# Patient Record
Sex: FEMALE | Race: WHITE | NOT HISPANIC OR LATINO | Employment: UNEMPLOYED | URBAN - METROPOLITAN AREA
[De-identification: names, ages, dates, MRNs, and addresses within clinical notes are randomized per-mention and may not be internally consistent; named-entity substitution may affect disease eponyms.]

---

## 2017-01-04 ENCOUNTER — ROUTINE PRENATAL (OUTPATIENT)
Dept: OBSTETRICS AND GYNECOLOGY | Facility: CLINIC | Age: 22
End: 2017-01-04

## 2017-01-04 VITALS — SYSTOLIC BLOOD PRESSURE: 110 MMHG | WEIGHT: 185 LBS | DIASTOLIC BLOOD PRESSURE: 68 MMHG | BODY MASS INDEX: 29.86 KG/M2

## 2017-01-04 DIAGNOSIS — Z34.02 ENCOUNTER FOR SUPERVISION OF NORMAL FIRST PREGNANCY IN SECOND TRIMESTER: ICD-10-CM

## 2017-01-04 DIAGNOSIS — O99.013 ANEMIA DURING PREGNANCY IN THIRD TRIMESTER: ICD-10-CM

## 2017-01-04 PROCEDURE — 99213 OFFICE O/P EST LOW 20 MIN: CPT | Performed by: OBSTETRICS & GYNECOLOGY

## 2017-01-04 NOTE — PROGRESS NOTES
Chief Complaint   Patient presents with   • Routine Prenatal Visit       HPI: Geraldine is a  currently at 32w6d who today reports the following:  Contractions - No; Leaking - No; Vaginal bleeding -  No; Swelling of extremities - No.    ROS:  Vitals: See prenatal flowsheet   GI: Nausea - No; Constipation - No; Diarrhea - No    Neuro: Headache - No; Visual change - No      EXAM:  Abdomen: See prenatal flowsheet   Urine glucose/protein: See prenatal flowsheet   Pelvic: See prenatal flowsheet   MDM:  Impression: 1. Supervision of pregnancy  2. Anemia in pregnancy   Tests done today: none   Topics discussed: flu vaccine  TDAP vaccination   Tests next visit: none   Next visit: See prenatal flowsheet

## 2017-01-04 NOTE — MR AVS SNAPSHOT
Geraldine Enrique   2017 11:20 AM   Routine Prenatal    Dept Phone:  [phone]   Encounter #:  [id number]    Provider:  Louie Velazquez MD   Department:  [facility]                Your Full Care Plan              Your Updated Medication List          This list is accurate as of: 17 11:49 AM.  Always use your most recent med list.                ferrous sulfate 325 (65 FE) MG tablet   Take 1 tablet by mouth 2 (Two) Times a Day Before Meals.       prenatal (CLASSIC) vitamin 28-0.8 MG tablet tablet       saccharomyces boulardii 250 MG capsule   Commonly known as:  FLORASTOR               You Were Diagnosed With        Codes Comments    Anemia during pregnancy in third trimester     ICD-10-CM: O99.013  ICD-9-CM: 648.23     Encounter for supervision of normal first pregnancy in second trimester     ICD-10-CM: Z34.02  ICD-9-CM: V22.0       Instructions     None    Patient Instructions History      Upcoming Appointments     Visit Type Date Time Department    OB FOLLOWUP 2017 11:20 AM [facility]      MyChart Signup     [facility] AudioSnaps allows you to send messages to your doctor, view your test results, renew your prescriptions, schedule appointments, and more. To sign up, go to [employer] and click on the Sign Up Now link in the New User? box. Enter your Blaze health Activation Code exactly as it appears below along with the last four digits of your Social Security Number and your Date of Birth () to complete the sign-up process. If you do not sign up before the expiration date, you must request a new code.    Blaze health Activation Code: [email or web address]  Expires: 2017 11:49 AM    If you have questions, you can email [email or web address] or call [phone] to talk to our Blaze health staff. Remember, Blaze health is NOT to be used for urgent needs. For medical emergencies, dial 911.                  Other Info from Your Visit           Allergies     No Known Allergies      Reason for Visit     Routine Prenatal Visit           Vital Signs     Blood Pressure Weight Last Menstrual Period Body Mass Index Smoking Status       110/68 185 lb (83.9 kg) 05/19/2016 29.86 kg/m2 Never Smoker       Problems and Diagnoses Noted     Anemia during pregnancy in third trimester    Prenatal care

## 2017-01-10 ENCOUNTER — HOSPITAL ENCOUNTER (OUTPATIENT)
Facility: HOSPITAL | Age: 22
Setting detail: OBSERVATION
Discharge: HOME | End: 2017-01-10
Attending: OBSTETRICS & GYNECOLOGY | Admitting: OBSTETRICS & GYNECOLOGY

## 2017-01-10 ENCOUNTER — APPOINTMENT (OUTPATIENT)
Dept: ULTRASOUND IMAGING | Facility: HOSPITAL | Age: 22
End: 2017-01-10

## 2017-01-10 VITALS
TEMPERATURE: 97.7 F | SYSTOLIC BLOOD PRESSURE: 113 MMHG | HEIGHT: 66 IN | DIASTOLIC BLOOD PRESSURE: 67 MMHG | WEIGHT: 185 LBS | BODY MASS INDEX: 29.73 KG/M2 | RESPIRATION RATE: 18 BRPM | HEART RATE: 81 BPM

## 2017-01-10 PROBLEM — O26.839 KIDNEY STONE COMPLICATING PREGNANCY: Status: ACTIVE | Noted: 2017-01-10

## 2017-01-10 PROBLEM — N20.0 KIDNEY STONE COMPLICATING PREGNANCY: Status: ACTIVE | Noted: 2017-01-10

## 2017-01-10 PROBLEM — O99.891 KIDNEY STONE COMPLICATING PREGNANCY: Status: ACTIVE | Noted: 2017-01-10

## 2017-01-10 LAB
BACTERIA UR QL AUTO: ABNORMAL /HPF
BILIRUB UR QL STRIP: NEGATIVE
CLARITY UR: ABNORMAL
COLOR UR: YELLOW
DEPRECATED RDW RBC AUTO: 40 FL (ref 37–54)
ERYTHROCYTE [DISTWIDTH] IN BLOOD BY AUTOMATED COUNT: 13.2 % (ref 11.3–14.5)
GLUCOSE UR STRIP-MCNC: NEGATIVE MG/DL
HCT VFR BLD AUTO: 32.7 % (ref 34.5–44)
HGB BLD-MCNC: 11 G/DL (ref 11.5–15.5)
HGB UR QL STRIP.AUTO: NEGATIVE
HYALINE CASTS UR QL AUTO: ABNORMAL /LPF
KETONES UR QL STRIP: NEGATIVE
LEUKOCYTE ESTERASE UR QL STRIP.AUTO: ABNORMAL
MCH RBC QN AUTO: 27.9 PG (ref 27–31)
MCHC RBC AUTO-ENTMCNC: 33.6 G/DL (ref 32–36)
MCV RBC AUTO: 83 FL (ref 80–99)
NITRITE UR QL STRIP: NEGATIVE
PH UR STRIP.AUTO: 7 [PH] (ref 5–8)
PLATELET # BLD AUTO: 209 10*3/MM3 (ref 150–450)
PMV BLD AUTO: 10.2 FL (ref 6–12)
PROT UR QL STRIP: NEGATIVE
RBC # BLD AUTO: 3.94 10*6/MM3 (ref 3.89–5.14)
RBC # UR: ABNORMAL /HPF
REF LAB TEST METHOD: ABNORMAL
SP GR UR STRIP: 1.01 (ref 1–1.03)
SQUAMOUS #/AREA URNS HPF: ABNORMAL /HPF
UROBILINOGEN UR QL STRIP: ABNORMAL
WBC NRBC COR # BLD: 10.87 10*3/MM3 (ref 3.5–10.8)
WBC UR QL AUTO: ABNORMAL /HPF

## 2017-01-10 PROCEDURE — 25010000002 MORPHINE PER 10 MG: Performed by: OBSTETRICS & GYNECOLOGY

## 2017-01-10 PROCEDURE — G0378 HOSPITAL OBSERVATION PER HR: HCPCS

## 2017-01-10 PROCEDURE — 59025 FETAL NON-STRESS TEST: CPT

## 2017-01-10 PROCEDURE — 82360 CALCULUS ASSAY QUANT: CPT | Performed by: OBSTETRICS & GYNECOLOGY

## 2017-01-10 PROCEDURE — 96361 HYDRATE IV INFUSION ADD-ON: CPT

## 2017-01-10 PROCEDURE — 76775 US EXAM ABDO BACK WALL LIM: CPT

## 2017-01-10 PROCEDURE — 99218 PR INITIAL OBSERVATION CARE/DAY 30 MINUTES: CPT | Performed by: OBSTETRICS & GYNECOLOGY

## 2017-01-10 PROCEDURE — 25010000002 DIPHENHYDRAMINE PER 50 MG: Performed by: OBSTETRICS & GYNECOLOGY

## 2017-01-10 PROCEDURE — 87086 URINE CULTURE/COLONY COUNT: CPT | Performed by: OBSTETRICS & GYNECOLOGY

## 2017-01-10 PROCEDURE — 96374 THER/PROPH/DIAG INJ IV PUSH: CPT

## 2017-01-10 PROCEDURE — 85027 COMPLETE CBC AUTOMATED: CPT | Performed by: OBSTETRICS & GYNECOLOGY

## 2017-01-10 PROCEDURE — 96375 TX/PRO/DX INJ NEW DRUG ADDON: CPT

## 2017-01-10 PROCEDURE — 99234 HOSP IP/OBS SM DT SF/LOW 45: CPT | Performed by: OBSTETRICS & GYNECOLOGY

## 2017-01-10 PROCEDURE — 81001 URINALYSIS AUTO W/SCOPE: CPT | Performed by: OBSTETRICS & GYNECOLOGY

## 2017-01-10 RX ORDER — DIPHENHYDRAMINE HYDROCHLORIDE 50 MG/ML
25 INJECTION INTRAMUSCULAR; INTRAVENOUS EVERY 6 HOURS PRN
Status: DISCONTINUED | OUTPATIENT
Start: 2017-01-10 | End: 2017-01-10 | Stop reason: SDUPTHER

## 2017-01-10 RX ORDER — SODIUM CHLORIDE 0.9 % (FLUSH) 0.9 %
1-10 SYRINGE (ML) INJECTION AS NEEDED
Status: DISCONTINUED | OUTPATIENT
Start: 2017-01-10 | End: 2017-01-10 | Stop reason: HOSPADM

## 2017-01-10 RX ORDER — DIPHENHYDRAMINE HYDROCHLORIDE 50 MG/ML
25 INJECTION INTRAMUSCULAR; INTRAVENOUS EVERY 4 HOURS PRN
Status: DISCONTINUED | OUTPATIENT
Start: 2017-01-10 | End: 2017-01-10 | Stop reason: HOSPADM

## 2017-01-10 RX ORDER — SODIUM CHLORIDE, SODIUM LACTATE, POTASSIUM CHLORIDE, CALCIUM CHLORIDE 600; 310; 30; 20 MG/100ML; MG/100ML; MG/100ML; MG/100ML
125 INJECTION, SOLUTION INTRAVENOUS CONTINUOUS
Status: DISCONTINUED | OUTPATIENT
Start: 2017-01-10 | End: 2017-01-10 | Stop reason: HOSPADM

## 2017-01-10 RX ORDER — OXYCODONE HYDROCHLORIDE AND ACETAMINOPHEN 5; 325 MG/1; MG/1
2 TABLET ORAL EVERY 4 HOURS PRN
Status: DISCONTINUED | OUTPATIENT
Start: 2017-01-10 | End: 2017-01-10 | Stop reason: HOSPADM

## 2017-01-10 RX ORDER — ONDANSETRON 2 MG/ML
4 INJECTION INTRAMUSCULAR; INTRAVENOUS EVERY 8 HOURS PRN
Status: DISCONTINUED | OUTPATIENT
Start: 2017-01-10 | End: 2017-01-10 | Stop reason: HOSPADM

## 2017-01-10 RX ORDER — LIDOCAINE HYDROCHLORIDE 10 MG/ML
5 INJECTION, SOLUTION INFILTRATION; PERINEURAL AS NEEDED
Status: DISCONTINUED | OUTPATIENT
Start: 2017-01-10 | End: 2017-01-10 | Stop reason: HOSPADM

## 2017-01-10 RX ORDER — MORPHINE SULFATE 4 MG/ML
2 INJECTION, SOLUTION INTRAMUSCULAR; INTRAVENOUS EVERY 4 HOURS PRN
Status: DISCONTINUED | OUTPATIENT
Start: 2017-01-10 | End: 2017-01-10

## 2017-01-10 RX ADMIN — SODIUM CHLORIDE, POTASSIUM CHLORIDE, SODIUM LACTATE AND CALCIUM CHLORIDE 1000 ML: 600; 310; 30; 20 INJECTION, SOLUTION INTRAVENOUS at 03:39

## 2017-01-10 RX ADMIN — SODIUM CHLORIDE, POTASSIUM CHLORIDE, SODIUM LACTATE AND CALCIUM CHLORIDE 125 ML/HR: 600; 310; 30; 20 INJECTION, SOLUTION INTRAVENOUS at 04:45

## 2017-01-10 RX ADMIN — DIPHENHYDRAMINE HYDROCHLORIDE 25 MG: 50 INJECTION INTRAMUSCULAR; INTRAVENOUS at 03:46

## 2017-01-10 RX ADMIN — MORPHINE SULFATE 2 MG: 4 INJECTION, SOLUTION INTRAMUSCULAR; INTRAVENOUS at 03:40

## 2017-01-10 RX ADMIN — OXYCODONE AND ACETAMINOPHEN 1 TABLET: 5; 325 TABLET ORAL at 04:51

## 2017-01-10 NOTE — H&P
Michel  Obstetric History and Physical    No chief complaint on file.      Subjective     Patient is a 21 y.o. female  currently at 33w5d, who presents with pressure, pelvic pain and urgency without the ability to urinate.  She denies leaking fluid and vaginal bleeding.  +FM.   Upon arrival she past a large renal stone.  She continues to report 4/10 pain in her back on the right side.  She denies N/V/F/C.  Patient received 2 mg of Morphine and had a nearly immediate flush reaction and itching.  This was treated with Benadryl and resolved.   Although she says she likely had Morphine from a previous surgery, she was not aware of any reactions in the past.   It is added as a allergey in Epic      The following portions of the patients history were reviewed and updated as appropriate: current medications, allergies, past medical history, past surgical history, past family history, past social history and problem list .       Prenatal Information:   Maternal Prenatal Labs  Blood Type No results found for: ABO   Rh Status No results found for: RH   Antibody Screen No results found for: ABSCRN   Gonnorhea No results found for: GCCX   Chlamydia No results found for: CLAMYDCU   RPR No results found for: RPR   Syphilis Antibody No results found for: SYPHILIS   Rubella No results found for: RUBELLAIGGIN   Hepatitis B Surface Antigen No results found for: HEPBSAG   HIV-1 Antibody No results found for: LABHIV1   Hepatitis C Antibody No results found for: HEPCAB   Rapid Urin Drug Screen No results found for: AMPMETHU, BARBITSCNUR, LABBENZSCN, LABMETHSCN, LABOPIASCN, THCURSCR, COCAINEUR, AMPHETSCREEN, PROPOXSCN, BUPRENORSCNU, METAMPSCNUR, OXYCODONESCN, TRICYCLICSCN   Group B Strep Culture No results found for: GBSANTIGEN           External Prenatal Results         Pregnancy Outside Results - these were transcribed from office records.  See scanned records for details. Date Time   Hgb      Hct      ABO ^ A  16    Rh  ^ Positive  16    Antibody Screen ^ Normal  (A) 16    Glucose Fasting GTT      Glucose Tolerance Test 1 hour      Glucose Tolerance Test 3 hour      Gonorrhea (discrete)      Chlamydia (discrete)      RPR ^ Non-Reactive  16    VDRL      Syphillis Antibody      Rubella ^ Immune  16    HBsAg ^ Negative  16    Herpes Simplex Virus PCR      Herpes Simplex VIrus Culture      HIV ^ Negative  16    Hep C RNA Quant PCR      Hep C Antibody      Urine Drug Screen      AFP      Group B Strep      GBS Susceptibility to Clindamycin      GBS Susceptibility to Eythromycin      Fetal Fibronectin      Genetic Testing, Maternal Blood             Legend: ^: Historical            Past OB History:     Obstetric History       T0      TAB0   SAB0   E0   M0   L0       # Outcome Date GA Lbr Britton/2nd Weight Sex Delivery Anes PTL Lv   1 Current                   Past Medical History: No past medical history on file.   Past Surgical History Past Surgical History   Procedure Laterality Date   • Cervical chiari decompression posterior     • Spinal growth rods  10/2007     scoliosis      Family History: No family history on file.   Social History:  reports that she has never smoked. She does not have any smokeless tobacco history on file.   reports that she does not drink alcohol.   has no drug history on file.        Review of Systems  Reviewed with nurse in EPIC      Objective     Vital Signs Range for the last 24 hours  Temperature: Temp:  [98.4 °F (36.9 °C)] 98.4 °F (36.9 °C)   Temp Source:     BP: BP: (127)/(72) 127/72   Pulse: Heart Rate:  [75] 75   Respirations: Resp:  [18] 18   SPO2:     O2 Amount (l/min):     O2 Devices     Weight:       Physical Examination: General appearance - Mild distress with back and pelvic pressure  Chest - clear to auscultation, no wheezes, rales or rhonchi, symmetric air entry  Heart - S1 and S2 normal, no murmurs noted  Abdomen - soft, nontender,  nondistended, no masses or organomegaly  no rebound tenderness noted  bowel sounds normal  No guarding, No RUQ pain  Back:  Right sided CVA tenderness.     Extremities - Non-tender bilaterally                          Fetal Heart Rate Assessment   Method:     Beats/min:  130s   Baseline:  130s   Varibility:     Accels:     Decels:     Tracing Category:       Uterine Assessment   Method:     Frequency (min):     Ctx Count in 10 min:     Duration:     Intensity:     Intensity by IUPC:     Resting Tone:     Resting Tone by IUPC:           Laboratory Results: UA negative for infection          Assessment:  1. Intrauterine pregnancy at 33w5d weeks gestation with reassuring fetal status  2.  Renal calculi  3.  Newly diagnosed morphine allergy      Plan:  1. FHTs - reassuring   2. UA, CBC, IV fluids  3.  Pain medications prn  4.  Renal USN          Omari Pedroza MD  1/10/2017  4:35 AM

## 2017-01-10 NOTE — NURSING NOTE
1255-Discharge instructions given to pt. & reviewed. Pt. to keep scheduled appointment on 1/18/17. Pt. V/U. Up to get dressed. Pt. Waiting for her ride to get here. Instructed to notify RN when she is leaving.  Pt. V/U.  1308-Pt.'s ride is here. Declines wheelchair for D/C. D/C'd ambulatory with friend.

## 2017-01-10 NOTE — DISCHARGE SUMMARY
Michel  Geraldine Enrique  : 1995  MRN: [id number]  CSN: [id number]    Discharge Summary    A formal discharge summary was not needed because this admission was for an observation visit    This note has been electronically signed.    Louie Velazquez MD  January 10, 2017

## 2017-01-10 NOTE — NURSING NOTE
IV morphine 2 mg given for pain per MD. Pt stated no previous allergies.  Within 30-60 seconds, pt reported itching up arm with increasing redness up to her chest. Benadryl 25mg IV given. Itching resolved.

## 2017-01-10 NOTE — IP AVS SNAPSHOT
AFTER VISIT SUMMARY             Geraldine Enrique           About your hospitalization     You were discharged on:  January 10, 2017 You last received care in the:  [facility]     Unit phone number:  [phone]       Medications    If you or your caregiver advised us that you are currently taking a medication and that medication is marked below as “Resume”, this simply indicates that we have reviewed those medications to make sure our new therapy recommendations do not interfere.  If you have concerns about medications other than those new ones which we are prescribing today, please consult the physician who prescribed them (or your primary physician).  Our review of your home medications is not meant to indicate that we are directing their use.             Your Medications      Your Medication List           Morning Noon Evening Bedtime As Needed    ferrous sulfate 325 (65 FE) MG tablet   Take 1 tablet by mouth 2 (Two) Times a Day Before Meals.                                prenatal (CLASSIC) vitamin 28-0.8 MG tablet tablet   Take  by mouth Daily.                                saccharomyces boulardii 250 MG capsule   Take 250 mg by mouth 2 (Two) Times a Day.   Commonly known as:  FLORASTOR                                         Instructions for After Discharge        Scheduled Appointments     Jan 18, 2017 10:30 AM EST   OB FOLLOWUP with Louie Velazquez MD   [facility] [place] (--)    [address] [place] [id number] [phone]            Additional instructions:      Keep scheduled appointment with Dr. Velazquez on 1/18/17.               Relevant Prenatal Information          Facts About Your Prenatal Visit (All Dating Information Is Approximate)     Due Date How Far Along Am I? Pregnancy Weight Gain Weight Gain Since Prior Visit (1/4/2017)    2/23/2017 33 weeks 5 days 29 lb (13.2 kg) 0 lb (0 kg)      Vitals        Blood Pressure Weight                113/67 185 lb (83.9 kg)           Summary of Your Hospitalization        Reason for Hospitalization     Your primary diagnosis was:  Kidney Stone Complicating Pregnancy      Care Providers     Provider Service Role Specialty    Louie Velazquez MD -- Attending Provider Obstetrics and Gynecology      Your Allergies  Date Reviewed: 1/10/2017    Allergen Reactions    Morphine And Related Hives      DNS:Net Signup     [employer] allows you to send messages to your doctor, view your test results, renew your prescriptions, schedule appointments, and more. To sign up, go to [email or web address] and click on the Sign Up Now link in the New User? box. Enter your DNS:Net Activation Code exactly as it appears below along with the last four digits of your Social Security Number and your Date of Birth () to complete the sign-up process. If you do not sign up before the expiration date, you must request a new code.    DNS:Net Activation Code: [email or web address]  Expires: 2017 11:49 AM    If you have questions, you can email [email or web address] or call [phone] to talk to our DNS:Net staff. Remember, DNS:Net is NOT to be used for urgent needs. For medical emergencies, dial 911.          Information Regarding Fetal Kick Counts     Fetal Movement Counts  Patient Name: __________________________________________________   Patient Due Date: ____________________  Performing a fetal movement count is highly recommended in high-risk pregnancies, but it is good for every pregnant woman to do. Your caregiver may ask you to start counting fetal movements at 28 weeks of the pregnancy. Fetal movements often increase:  · After eating a full meal.  · After physical activity.  · After eating or drinking something sweet or cold.  · At rest.  Pay attention to when you feel the baby is most active. This will help you notice a pattern of your baby's sleep and wake cycles  and what factors contribute to an increase in fetal movement. It is important to perform a fetal movement count at the same time each day when your baby is normally most active.    HOW TO COUNT FETAL MOVEMENTS  1. Find a quiet and comfortable area to sit or lie down on your left side. Lying on your left side provides the best blood and oxygen circulation to your baby.  2. Write down the day and time on a sheet of paper or in a journal.  3. Start counting kicks, flutters, swishes, rolls, or jabs in a 2 hour period. You should feel at least 10 movements within 2 hours.  4. If you do not feel 10 movements in 2 hours, wait 2-3 hours and count again. Look for a change in the pattern or not enough counts in 2 hours.  SEEK MEDICAL CARE IF:  · You feel less than 10 counts in 2 hours, tried twice.  · There is no movement in over an hour.  · The pattern is changing or taking longer each day to reach 10 counts in 2 hours.  · You feel the baby is not moving as he or she usually does.    Date  Movements  Start Time  Finish Time    Date  Movements  Start Time  Finish Time    Date  Movements  Start Time  Finish Time    Date  Movements  Start Time  Finish Time    Date  Movements  Start Time  Finish Time    Date  Movements  Start Time  Finish Time      Date  Movements  Start Time  Finish Time    Date  Movements  Start Time  Finish Time    Date  Movements  Start Time  Finish Time    Date  Movements  Start Time  Finish Time    Date  Movements  Start Time  Finish Time    Date  Movements  Start Time  Finish Time      Date  Movements  Start Time  Finish Time    Date  Movements  Start Time  Finish Time    Date  Movements  Start Time  Finish Time    Date  Movements  Start Time  Finish Time    Date  Movements  Start Time  Finish Time    Date  Movements  Start Time  Finish Time    Date  Movements  Start Time  Finish Time    Date  Movements  Start Time  Finish Time    Date  Movements  Start Time  Finish Time    Date  Movements  Start Time   Finish Time    Date  Movements  Start Time  Finish Time    Date  Movements  Start Time  Finish Time      Document Released: 01/17/2008   Document Revised: 12/04/2013   Document Reviewed: 10/14/2013    ExitCare® Patient Information ©2015 [employer]. This information is not intended to replace advice given to you by your health care provider. Make sure you discuss any questions you have with your health care provider.            More Information      Kidney Stones  Kidney stones (urolithiasis) are solid masses that form inside your kidneys. The intense pain is caused by the stone moving through the kidney, ureter, bladder, and urethra (urinary tract). When the stone moves, the ureter starts to spasm around the stone. The stone is usually passed in your pee (urine).   HOME CARE  · Drink enough fluids to keep your pee clear or pale yellow. This helps to get the stone out.  · Take a 24-hour pee (urine) sample as told by your doctor. You may need to take another sample every 6-12 months.  · Strain all pee through the provided strainer. Do not pee without peeing through the strainer, not even once. If you pee the stone out, catch it in the strainer. The stone may be as small as a grain of salt. Take this to your doctor. This will help your doctor figure out what you can do to try to prevent more kidney stones.  · Only take medicine as told by your doctor.  · Make changes to your daily diet as told by your doctor. You may be told to:    Limit how much salt you eat.    Eat 5 or more servings of fruits and vegetables each day.    Limit how much meat, poultry, fish, and eggs you eat.  · Keep all follow-up visits as told by your doctor. This is important.  · Get follow-up X-rays as told by your doctor.  GET HELP IF:  You have pain that gets worse even if you have been taking pain medicine.  GET HELP RIGHT AWAY IF:   · Your pain does not get better with medicine.  · You have a fever or shaking chills.  · Your pain increases  and gets worse over 18 hours.  · You have new belly (abdominal) pain.  · You feel faint or pass out.  · You are unable to pee.     This information is not intended to replace advice given to you by your health care provider. Make sure you discuss any questions you have with your health care provider.     Document Released: 06/05/2009 Document Revised: 09/07/2016 Document Reviewed: 05/21/2014  [employer] Interactive Patient Education ©2016 [employer]          Nonstress Test  The nonstress test is a procedure that monitors the fetus's heartbeat. The test will monitor the heartbeat when the fetus is at rest and while the fetus is moving. In a healthy fetus, there will be an increase in fetal heart rate when the fetus moves or kicks. The heart rate will decrease at rest. This test helps determine if the fetus is healthy. Your health care provider will look at a number of patterns in the heart rate tracing to make sure your baby is thriving. If there is concern, your health care provider may order additional tests or may suggest another course of action. This test is often done in the third trimester and can help determine if an early delivery is needed and safe. Common reasons to have this test are:  · You are past your due date.  · You have a high-risk pregnancy.  · You are feeling less movement than normal.  · You have lost a pregnancy in the past.  · Your health care provider suspects fetal growth problems.  · You have too much or too little amniotic fluid.  BEFORE THE PROCEDURE  · Eat a meal right before the test or as directed by your health care provider. Food may help stimulate fetal movements.  · Use the restroom right before the test.  PROCEDURE  · Two belts will be placed around your abdomen. These belts have monitors attached to them. One records the fetal heart rate and the other records uterine contractions.  · You may be asked to lie down on your side or to stay sitting upright.  · You may be given a  button to press when you feel movement.  · The fetal heartbeat is listened to and watched on a screen. The heartbeat is recorded on a sheet of paper.  · If the fetus seems to be sleeping, you may be asked to drink some juice or soda, gently press your abdomen, or make some noise to wake the fetus.  AFTER THE PROCEDURE   Your health care provider will discuss the test results with you and make recommendations for the near future.     This information is not intended to replace advice given to you by your health care provider. Make sure you discuss any questions you have with your health care provider.     Document Released: 12/08/2003 Document Revised: 01/08/2016 Document Reviewed: 01/21/2014  [employer] Interactive Patient Education ©2016 [employer]            SYMPTOMS OF A STROKE    Call 911 or have someone take you to the Emergency Department if you have any of the following:    · Sudden numbness or weakness of your face, arm or leg especially on one side of the body  · Sudden confusion, diffiiculty speaking or trouble understanding   · Changes in your vision or loss of sight in one eye  · Sudden severe headache with no known cause  · sudden dizziness, trouble walking, loss of balance or coordination    It is important to seek emergency care right away if you have further stroke symptoms. If you get emergency help quickly, the powerful clot-dissolving medicines can reduce the disabilities caused by a stroke.     For more information:    American Stroke Association  9-739-4-STROKE  www.strokeassociation.org           IF YOU SMOKE OR USE TOBACCO PLEASE READ THE FOLLOWING:    Why is smoking bad for me?  Smoking increases the risk of heart disease, lung disease, vascular disease, stroke, and cancer.     If you smoke, STOP!    If you would like more information on quitting smoking, please visit the [employer] website: [email or web address]   This link will provide  additional resources including the QUIT line and the Beat the Pack support groups.     For more information:    American Cancer Society  ([phone] 198-2958    American Heart Association  1-964.545.3473               YOU ARE THE MOST IMPORTANT FACTOR IN YOUR RECOVERY.     Follow all instructions carefully.     I have reviewed my discharge instructions with my nurse, including the following information, if applicable:     Information about my illness and diagnosis   Follow up appointments (including lab draws)   Wound Care   Equipment Needs   Medications (new and continuing) along with side effects   Preventative information such as vaccines and smoking cessations   Diet   Pain   I know when to contact my Doctor's office or seek emergency care      I want my nurse to describe the side effects of my medications: YES NO   If the answer is no, I understand the side effects of my medications: YES NO   My nurse described the side effects of my medications in a way that I could understand: YES NO   I have taken my personal belongings and my own medications with me at discharge: YES NO            Should a home visit be schedule with you:  a notification from your provider will be made prior to the visit.  If you have any questions or concerns about the visit, contact your provider.      I have received this information and my questions have been answered. I have discussed any concerns I see with this plan with the nurse or physician. I understand these instructions.    Signature of Patient or Responsible Person: _____________________________________    Date: _________________  Time: __________________    Signature of Healthcare Provider: _______________________________________  Date: _________________  Time: __________________

## 2017-01-10 NOTE — PROGRESS NOTES
JOSUE Pearson  Geraldine Enrique  : 1995  MRN: [id number]  CSN: [id number]    Antepartum Progress Note    Subjective   Feeling better after passing stone.     Objective     Min/max vitals past 24 hours:   Temp  Min: 98.4 °F (36.9 °C)  Max: 98.4 °F (36.9 °C)  BP  Min: 109/58  Max: 127/72  Pulse  Min: 74  Max: 75  Resp  Min: 18  Max: 18         General: well developed; well nourished  no acute distress   Heart: Not performed.   Lungs: breathing is unlabored   Abdomen: soft, non-tender; no masses  no umbilical or inginual hernias are present  no hepato-splenomegaly   FHT's: reassuring and category 1   Cervix: was not checked.   Contractions: none      Renal U/S - no residual stones.  Normal physiologic hydronephrosis of right kidney.  Left side normal.     Assessment   1. IUP at 33w5d  2. Renal stones with colic - resolved     Plan   1. Home  2. Hydration    Louie Velazquez MD  1/10/2017  10:49 AM

## 2017-01-12 LAB — BACTERIA SPEC AEROBE CULT: NORMAL

## 2017-01-17 LAB
CA PHOS CRY STONE QL IR: 96 %
COD CRY STONE QL IR: 2 %
COLOR STONE: NORMAL
COM CRY STONE QL IR: 2 %
COMPN STONE: NORMAL
Lab: NORMAL
Lab: NORMAL
NIDUS STONE QL: NORMAL
PATH REPORT.COMMENTS IMP SPEC: NORMAL
SIZE STONE: NORMAL MM
WT STONE: 25 MG

## 2017-01-18 ENCOUNTER — ROUTINE PRENATAL (OUTPATIENT)
Dept: OBSTETRICS AND GYNECOLOGY | Facility: CLINIC | Age: 22
End: 2017-01-18

## 2017-01-18 VITALS — WEIGHT: 189 LBS | SYSTOLIC BLOOD PRESSURE: 110 MMHG | BODY MASS INDEX: 30.51 KG/M2 | DIASTOLIC BLOOD PRESSURE: 70 MMHG

## 2017-01-18 DIAGNOSIS — R12 HEARTBURN DURING PREGNANCY IN THIRD TRIMESTER: ICD-10-CM

## 2017-01-18 DIAGNOSIS — O26.893 HEARTBURN DURING PREGNANCY IN THIRD TRIMESTER: ICD-10-CM

## 2017-01-18 DIAGNOSIS — Z34.02 ENCOUNTER FOR SUPERVISION OF NORMAL FIRST PREGNANCY IN SECOND TRIMESTER: Primary | ICD-10-CM

## 2017-01-18 PROCEDURE — 99213 OFFICE O/P EST LOW 20 MIN: CPT | Performed by: OBSTETRICS & GYNECOLOGY

## 2017-01-18 NOTE — MR AVS SNAPSHOT
Geraldine Enrique   2017 10:30 AM   Routine Prenatal    Dept Phone:  [phone]   Encounter #:  [id number]    Provider:  Louie Velazquez MD   Department:  [facility] [place]                Your Full Care Plan              Your Updated Medication List          This list is accurate as of: 17 11:43 AM.  Always use your most recent med list.                ferrous sulfate 325 (65 FE) MG tablet   Take 1 tablet by mouth 2 (Two) Times a Day Before Meals.       prenatal (CLASSIC) vitamin 28-0.8 MG tablet tablet       saccharomyces boulardii 250 MG capsule   Commonly known as:  FLORASTOR               You Were Diagnosed With        Codes Comments    Encounter for supervision of normal first pregnancy in second trimester    -  Primary ICD-10-CM: Z34.02  ICD-9-CM: V22.0     Heartburn during pregnancy in third trimester     ICD-10-CM: O26.893, R12  ICD-9-CM: 646.83, 787.1       Instructions     None    Patient Instructions History      Upcoming Appointments     Visit Type Date Time Department    OB FOLLOWUP 2017 10:30 AM [facility]    OB FOLLOWUP 2017  8:10 AM [facility]      MyChart Signup     [facility] [employer] allows you to send messages to your doctor, view your test results, renew your prescriptions, schedule appointments, and more. To sign up, go to [employer] and click on the Sign Up Now link in the New User? box. Enter your [employer] Activation Code exactly as it appears below along with the last four digits of your Social Security Number and your Date of Birth () to complete the sign-up process. If you do not sign up before the expiration date, you must request a new code.    [employer] Activation Code: [email or web address]  Expires: 2017 11:49 AM    If you have questions, you can email [email or web address] or call [phone] to talk to our [employer] staff. Remember, [employer] is  NOT to be used for urgent needs. For medical emergencies, dial 911.               Other Info from Your Visit           Your Appointments     Jan 25, 2017  8:10 AM EST   OB FOLLOWUP with Louie Velazquez MD   [facility] [place] (--)    [address] [place] [address] [address]  [facility] [address]   [phone]              Allergies     Morphine And Related Allergy Hives      Reason for Visit     Routine Prenatal Visit heartburn      Vital Signs     Blood Pressure Weight Last Menstrual Period Body Mass Index Smoking Status       110/70 189 lb (85.7 kg) 05/19/2016 30.51 kg/m2 Never Smoker       Problems and Diagnoses Noted     Prenatal care    Heartburn during pregnancy in third trimester

## 2017-01-18 NOTE — PROGRESS NOTES
Chief Complaint   Patient presents with   • Routine Prenatal Visit     heartburn       HPI: Geraldine is a  currently at 34w6d who today reports the following:  Contractions - No; Leaking - No; Vaginal bleeding -  No; Swelling of extremities - No.    ROS:  GI: Nausea - YES; Constipation - No; Diarrhea - No    Neuro: Headache - No; Visual change - No      EXAM:  Vitals: See prenatal flowsheet   Abdomen: See prenatal flowsheet   Urine glucose/protein: See prenatal flowsheet   Pelvic: See prenatal flowsheet   MDM:  Impression: 1. Supervision of pregnancy  2. GERD in pregnancy   Tests done today: none   Topics discussed: 1. Continue with PNV's  2. Prenatal labs reviewed  3. flu vaccine  4. TDAP vaccination   5. OTC Rx for GERD   Tests next visit: GBS testing   Next visit: See prenatal flowsheet

## 2017-01-25 ENCOUNTER — ROUTINE PRENATAL (OUTPATIENT)
Dept: OBSTETRICS AND GYNECOLOGY | Facility: CLINIC | Age: 22
End: 2017-01-25

## 2017-01-25 VITALS — WEIGHT: 192 LBS | SYSTOLIC BLOOD PRESSURE: 112 MMHG | DIASTOLIC BLOOD PRESSURE: 72 MMHG | BODY MASS INDEX: 30.99 KG/M2

## 2017-01-25 DIAGNOSIS — R12 HEARTBURN DURING PREGNANCY IN THIRD TRIMESTER: ICD-10-CM

## 2017-01-25 DIAGNOSIS — Z34.02 ENCOUNTER FOR SUPERVISION OF NORMAL FIRST PREGNANCY IN SECOND TRIMESTER: ICD-10-CM

## 2017-01-25 DIAGNOSIS — O99.013 ANEMIA DURING PREGNANCY IN THIRD TRIMESTER: ICD-10-CM

## 2017-01-25 DIAGNOSIS — O26.893 HEARTBURN DURING PREGNANCY IN THIRD TRIMESTER: ICD-10-CM

## 2017-01-25 PROCEDURE — 99213 OFFICE O/P EST LOW 20 MIN: CPT | Performed by: OBSTETRICS & GYNECOLOGY

## 2017-01-25 NOTE — MR AVS SNAPSHOT
Geraldine Enrique   2017 8:10 AM   Routine Prenatal    Dept Phone:  [phone]   Encounter #:  [id number]    Provider:  Louie Velazquez MD   Department:  [facility] [place]                Your Full Care Plan              Your Updated Medication List          This list is accurate as of: 17  8:30 AM.  Always use your most recent med list.                ferrous sulfate 325 (65 FE) MG tablet   Take 1 tablet by mouth 2 (Two) Times a Day Before Meals.       prenatal (CLASSIC) vitamin 28-0.8 MG tablet tablet       saccharomyces boulardii 250 MG capsule   Commonly known as:  FLORASTOR               You Were Diagnosed With        Codes Comments    Heartburn during pregnancy in third trimester     ICD-10-CM: O26.893, R12  ICD-9-CM: 646.83, 787.1     Anemia during pregnancy in third trimester     ICD-10-CM: O99.013  ICD-9-CM: 648.23     Encounter for supervision of normal first pregnancy in second trimester     ICD-10-CM: Z34.02  ICD-9-CM: V22.0       Instructions     None    Patient Instructions History      Upcoming Appointments     Visit Type Date Time Department    OB FOLLOWUP 2017  8:10 AM [facility]    OB FOLLOWUP 2017 10:20 AM [facility]      MyChart Signup     [facility] [employer] allows you to send messages to your doctor, view your test results, renew your prescriptions, schedule appointments, and more. To sign up, go to [employer] and click on the Sign Up Now link in the New User? box. Enter your [employer] Activation Code exactly as it appears below along with the last four digits of your Social Security Number and your Date of Birth () to complete the sign-up process. If you do not sign up before the expiration date, you must request a new code.    [employer] Activation Code: [email or web address]  Expires: 2017  8:30 AM    If you have questions, you can email  [email or web address] or call [phone] to talk to our MyChart staff. Remember, [employer] is NOT to be used for urgent needs. For medical emergencies, dial 911.               Other Info from Your Visit           Your Appointments     Feb 01, 2017 10:20 AM EST   OB FOLLOWUP with Louie Velazquez MD   [facility] [place] (--)    [address] [place] [id number] [phone]              Allergies     Morphine And Related Allergy Hives      Reason for Visit     Routine Prenatal Visit           Vital Signs     Blood Pressure Weight Last Menstrual Period Body Mass Index Smoking Status       112/72 192 lb (87.1 kg) 05/19/2016 30.99 kg/m2 Never Smoker       Problems and Diagnoses Noted     Anemia during pregnancy in third trimester    Prenatal care    Heartburn during pregnancy in third trimester

## 2017-01-25 NOTE — PROGRESS NOTES
Chief Complaint   Patient presents with   • Routine Prenatal Visit       HPI: Geraldine is a  currently at 35w6d who today reports the following:  Contractions - No; Leaking - No; Vaginal bleeding -  No; Swelling of extremities - YES.    ROS:  GI: Nausea - No; Constipation - No; Diarrhea - No    Neuro: Headache - No; Visual change - No      EXAM:  Vitals: See prenatal flowsheet   Abdomen: See prenatal flowsheet   Urine glucose/protein: See prenatal flowsheet   Pelvic: See prenatal flowsheet   MDM:  Impression: 1. Supervision of pregnancy  2. Anemia in pregnancy   Tests done today: GBS testing   Topics discussed: 1. Continue with PNV's  2. Prenatal labs reviewed  3. labor signs and symptoms   Tests next visit: none   Next visit: See prenatal flowsheet

## 2017-02-01 ENCOUNTER — ROUTINE PRENATAL (OUTPATIENT)
Dept: OBSTETRICS AND GYNECOLOGY | Facility: CLINIC | Age: 22
End: 2017-02-01

## 2017-02-01 VITALS — WEIGHT: 195 LBS | SYSTOLIC BLOOD PRESSURE: 116 MMHG | DIASTOLIC BLOOD PRESSURE: 72 MMHG | BODY MASS INDEX: 31.47 KG/M2

## 2017-02-01 DIAGNOSIS — O26.03 EXCESSIVE WEIGHT GAIN DURING PREGNANCY IN THIRD TRIMESTER: ICD-10-CM

## 2017-02-01 DIAGNOSIS — Z34.02 ENCOUNTER FOR SUPERVISION OF NORMAL FIRST PREGNANCY IN SECOND TRIMESTER: ICD-10-CM

## 2017-02-01 DIAGNOSIS — O99.013 ANEMIA DURING PREGNANCY IN THIRD TRIMESTER: ICD-10-CM

## 2017-02-01 DIAGNOSIS — O36.63X0 EXCESSIVE FETAL GROWTH AFFECTING MANAGEMENT OF MOTHER IN THIRD TRIMESTER, ANTEPARTUM, NOT APPLICABLE OR UNSPECIFIED FETUS: Primary | ICD-10-CM

## 2017-02-01 DIAGNOSIS — R12 HEARTBURN DURING PREGNANCY IN THIRD TRIMESTER: ICD-10-CM

## 2017-02-01 DIAGNOSIS — O26.893 HEARTBURN DURING PREGNANCY IN THIRD TRIMESTER: ICD-10-CM

## 2017-02-01 PROBLEM — O26.839 KIDNEY STONE COMPLICATING PREGNANCY: Status: RESOLVED | Noted: 2017-01-10 | Resolved: 2017-02-01

## 2017-02-01 PROBLEM — O99.891 KIDNEY STONE COMPLICATING PREGNANCY: Status: RESOLVED | Noted: 2017-01-10 | Resolved: 2017-02-01

## 2017-02-01 PROBLEM — N20.0 KIDNEY STONE COMPLICATING PREGNANCY: Status: RESOLVED | Noted: 2017-01-10 | Resolved: 2017-02-01

## 2017-02-01 PROCEDURE — 99213 OFFICE O/P EST LOW 20 MIN: CPT | Performed by: OBSTETRICS & GYNECOLOGY

## 2017-02-01 NOTE — PROGRESS NOTES
Chief Complaint   Patient presents with   • Routine Prenatal Visit       HPI: Geraldine is a  currently at 36w6d who today reports the following:  Contractions - YES - but less than 4/hour AND no associated change in vaginal discharge; Leaking - No; Vaginal bleeding -  No; Swelling of extremities - No.    ROS:  GI: Nausea - No; Constipation - No; Diarrhea - No    Neuro: Headache - No; Visual change - No      EXAM:  Vitals: See prenatal flowsheet   Abdomen: See prenatal flowsheet   Urine glucose/protein: See prenatal flowsheet   Pelvic: See prenatal flowsheet   MDM:  Impression: 1. Supervision of pregnancy  2. Anemia in pregnancy  3. Excess weight gain in pregnancy  4. Infant LGA   Tests done today: 1. none   Topics discussed: 1. Continue with PNV's  2. Prenatal labs reviewed  3. weight gain   Tests next visit: U/S for EFW   Next visit: See prenatal flowsheet

## 2017-02-08 ENCOUNTER — ROUTINE PRENATAL (OUTPATIENT)
Dept: OBSTETRICS AND GYNECOLOGY | Facility: CLINIC | Age: 22
End: 2017-02-08

## 2017-02-08 VITALS — SYSTOLIC BLOOD PRESSURE: 116 MMHG | BODY MASS INDEX: 32.12 KG/M2 | DIASTOLIC BLOOD PRESSURE: 70 MMHG | WEIGHT: 199 LBS

## 2017-02-08 DIAGNOSIS — Z34.02 ENCOUNTER FOR SUPERVISION OF NORMAL FIRST PREGNANCY IN SECOND TRIMESTER: ICD-10-CM

## 2017-02-08 DIAGNOSIS — O26.03 EXCESSIVE WEIGHT GAIN DURING PREGNANCY IN THIRD TRIMESTER: ICD-10-CM

## 2017-02-08 DIAGNOSIS — O99.013 ANEMIA DURING PREGNANCY IN THIRD TRIMESTER: ICD-10-CM

## 2017-02-08 DIAGNOSIS — O36.63X0 EXCESSIVE FETAL GROWTH AFFECTING MANAGEMENT OF MOTHER IN THIRD TRIMESTER, ANTEPARTUM, NOT APPLICABLE OR UNSPECIFIED FETUS: Primary | ICD-10-CM

## 2017-02-08 PROCEDURE — 99213 OFFICE O/P EST LOW 20 MIN: CPT | Performed by: OBSTETRICS & GYNECOLOGY

## 2017-02-08 NOTE — PROGRESS NOTES
Chief Complaint   Patient presents with   • Routine Prenatal Visit       HPI: Geraldine is a  currently at 37w6d who today reports the following:  Contractions - No; Leaking - No; Vaginal bleeding -  No; Swelling of extremities - No.    ROS:  GI: Nausea - No; Constipation - No; Diarrhea - No    Neuro: Headache - No; Visual change - No      EXAM:  Vitals: See prenatal flowsheet   Abdomen: See prenatal flowsheet   Urine glucose/protein: See prenatal flowsheet   Pelvic: See prenatal flowsheet   MDM:  Impression: 1. Supervision of pregnancy  2. LGA - non diabetic  3. Anemia in pregnancy   Tests done today: 1. U/S for EFW   Topics discussed: 1. Continue with PNV's  2. Prenatal labs reviewed  3. none - she had no major complaints,questions or concerns   4. I explained that with the infant being large, especially with large abdominal circumference, there may be a slightly higher risk of shoulder dystocia.  So long as she is a nondiabetic, the estimated fetal weight is not yet at the point I recommend primary  delivery.  I would caution against the use of vacuum or forceps should be a protracted labor or long pushing efforts.     Tests next visit: none   Next visit: See prenatal flowsheet

## 2017-02-14 ENCOUNTER — HOSPITAL ENCOUNTER (OUTPATIENT)
Facility: HOSPITAL | Age: 22
Setting detail: OBSERVATION
Discharge: HOME | End: 2017-02-14
Attending: OBSTETRICS & GYNECOLOGY | Admitting: OBSTETRICS & GYNECOLOGY

## 2017-02-14 ENCOUNTER — TELEPHONE (OUTPATIENT)
Dept: OBSTETRICS AND GYNECOLOGY | Facility: CLINIC | Age: 22
End: 2017-02-14

## 2017-02-14 VITALS
TEMPERATURE: 98.2 F | DIASTOLIC BLOOD PRESSURE: 83 MMHG | RESPIRATION RATE: 18 BRPM | SYSTOLIC BLOOD PRESSURE: 122 MMHG | HEART RATE: 87 BPM

## 2017-02-14 PROBLEM — O36.8190 DECREASED FETAL MOVEMENT: Status: ACTIVE | Noted: 2017-02-14

## 2017-02-14 PROBLEM — O36.8190 DECREASED FETAL MOVEMENT: Status: RESOLVED | Noted: 2017-02-14 | Resolved: 2017-02-14

## 2017-02-14 LAB — POC ALBUMIN: NORMAL

## 2017-02-14 PROCEDURE — 99234 HOSP IP/OBS SM DT SF/LOW 45: CPT | Performed by: OBSTETRICS & GYNECOLOGY

## 2017-02-14 PROCEDURE — G0378 HOSPITAL OBSERVATION PER HR: HCPCS

## 2017-02-14 PROCEDURE — 59025 FETAL NON-STRESS TEST: CPT

## 2017-02-14 NOTE — DISCHARGE SUMMARY
Admission date: 2/14/2017  Discharge date: 02/14/17    Admission diagnosis:  Decreased fetal movement [O36.8190]    Discharge diagnosis:  Reactive NST    Consultants:      Hospital course:  Baby was moving well on monitor and NST was very reactive      Vitals:    02/14/17 1656   BP: 122/83   Pulse: 87   Resp:    Temp:      GENERAL:  Well-developed, well-nourished in no acute distress.   SKIN:  Warm, dry without rashes, purpura or petechiae.  NECK:  Supple with good range of motion; no thyromegaly or masses, no JVD.  LYMPHATICS:  No cervical, supraclavicular, axillary or inguinal adenopathy.  CHEST:  Lungs clear to percussion and auscultation. Good airflow.  CARDIAC:  Regular rate and rhythm without murmurs, rubs or gallops.   EXTREMITIES:  No clubbing, cyanosis or edema.  PSYCHIATRIC:  Normal affect and mood.      Discharge condition: stable  Discharge diet        Dietary Orders            Start     Ordered    02/14/17 1713  Diet Regular  Diet Effective Now     Question:  Diet Texture / Consistency  Answer:  Regular    02/14/17 1712        Additional Instructions: Call with fevers, uncontrolled nausea/vomiting/pain.  Medications:    Geraldine Enrique   Home Medication Instructions GABY:[id number]    Printed on:02/14/17 5676   Medication Information                      ferrous sulfate 325 (65 FE) MG tablet  Take 1 tablet by mouth 2 (Two) Times a Day Before Meals.             Prenatal Vit-Fe Fumarate-FA (PRENATAL, CLASSIC, VITAMIN) 28-0.8 MG tablet tablet  Take  by mouth Daily.             saccharomyces boulardii (FLORASTOR) 250 MG capsule  Take 250 mg by mouth 2 (Two) Times a Day.               Disposition:Home or Self Care  Follow up: Future Appointments  Date Time Provider Department Center   2/15/2017 9:00 AM [facility]       Less than  30 minutes on discharge.  Counseling and coordinating care.    Cristobal Lawrence MD

## 2017-02-14 NOTE — H&P
[facility]  Obstetric History and Physical    Chief Complaint   Patient presents with   • Decreased Fetal Movement     Pt felt baby move @ 1620 in room        Subjective     Patient is a 21 y.o. female  currently at 38w5d, who presents with decreased fetal movement.    Her prenatal care is benign.  Her previous obstetric/gynecological history is noted for is non-contributory.    The following portions of the patients history were reviewed and updated as appropriate: allergies and past medical history .       Prenatal Information:  Prenatal Results         1st Trimester Ref. Range Date Time   CBC with auto diff ^ HgB = 12.6   16    Rubella IgG ^ Immune   16    Hepatitis B SAg ^ Negative   16    RPR ^ Non-Reactive   16    ABO ^ A   16    Rh ^ Positive   16    Anibody Screen ^ Normal  (A) Abnormal 16    HIV ^ Negative   16    Varicella IgG       Urinalysis with microscopy       Urine Culture       GC/Chlamydia/TV ^ neg / neg / neg   09/15/16    ThinPrep/Pap       2nd and 3rd Trimester Ref. Range Date Time   Hemoglobin / Hematocrit  32.7 % (L) 34.5 - 44.0 % 01/10/17 0339   Hemoglobin  11.0 g/dL (L) 11.5 - 15.5 g/dL 01/10/17 0339   Group B Strep Culture ^ Negative   17    Glucose Challege Test 1 hour  115 mg/dL 65 - 199 mg/dL 16 1000   Glucose Tolerance Test 3 hours       Pre-eclampsia Panel       Risk Screening Ref. Range Date Time   Fetal Fibronectin       Amnisure       Hepatitis C Antibody       Hemoglobin electrophoresis       Cystic Fibrosis       Hemoglobin A1C       MSAFP - 4       NIPT ^ Low risk 46 XY   16    AFP       Parvovirus IgG       Parvovirus IgM       POCT - glucose       Uriah-Sac       24 Hour urine - Total protein       24 Hour urine - Creatinine clearance       Urinalysis with microscopy       Urine Culture       Drug Screening Ref. Range Date Time   Amphetamine Screen       Barbiturate Screen       Benzodiazepine Screen        Methadone Screen       Phencyclidine Screen       Opiates Screen       THC Screen       Cocaine Screen       Amphetamine Screen       Propoxyphene Screen       Buprenorphine Screen       Methamphetamine Screen       Oxycodone Screen       Tryicyclic Antidepressants Screen              Legend: ^: Historical            View all results for this pregnancy        External Prenatal Results         Pregnancy Outside Results - these were transcribed from office records.  See scanned records for details. Date Time   Hgb      Hct      ABO ^ A  16    Rh ^ Positive  16    Antibody Screen ^ Normal  (A) 16    Glucose Fasting GTT      Glucose Tolerance Test 1 hour      Glucose Tolerance Test 3 hour      Gonorrhea (discrete)      Chlamydia (discrete)      RPR ^ Non-Reactive  16    VDRL      Syphillis Antibody      Rubella ^ Immune  16    HBsAg ^ Negative  16    Herpes Simplex Virus PCR      Herpes Simplex VIrus Culture      HIV ^ Negative  16    Hep C RNA Quant PCR      Hep C Antibody      Urine Drug Screen      AFP      Group B Strep ^ Negative  17    GBS Susceptibility to Clindamycin      GBS Susceptibility to Eythromycin      Fetal Fibronectin      Genetic Testing, Maternal Blood             Legend: ^: Historical           Past OB History:     Obstetric History       T0      TAB0   SAB0   E0   M0   L0       # Outcome Date GA Lbr Britton/2nd Weight Sex Delivery Anes PTL Lv   1 Current                   Past Medical History: Past Medical History   Diagnosis Date   • Migraine      resolved at age 13-14   • Scoliosis      surgically corrected at age 13-13 yo   • Stone, kidney       Past Surgical History Past Surgical History   Procedure Laterality Date   • Cervical chiari decompression posterior     • Spinal growth rods  10/2007     scoliosis   • Adenoidectomy     • Tonsillectomy        Family History: Family History   Problem Relation Age of Onset   • Hypertension  Mother    • Diabetes Sister    • Hypertension Maternal Grandmother    • Cancer Maternal Grandmother       Social History:  reports that she has never smoked. She does not have any smokeless tobacco history on file.   reports that she does not drink alcohol.   reports that she does not use illicit drugs.        General ROS: Pertinent items are noted in HPI, all other systems reviewed and negative    Objective       Vital Signs Range for the last 24 hours  Temperature: Temp:  [98.2 °F (36.8 °C)] 98.2 °F (36.8 °C)   Temp Source: Temp src: Oral   BP: BP: (126)/(73) 126/73   Pulse: Heart Rate:  [82] 82   Respirations: Resp:  [18] 18   SPO2:     O2 Amount (l/min):     O2 Devices     Weight:       Physical Examination: General appearance - alert, well appearing, and in no distress  Mental status - alert, oriented to person, place, and time  Neck - supple, no significant adenopathy  Chest - clear to auscultation, no wheezes, rales or rhonchi, symmetric air entry  Heart - normal rate, regular rhythm, normal S1, S2, no murmurs, rubs, clicks or gallops  Abdomen - Gravid X-3  Extremities - peripheral pulses normal, no pedal edema, no clubbing or cyanosis    Presentation: Vertex   Cervix: Exam by: Method: sterile exam per physician   Dilation: Dilation: 0   Effacement: Cervical Effacement: 50%   Station: Station: -2       Fetal Heart Rate Assessment   Method:     Beats/min:     Baseline:     Varibility:     Accels:     Decels:     Tracing Category:       Uterine Assessment   Method: Method: palpation   Frequency (min):     Ctx Count in 10 min:     Duration:     Intensity: Contraction Intensity: mild by palpation (not felt by patient)   Intensity by IUPC:     Resting Tone: Uterine Resting Tone: soft by palpation   Resting Tone by IUPC:     [place] Units:       Laboratory Results:   Radiology Review:   Other Studies:     Assessment/Plan     Active Problems:    Decreased fetal movement        Assessment:  1.  Intrauterine  pregnancy at 38w5d weeks gestation with reactive fetal status.    2.  Decreased fetal movement  3.  Obstetrical history significant for is non-contributory.  4.  GBS status: No results found for: GBSANTIGEN    Plan:  1. NST  2. Plan of care has been reviewed with patient and patient agrees  3.  Risks, benefits of treatment plan have been discussed.  4.  All questions have been answered.  5.  Home after NST      Cristobal Lawrence MD  2/14/2017  5:18 PM

## 2017-02-14 NOTE — TELEPHONE ENCOUNTER
----- Message from Nolvia Rao sent at 2/14/2017  2:12 PM EST -----  Contact: [phone]  Pt called will be 39 weeks tomorrow and woke up this morning swollen more so than before -       Feet,legs no cramping no bleeding just swollen

## 2017-02-15 ENCOUNTER — ROUTINE PRENATAL (OUTPATIENT)
Dept: OBSTETRICS AND GYNECOLOGY | Facility: CLINIC | Age: 22
End: 2017-02-15

## 2017-02-15 VITALS — WEIGHT: 200 LBS | DIASTOLIC BLOOD PRESSURE: 76 MMHG | SYSTOLIC BLOOD PRESSURE: 120 MMHG | BODY MASS INDEX: 32.28 KG/M2

## 2017-02-15 DIAGNOSIS — O99.013 ANEMIA DURING PREGNANCY IN THIRD TRIMESTER: ICD-10-CM

## 2017-02-15 DIAGNOSIS — O36.63X0 EXCESSIVE FETAL GROWTH AFFECTING MANAGEMENT OF MOTHER IN THIRD TRIMESTER, ANTEPARTUM, NOT APPLICABLE OR UNSPECIFIED FETUS: Primary | ICD-10-CM

## 2017-02-15 DIAGNOSIS — O26.03 EXCESSIVE WEIGHT GAIN DURING PREGNANCY IN THIRD TRIMESTER: ICD-10-CM

## 2017-02-15 DIAGNOSIS — Z34.02 ENCOUNTER FOR SUPERVISION OF NORMAL FIRST PREGNANCY IN SECOND TRIMESTER: ICD-10-CM

## 2017-02-15 PROCEDURE — 99213 OFFICE O/P EST LOW 20 MIN: CPT | Performed by: OBSTETRICS & GYNECOLOGY

## 2017-02-15 RX ORDER — OMEPRAZOLE 20 MG/1
20 CAPSULE, DELAYED RELEASE ORAL DAILY
COMMUNITY
End: 2017-04-10

## 2017-02-15 NOTE — PROGRESS NOTES
Chief Complaint   Patient presents with   • Routine Prenatal Visit       HPI: Geraldine is a  currently at 38w6d who today reports the following:  Contractions - No; Leaking - No; Vaginal bleeding -  No; Swelling of extremities - No.    ROS:  GI: Nausea - No; Constipation - No; Diarrhea - No    Neuro: Headache - No; Visual change - No      EXAM:  Vitals: See prenatal flowsheet   Abdomen: See prenatal flowsheet   Urine glucose/protein: See prenatal flowsheet   Pelvic: See prenatal flowsheet   MDM:  Impression: 1. Supervision of pregnancy  2. LGA infant  3. Anemia in pregnancy   Tests done today: 1. none   Topics discussed: 1. Continue with PNV's  2. Prenatal labs reviewed  3. I spoke with Geraldine about management of postdates pregnancy with an unfavorable cervix.  If the cervix is unfavorable, the  rate with IOL may be > 2x the baseline  rate.  I explained to Geraldine that as any pregnancy extends beyond the due date, the risks of fetal demise gradually rise.  Beyond 41 1/2 weeks gestation, the rates of IUFD increases further.  The risks of extending pregnancy beyond the due date have to be balanced against the risk of increasing the rates of  with induction of labor (IOL).   She will be seen twice weekly after her EDC.  At each visit, an assessment of fetal health and well being will be performed (i.e. NST, BPP, JANELLE).  So long as the assessment of fetal health is reassuring and there is no maternal indication for an IOL, it was my recommendation that unless there is evidence of excess maternal or fetal risk, we defer IOL until 41 1/2 weeks.  I told Geraldine that beyond 39 weeks, an elective IOL is always an option.  However, I cannot alter the increased rate of  if her cervix is unfavorable.   Tests next visit: none   Next visit: See prenatal flowsheet

## 2017-02-19 ENCOUNTER — HOSPITAL ENCOUNTER (INPATIENT)
Facility: HOSPITAL | Age: 22
LOS: 2 days | Discharge: HOME | End: 2017-02-21
Attending: OBSTETRICS & GYNECOLOGY | Admitting: OBSTETRICS & GYNECOLOGY

## 2017-02-19 DIAGNOSIS — Z37.9 NORMAL LABOR: ICD-10-CM

## 2017-02-19 PROBLEM — O26.03 EXCESSIVE WEIGHT GAIN DURING PREGNANCY IN THIRD TRIMESTER: Status: RESOLVED | Noted: 2017-02-01 | Resolved: 2017-02-19

## 2017-02-19 PROBLEM — O36.63X0 EXCESSIVE FETAL GROWTH AFFECTING MANAGEMENT OF MOTHER IN THIRD TRIMESTER, ANTEPARTUM: Status: RESOLVED | Noted: 2017-02-01 | Resolved: 2017-02-19

## 2017-02-19 LAB
ABO GROUP BLD: NORMAL
AMPHET+METHAMPHET UR QL: NEGATIVE
AMPHETAMINES UR QL: NEGATIVE
BARBITURATES UR QL SCN: NEGATIVE
BENZODIAZ UR QL SCN: NEGATIVE
BLD GP AB SCN SERPL QL: NEGATIVE
BUPRENORPHINE SERPL-MCNC: NEGATIVE NG/ML
CANNABINOIDS SERPL QL: NEGATIVE
COCAINE UR QL: NEGATIVE
DEPRECATED RDW RBC AUTO: 39.7 FL (ref 37–54)
ERYTHROCYTE [DISTWIDTH] IN BLOOD BY AUTOMATED COUNT: 13.9 % (ref 11.3–14.5)
HCT VFR BLD AUTO: 32.9 % (ref 34.5–44)
HGB BLD-MCNC: 10.5 G/DL (ref 11.5–15.5)
MCH RBC QN AUTO: 24.9 PG (ref 27–31)
MCHC RBC AUTO-ENTMCNC: 31.9 G/DL (ref 32–36)
MCV RBC AUTO: 78.1 FL (ref 80–99)
METHADONE UR QL SCN: NEGATIVE
OPIATES UR QL: NEGATIVE
OXYCODONE UR QL SCN: NEGATIVE
PCP UR QL SCN: NEGATIVE
PLATELET # BLD AUTO: 216 10*3/MM3 (ref 150–450)
PMV BLD AUTO: 10.9 FL (ref 6–12)
PROPOXYPH UR QL: NEGATIVE
RBC # BLD AUTO: 4.21 10*6/MM3 (ref 3.89–5.14)
RH BLD: POSITIVE
TRICYCLICS UR QL SCN: NEGATIVE
WBC NRBC COR # BLD: 9.18 10*3/MM3 (ref 3.5–10.8)

## 2017-02-19 PROCEDURE — 80306 DRUG TEST PRSMV INSTRMNT: CPT | Performed by: OBSTETRICS & GYNECOLOGY

## 2017-02-19 PROCEDURE — 25010000002 BUTORPHANOL PER 1 MG: Performed by: OBSTETRICS & GYNECOLOGY

## 2017-02-19 PROCEDURE — 86901 BLOOD TYPING SEROLOGIC RH(D): CPT

## 2017-02-19 PROCEDURE — 86850 RBC ANTIBODY SCREEN: CPT

## 2017-02-19 PROCEDURE — 59025 FETAL NON-STRESS TEST: CPT

## 2017-02-19 PROCEDURE — 85027 COMPLETE CBC AUTOMATED: CPT | Performed by: OBSTETRICS & GYNECOLOGY

## 2017-02-19 PROCEDURE — 59409 OBSTETRICAL CARE: CPT | Performed by: OBSTETRICS & GYNECOLOGY

## 2017-02-19 PROCEDURE — 86900 BLOOD TYPING SEROLOGIC ABO: CPT

## 2017-02-19 RX ORDER — DOCUSATE SODIUM 100 MG/1
100 CAPSULE, LIQUID FILLED ORAL 2 TIMES DAILY PRN
Status: DISCONTINUED | OUTPATIENT
Start: 2017-02-19 | End: 2017-02-21 | Stop reason: HOSPADM

## 2017-02-19 RX ORDER — PROMETHAZINE HYDROCHLORIDE 25 MG/ML
12.5 INJECTION, SOLUTION INTRAMUSCULAR; INTRAVENOUS EVERY 6 HOURS PRN
Status: DISCONTINUED | OUTPATIENT
Start: 2017-02-19 | End: 2017-02-19

## 2017-02-19 RX ORDER — IBUPROFEN 600 MG/1
600 TABLET ORAL EVERY 6 HOURS PRN
Status: DISCONTINUED | OUTPATIENT
Start: 2017-02-19 | End: 2017-02-21 | Stop reason: HOSPADM

## 2017-02-19 RX ORDER — LANOLIN 100 %
OINTMENT (GRAM) TOPICAL
Status: DISCONTINUED | OUTPATIENT
Start: 2017-02-19 | End: 2017-02-21 | Stop reason: HOSPADM

## 2017-02-19 RX ORDER — METHYLERGONOVINE MALEATE 0.2 MG/ML
200 INJECTION INTRAVENOUS ONCE AS NEEDED
Status: DISCONTINUED | OUTPATIENT
Start: 2017-02-19 | End: 2017-02-19 | Stop reason: HOSPADM

## 2017-02-19 RX ORDER — MISOPROSTOL 200 UG/1
800 TABLET ORAL AS NEEDED
Status: DISCONTINUED | OUTPATIENT
Start: 2017-02-19 | End: 2017-02-19 | Stop reason: HOSPADM

## 2017-02-19 RX ORDER — SODIUM CHLORIDE 0.9 % (FLUSH) 0.9 %
1-10 SYRINGE (ML) INJECTION AS NEEDED
Status: DISCONTINUED | OUTPATIENT
Start: 2017-02-19 | End: 2017-02-19

## 2017-02-19 RX ORDER — BISACODYL 10 MG
10 SUPPOSITORY, RECTAL RECTAL DAILY PRN
Status: DISCONTINUED | OUTPATIENT
Start: 2017-02-20 | End: 2017-02-21 | Stop reason: HOSPADM

## 2017-02-19 RX ORDER — PROMETHAZINE HYDROCHLORIDE 12.5 MG/1
12.5 TABLET ORAL EVERY 6 HOURS PRN
Status: DISCONTINUED | OUTPATIENT
Start: 2017-02-19 | End: 2017-02-19

## 2017-02-19 RX ORDER — POLYETHYLENE GLYCOL 3350 17 G/17G
17 POWDER, FOR SOLUTION ORAL DAILY PRN
Status: DISCONTINUED | OUTPATIENT
Start: 2017-02-19 | End: 2017-02-21 | Stop reason: HOSPADM

## 2017-02-19 RX ORDER — SODIUM CHLORIDE, SODIUM LACTATE, POTASSIUM CHLORIDE, CALCIUM CHLORIDE 600; 310; 30; 20 MG/100ML; MG/100ML; MG/100ML; MG/100ML
125 INJECTION, SOLUTION INTRAVENOUS CONTINUOUS
Status: DISCONTINUED | OUTPATIENT
Start: 2017-02-19 | End: 2017-02-19

## 2017-02-19 RX ORDER — OXYCODONE HYDROCHLORIDE AND ACETAMINOPHEN 5; 325 MG/1; MG/1
2 TABLET ORAL EVERY 4 HOURS PRN
Status: DISCONTINUED | OUTPATIENT
Start: 2017-02-19 | End: 2017-02-21 | Stop reason: HOSPADM

## 2017-02-19 RX ORDER — CARBOPROST TROMETHAMINE 250 UG/ML
250 INJECTION, SOLUTION INTRAMUSCULAR AS NEEDED
Status: DISCONTINUED | OUTPATIENT
Start: 2017-02-19 | End: 2017-02-19 | Stop reason: HOSPADM

## 2017-02-19 RX ORDER — ACETAMINOPHEN 325 MG/1
650 TABLET ORAL EVERY 4 HOURS PRN
Status: DISCONTINUED | OUTPATIENT
Start: 2017-02-19 | End: 2017-02-19

## 2017-02-19 RX ORDER — OXYTOCIN/RINGER'S LACTATE 20/1000 ML
999 PLASTIC BAG, INJECTION (ML) INTRAVENOUS ONCE
Status: DISCONTINUED | OUTPATIENT
Start: 2017-02-19 | End: 2017-02-19 | Stop reason: HOSPADM

## 2017-02-19 RX ORDER — PROMETHAZINE HYDROCHLORIDE 12.5 MG/1
12.5 SUPPOSITORY RECTAL EVERY 6 HOURS PRN
Status: DISCONTINUED | OUTPATIENT
Start: 2017-02-19 | End: 2017-02-19

## 2017-02-19 RX ORDER — ZOLPIDEM TARTRATE 5 MG/1
5 TABLET ORAL NIGHTLY PRN
Status: DISCONTINUED | OUTPATIENT
Start: 2017-02-19 | End: 2017-02-21 | Stop reason: HOSPADM

## 2017-02-19 RX ORDER — OXYTOCIN/RINGER'S LACTATE 20/1000 ML
125 PLASTIC BAG, INJECTION (ML) INTRAVENOUS AS NEEDED
Status: DISCONTINUED | OUTPATIENT
Start: 2017-02-19 | End: 2017-02-19 | Stop reason: HOSPADM

## 2017-02-19 RX ORDER — OXYCODONE HYDROCHLORIDE AND ACETAMINOPHEN 5; 325 MG/1; MG/1
1 TABLET ORAL EVERY 4 HOURS PRN
Status: DISCONTINUED | OUTPATIENT
Start: 2017-02-19 | End: 2017-02-21 | Stop reason: HOSPADM

## 2017-02-19 RX ORDER — OXYTOCIN/RINGER'S LACTATE 30/500 ML
2-24 PLASTIC BAG, INJECTION (ML) INTRAVENOUS
Status: DISCONTINUED | OUTPATIENT
Start: 2017-02-19 | End: 2017-02-19

## 2017-02-19 RX ADMIN — BUTORPHANOL TARTRATE 1 MG: 2 INJECTION, SOLUTION INTRAMUSCULAR; INTRAVENOUS at 14:24

## 2017-02-19 RX ADMIN — BUTORPHANOL TARTRATE 1 MG: 2 INJECTION, SOLUTION INTRAMUSCULAR; INTRAVENOUS at 16:45

## 2017-02-19 RX ADMIN — BUTORPHANOL TARTRATE 2 MG: 2 INJECTION, SOLUTION INTRAMUSCULAR; INTRAVENOUS at 18:39

## 2017-02-19 RX ADMIN — BUTORPHANOL TARTRATE 1 MG: 2 INJECTION, SOLUTION INTRAMUSCULAR; INTRAVENOUS at 12:10

## 2017-02-19 RX ADMIN — SODIUM CHLORIDE, POTASSIUM CHLORIDE, SODIUM LACTATE AND CALCIUM CHLORIDE 1000 ML: 600; 310; 30; 20 INJECTION, SOLUTION INTRAVENOUS at 03:55

## 2017-02-19 RX ADMIN — Medication 2 MILLI-UNITS/MIN: at 12:30

## 2017-02-19 RX ADMIN — Medication 999 ML/HR: at 21:37

## 2017-02-19 RX ADMIN — IBUPROFEN 600 MG: 600 TABLET ORAL at 23:35

## 2017-02-19 RX ADMIN — OXYCODONE AND ACETAMINOPHEN 1 TABLET: 5; 325 TABLET ORAL at 23:35

## 2017-02-19 NOTE — H&P
" Michel  Geraldine Enrique  : 1995  MRN: [id number]  CSN: [id number]    History and Physical    Subjective   Geraldine Enrique is a 21 y.o. year old  with an Estimated Date of Delivery: 17 currently at 39w3d presenting with irregular contractions, leaking since 02:00 and normal fetal movement.  Contraction were q 8-10 initially and have not increased since then.  Fluid was clear with slight blood tinge.    Prenatal care has been with Dr. Velazquez.  It has been complicated by large for gestational age (EFW = 3921 gm @ 38 weeks), fetal soft marker for aneuploidy (LVEIF) with normal NIPT and kidney stones.    Obstetric History       T0      TAB0   SAB0   E0   M0   L0       # Outcome Date GA Lbr Britton/2nd Weight Sex Delivery Anes PTL Lv   1 Current                 Past Medical History   Diagnosis Date   • Migraine      resolved at age 13-14   • Scoliosis      surgically corrected at age 13-15 yo   • Stone, kidney      Past Surgical History   Procedure Laterality Date   • Cervical chiari decompression posterior     • Spinal growth rods  10/2007     scoliosis   • Adenoidectomy     • Tonsillectomy         Allergies   Allergen Reactions   • Morphine And Related Hives     Smoking status: Never Smoker                                                                 Smokeless status: Not on file                       Review of Systems      Objective   Visit Vitals   • /74 (BP Location: Left arm, Patient Position: Sitting)   • Pulse 65   • Temp 97.8 °F (36.6 °C) (Oral)   • Resp 18   • Ht 66\" (167.6 cm)   • Wt 200 lb (90.7 kg)   • LMP 2016   • Breastfeeding Yes   • BMI 32.28 kg/m2     General: well developed; well nourished  no acute distress   Heart: Not performed.   Lungs: breathing is unlabored   Abdomen: soft, non-tender; no masses  no umbilical or inginual hernias are present  no hepato-splenomegaly   FHT's: reassuring and category 1   Cervix: was not checked. "   Presentation: cephalic by assessment on admission   Contractions: irregular - external monitors used     Prenatal Labs  Lab Results   Component Value Date    HGB 10.5 (L) 02/19/2017    HEPBSAG Negative 06/30/2016    ABSCRN Negative 02/19/2017    VMVHTTN6RP 115 11/30/2016    GBSANTIGEN Negative 01/25/2017    URINECX >100,000 CFU/mL Normal Urogenital Mendy 01/10/2017       Current Labs Reviewed   No data reviewed       Assessment   1. IUP at 39w3d  2. Group B strep status: negative  3. PROM at term  4. LGA fetus w/o maternal GDM     Plan   1. Expectant management  2. If not further in labor by ~ 10 hours after ROM, will augment.   3. Minimize exams    Louie Velazquez MD  2/19/2017  10:27 AM

## 2017-02-20 LAB
HCT VFR BLD AUTO: 29 % (ref 34.5–44)
HGB BLD-MCNC: 9.2 G/DL (ref 11.5–15.5)

## 2017-02-20 PROCEDURE — 85018 HEMOGLOBIN: CPT | Performed by: OBSTETRICS & GYNECOLOGY

## 2017-02-20 PROCEDURE — 25010000002 TDAP 5-2.5-18.5 LF-MCG/0.5 SUSPENSION: Performed by: OBSTETRICS & GYNECOLOGY

## 2017-02-20 PROCEDURE — 85014 HEMATOCRIT: CPT | Performed by: OBSTETRICS & GYNECOLOGY

## 2017-02-20 PROCEDURE — 99231 SBSQ HOSP IP/OBS SF/LOW 25: CPT | Performed by: OBSTETRICS & GYNECOLOGY

## 2017-02-20 PROCEDURE — 90471 IMMUNIZATION ADMIN: CPT | Performed by: OBSTETRICS & GYNECOLOGY

## 2017-02-20 PROCEDURE — 90715 TDAP VACCINE 7 YRS/> IM: CPT | Performed by: OBSTETRICS & GYNECOLOGY

## 2017-02-20 RX ADMIN — OXYCODONE AND ACETAMINOPHEN 1 TABLET: 5; 325 TABLET ORAL at 22:10

## 2017-02-20 RX ADMIN — DOCUSATE SODIUM 100 MG: 100 CAPSULE, LIQUID FILLED ORAL at 16:54

## 2017-02-20 RX ADMIN — OXYCODONE AND ACETAMINOPHEN 2 TABLET: 5; 325 TABLET ORAL at 04:51

## 2017-02-20 RX ADMIN — OXYCODONE AND ACETAMINOPHEN 1 TABLET: 5; 325 TABLET ORAL at 17:00

## 2017-02-20 RX ADMIN — IBUPROFEN 600 MG: 600 TABLET ORAL at 22:10

## 2017-02-20 RX ADMIN — OXYCODONE AND ACETAMINOPHEN 1 TABLET: 5; 325 TABLET ORAL at 13:00

## 2017-02-20 RX ADMIN — DOCUSATE SODIUM 100 MG: 100 CAPSULE, LIQUID FILLED ORAL at 08:13

## 2017-02-20 RX ADMIN — IBUPROFEN 600 MG: 600 TABLET ORAL at 16:55

## 2017-02-20 RX ADMIN — TETANUS TOXOID, REDUCED DIPHTHERIA TOXOID AND ACELLULAR PERTUSSIS VACCINE, ADSORBED 0.5 ML: 5; 2.5; 8; 8; 2.5 SUSPENSION INTRAMUSCULAR at 06:18

## 2017-02-20 RX ADMIN — POLYETHYLENE GLYCOL 3350 17 G: 17 POWDER, FOR SOLUTION ORAL at 08:13

## 2017-02-20 RX ADMIN — OXYCODONE AND ACETAMINOPHEN 1 TABLET: 5; 325 TABLET ORAL at 08:20

## 2017-02-20 NOTE — PROGRESS NOTES
JOSUE Pearson  Geraldine Enrique  : 1995  MRN: [id number]  CSN: [id number]    Postpartum Day #1  Subjective   Her pain is well controlled.  Vaginal bleeding is less than a normal period.       Objective     Min/max vitals past 24 hours:   Temp  Min: 97.6 °F (36.4 °C)  Max: 99 °F (37.2 °C)  BP  Min: 101/56  Max: 142/65  Pulse  Min: 57  Max: 151  Resp  Min: 16  Max: 18        Abdomen: soft, non-tender; bowel sounds normal; no masses   fundus firm and non-tender   Pelvic: deferred       Results from last 7 days  Lab Units 17  0918 17  0350   WBC 10*3/mm3  --  9.18   HEMOGLOBIN g/dL 9.2* 10.5*   HEMATOCRIT % 29.0* 32.9*   PLATELETS 10*3/mm3  --  216     Lab Results   Component Value Date    RH Positive 2017    HEPBSAG Negative 2016        Assessment   1. Postpartum Day #1 S/P vaginal delivery     Plan   1. Continue routine postpartum care    Louie Velazquez MD  2017  12:37 PM

## 2017-02-20 NOTE — L&D DELIVERY NOTE
[facility]  Vaginal Delivery Note    Delivery     Delivery: Vaginal, Spontaneous Delivery     YOB: 2019    Time of Birth: 9:32 PM      Anesthesia: None     Delivering clinician: Louie Velazquez    Forceps?   No   Vacuum? No    Shoulder dystocia present: No        Delivery narrative:  She crowned the infant up in occiput anterior presentation.  After injecting with 1% lidocaine a second-degree midline episiotomy was created.  Due to concerns about fetal macrosomia of the head of the bed was made flat and she was placed into Dewey position.  Infant delivered with the left shoulder anterior.  Turtle sign was absent.  The left shoulder was rotated in a clockwise fashion under the symphysis and then the infant delivered without incident.  Mouth and nose were bulb suctioned and the placenta spontaneously delivered.  Rectal muscle was intact.  There was some splaying of the rectovaginal fascia over the external anal sphincter.  Repair was accomplished in layers with 2-0 Vicryl used to plicate the rectovaginal fascia including a small split over the right bulbocavernosus muscle which was repaired independently.  Repair was then completed with 3-0 chromic in standard fashion.  The skin was closed with interrupted figure-of-eight 4-0 Monocryl.    Infant    Findings: male  infant     Infant observations: Weight: 9 lb 7.7 oz (4.3 kg)   Length: 21  in     Apgars: 8   @ 1 minute /    9   @ 5 minutes   Infant Name: Chase     Placenta, Cord, and Fluid    Placenta delivered  Spontaneous  at   2/19  9:37 PM     Cord: 3 vessels  present.   Nuchal Cord?  no   Cord blood obtained: Yes    Cord gases obtained:  No      Repair    Episiotomy: Median    Lacerations: No   Estimated Blood Loss: Est. Blood Loss (mL): 300 mL (Filed from Delivery Summary) (02/19/17 5144)     Suture used for repair:  see above      Complications  none    Disposition  Mother to Mother Baby/Postpartum  in stable condition currently.  Baby  to NBN  in stable condition currently.      Louie Velazquez MD  02/19/17  10:33 PM

## 2017-02-20 NOTE — PLAN OF CARE
Problem: Postpartum, Vaginal Delivery (Adult)  Goal: Signs and Symptoms of Listed Potential Problems Will be Absent or Manageable (Postpartum, Vaginal Delivery)  Outcome: Ongoing (interventions implemented as appropriate)    Problem: Breastfeeding (Adult,NICU,[place],Obstetrics,Pediatric)  Goal: Signs and Symptoms of Listed Potential Problems Will be Absent or Manageable (Breastfeeding)  Outcome: Ongoing (interventions implemented as appropriate)

## 2017-02-20 NOTE — PROGRESS NOTES
JOSUE [place]  Geraldine Enrique  : 1995  MRN: [id number]  CSN: [id number]    Labor progress note    Subjective   She reports painful contraction and pelvic pressure     Objective   Min/max vitals past 24 hours:  Temp  Min: 97.6 °F (36.4 °C)  Max: 98.6 °F (37 °C)   BP  Min: 105/58  Max: 140/82   Pulse  Min: 57  Max: 97   Resp  Min: 17  Max: 18        FHT's: reassuring and category 1   Cervix: was checked: 9 cm / 100 % / +1 / SHALONDA   Contractions: regular      Assessment   1. IUP at 39w3d  2. Progressing in labor  3. Fetal status reassuring     Plan   1. Continue with augmentation    Louie Velazquez MD  2017  8:47 PM

## 2017-02-20 NOTE — PLAN OF CARE
Problem: Patient Care Overview (Adult)  Goal: Plan of Care Review  Outcome: Outcome(s) achieved Date Met:  17  Goal: Adult Individualization and Mutuality  Outcome: Outcome(s) achieved Date Met:  17   Individualization   Patient Specific Preferences Natural delivery; Breastfeeding       Goal: Discharge Needs Assessment  Outcome: Outcome(s) achieved Date Met:  17   Discharge Needs Assessment   Concerns To Be Addressed no discharge needs identified   Readmission Within The Last 30 Days no previous admission in last 30 days   Equipment Needed After Discharge none   Discharge Disposition home or self-care   Current Health   Anticipated Changes Related to Illness none   Living Environment   Transportation Available car         Problem: Labor (Cervical Ripen, Induct, Augment) (Adult,Obstetrics,Pediatric)  Goal: Signs and Symptoms of Listed Potential Problems Will be Absent or Manageable (Labor)  Outcome: Outcome(s) achieved Date Met:  17   Labor (Cervical Ripen, Induct, Augment)   Problems Assessed (Labor) all   Problems Present (Labor) none         Problem: Postpartum, Vaginal Delivery (Adult)  Goal: Signs and Symptoms of Listed Potential Problems Will be Absent or Manageable (Postpartum, Vaginal Delivery)  Outcome: Ongoing (interventions implemented as appropriate)    17   Postpartum, Vaginal Delivery   Problems Assessed (Postpartum Vaginal Delivery) all   Problems Present (Postpartum Vaginal Delivery) none         Problem: Breastfeeding (Adult,NICU,[place],Obstetrics,Pediatric)  Goal: Signs and Symptoms of Listed Potential Problems Will be Absent or Manageable (Breastfeeding)  Outcome: Ongoing (interventions implemented as appropriate)    17   Breastfeeding   Problems Assessed (Breastfeeding) all   Problems Present (Breastfeeding) none

## 2017-02-20 NOTE — NURSING NOTE
Patient ambulated to bathroom without difficulty. Gait steady.  States unable to void. [place] care done, pads and panties applied.  VS stable, pt alert, oriented, and smiling.  Transferred to MB via W/C with baby, FOB, and belongings.  Pt denies further needs at this time.  Report given to MB nurse Awa WILKS to assume care.

## 2017-02-20 NOTE — PLAN OF CARE
Problem: Breastfeeding (Adult,NICU,[place],Obstetrics,Pediatric)  Goal: Signs and Symptoms of Listed Potential Problems Will be Absent or Manageable (Breastfeeding)  Outcome: Ongoing (interventions implemented as appropriate)

## 2017-02-21 VITALS
TEMPERATURE: 98.5 F | SYSTOLIC BLOOD PRESSURE: 116 MMHG | HEART RATE: 81 BPM | BODY MASS INDEX: 32.14 KG/M2 | HEIGHT: 66 IN | DIASTOLIC BLOOD PRESSURE: 64 MMHG | RESPIRATION RATE: 16 BRPM | WEIGHT: 200 LBS

## 2017-02-21 PROCEDURE — 99238 HOSP IP/OBS DSCHRG MGMT 30/<: CPT | Performed by: OBSTETRICS & GYNECOLOGY

## 2017-02-21 RX ORDER — IBUPROFEN 600 MG/1
600 TABLET ORAL EVERY 6 HOURS PRN
Refills: 0
Start: 2017-02-21 | End: 2018-09-13

## 2017-02-21 RX ORDER — OXYCODONE HYDROCHLORIDE AND ACETAMINOPHEN 5; 325 MG/1; MG/1
1-2 TABLET ORAL EVERY 4 HOURS PRN
Qty: 24 TABLET | Refills: 0 | Status: SHIPPED | OUTPATIENT
Start: 2017-02-21 | End: 2017-04-10

## 2017-02-21 RX ADMIN — IBUPROFEN 600 MG: 600 TABLET ORAL at 06:30

## 2017-02-21 RX ADMIN — DOCUSATE SODIUM 100 MG: 100 CAPSULE, LIQUID FILLED ORAL at 09:35

## 2017-02-21 RX ADMIN — OXYCODONE AND ACETAMINOPHEN 1 TABLET: 5; 325 TABLET ORAL at 06:30

## 2017-02-21 NOTE — DISCHARGE SUMMARY
Discharge Summary     Michel Enrique  : 1995  MRN: [id number]  CSN: [id number]    Date of Admission: 2017   Date of Discharge:  2017   Delivering Physician: Louie Velazquez        Admission Diagnosis: 1. Normal labor [O80, Z37.9]     Discharge Diagnosis: 1. Same as above plus  2. Pregnancy at 39w3d - delivered       Procedures: 2017  - Vaginal, Spontaneous Delivery       Hospital Course  Patient is a 21 y.o.  who at 39w3d had a vaginal birth.  Her postpartum course was without complications.  On PPD #2 she was ready for discharge.  She had normal lochia and pain well controlled with oral medications.    Infant  male  fetus weighing 9 lb 7.7 oz (4.3 kg)   Apgars -  8  @ 1 minute /  9  @ 5 minutes.    Discharge labs  Lab Results   Component Value Date    WBC 9.18 2017    HGB 9.2 (L) 2017    HCT 29.0 (L) 2017     2017       Discharge Medications   Geraldine Enrique   Home Medication Instructions GABY:[id number]    Printed on:17 1124   Medication Information                      ferrous sulfate 325 (65 FE) MG tablet  Take 1 tablet by mouth 2 (Two) Times a Day Before Meals.             ibuprofen (ADVIL,MOTRIN) 600 MG tablet  Take 1 tablet by mouth Every 6 (Six) Hours As Needed for mild pain (1-3).             omeprazole (priLOSEC) 20 MG capsule  Take 20 mg by mouth Daily.             oxyCODONE-acetaminophen (PERCOCET) 5-325 MG per tablet  Take 1-2 tablets by mouth Every 4 (Four) Hours As Needed for moderate pain (4-6).             Prenatal Vit-Fe Fumarate-FA (PRENATAL, CLASSIC, VITAMIN) 28-0.8 MG tablet tablet  Take  by mouth Daily.                 Discharge Disposition Home or Self Care   Condition on Discharge: good   Follow-up: 6 weeks with Dr. Caroline eVlazquez MD  2017

## 2017-02-21 NOTE — PROGRESS NOTES
JOSUE [place]  Geraldine Enrique  : 1995  MRN: [id number]  CSN: [id number]    Postpartum Day #2  Subjective   Her pain is well controlled.  Vaginal bleeding is less than a normal period.      Objective     Min/max vitals past 24 hours:   Temp  Min: 98.2 °F (36.8 °C)  Max: 98.5 °F (36.9 °C)  BP  Min: 91/55  Max: 116/64  Pulse  Min: 75  Max: 81  Resp  Min: 16  Max: 16        General: well developed; well nourished  no acute distress   Abdomen: fundus firm and non-tender   Pelvic: Not performed   Ext: Calves NT       Results from last 7 days  Lab Units 17  0918 17  0350   WBC 10*3/mm3  --  9.18   HEMOGLOBIN g/dL 9.2* 10.5*   HEMATOCRIT % 29.0* 32.9*   PLATELETS 10*3/mm3  --  216     Lab Results   Component Value Date    RH Positive 2017    HEPBSAG Negative 2016        Assessment   1. Postpartum Day #2 S/P vaginal delivery  2. Postpartum anemia     Plan   1. Discharge to home  2. Advised no tampons or intercourse for 6 weeks.  3. D/C questions all answered  4. Follow-up appointment in 6 week(s)    Louie Velazquez MD  2017  11:03 AM

## 2017-02-21 NOTE — PLAN OF CARE
Problem: Patient Care Overview (Adult)  Goal: Plan of Care Review  Outcome: Outcome(s) achieved Date Met:  17 1159   Coping/Psychosocial Response Interventions   Plan Of Care Reviewed With patient   Patient Care Overview   Progress improving   Outcome Evaluation   Outcome Summary/Follow up Plan Patient promting baby's feedings, engaged in self care. Fundus firm, u/2, scant rubra       Goal: Adult Individualization and Mutuality  Outcome: Outcome(s) achieved Date Met:  17  Goal: Discharge Needs Assessment  Outcome: Outcome(s) achieved Date Met:  17    Problem: Breastfeeding (Adult,NICU,[place],Obstetrics,Pediatric)  Goal: Signs and Symptoms of Listed Potential Problems Will be Absent or Manageable (Breastfeeding)  Outcome: Outcome(s) achieved Date Met:  17

## 2017-02-21 NOTE — PLAN OF CARE
Problem: Patient Care Overview (Adult)  Goal: Plan of Care Review  Outcome: Ongoing (interventions implemented as appropriate)    17 0528   Coping/Psychosocial Response Interventions   Plan Of Care Reviewed With patient   Patient Care Overview   Progress improving   Outcome Evaluation   Outcome Summary/Follow up Plan VSS, voiding, breastfeeding       Goal: Adult Individualization and Mutuality  Outcome: Ongoing (interventions implemented as appropriate)  Goal: Discharge Needs Assessment  Outcome: Ongoing (interventions implemented as appropriate)    Problem: Postpartum, Vaginal Delivery (Adult)  Goal: Signs and Symptoms of Listed Potential Problems Will be Absent or Manageable (Postpartum, Vaginal Delivery)  Outcome: Ongoing (interventions implemented as appropriate)    Problem: Breastfeeding (Adult,NICU,,Obstetrics,Pediatric)  Goal: Signs and Symptoms of Listed Potential Problems Will be Absent or Manageable (Breastfeeding)  Outcome: Ongoing (interventions implemented as appropriate)

## 2017-02-22 ENCOUNTER — TELEPHONE (OUTPATIENT)
Dept: OBSTETRICS AND GYNECOLOGY | Facility: CLINIC | Age: 22
End: 2017-02-22

## 2017-02-22 NOTE — TELEPHONE ENCOUNTER
----- Message from Enedelia Becker sent at 2/22/2017 12:25 PM EST -----  PT DELIVERED Sunday AND IS CONCERNED ABOUT SOME SWELLING.  PLEASE CALL HER [phone].

## 2017-02-23 NOTE — TELEPHONE ENCOUNTER
Spoke to pt. Her ankles and legs are swollen more since she has been home. No other problems. She wasn't drinking much water.

## 2017-03-03 ENCOUNTER — TELEPHONE (OUTPATIENT)
Dept: OBSTETRICS AND GYNECOLOGY | Facility: CLINIC | Age: 22
End: 2017-03-03

## 2017-03-03 NOTE — TELEPHONE ENCOUNTER
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  Regarding the yeast in the breast, she's having painful stinging breasts each time she feeds.  I did recommend she use over-the-counter Monistat after each feed and get her son checked out for thrush.  As it relates to the headache and feeling poor, I told her if the symptoms persisted and she really needs to come on in to be evaluated to make sure this is not postpartum preeclampsia.

## 2017-04-10 ENCOUNTER — POSTPARTUM VISIT (OUTPATIENT)
Dept: OBSTETRICS AND GYNECOLOGY | Facility: CLINIC | Age: 22
End: 2017-04-10

## 2017-04-10 ENCOUNTER — APPOINTMENT (OUTPATIENT)
Dept: LAB | Facility: HOSPITAL | Age: 22
End: 2017-04-10

## 2017-04-10 VITALS
RESPIRATION RATE: 14 BRPM | DIASTOLIC BLOOD PRESSURE: 70 MMHG | WEIGHT: 154 LBS | BODY MASS INDEX: 24.86 KG/M2 | SYSTOLIC BLOOD PRESSURE: 120 MMHG

## 2017-04-10 LAB — HGB BLD-MCNC: 12.4 G/DL (ref 11.5–15.5)

## 2017-04-10 PROCEDURE — 36415 COLL VENOUS BLD VENIPUNCTURE: CPT | Performed by: OBSTETRICS & GYNECOLOGY

## 2017-04-10 PROCEDURE — 85018 HEMOGLOBIN: CPT | Performed by: OBSTETRICS & GYNECOLOGY

## 2017-04-10 NOTE — PROGRESS NOTES
Subjective   Chief Complaint   Patient presents with   • Postpartum Care     Geraldine Enrique is a 21 y.o. year old  presenting to be seen for her postpartum visit.  She had a vaginal delivery.  Her son is doing well.    Since delivery she has not been sexually active.  She does not have concerns about post-partum blues/depression.   She is breast feeding and plans to continues for 6 month(s).  For ongoing contraception, her plans are condoms.    The following portions of the patient's history were reviewed and updated as appropriate:current medications, allergies and past surgical history    Smoking status: Never Smoker                                                                 Smokeless status: Not on file                         Review of Systems  Constitutional POS: nothing reported    NEG: anorexia or night sweats   Gastointestinal POS: nothing reported    NEG: bloating, change in bowel habits, melena or reflux symptoms   Genitourinary POS: nothing reported    NEG: dysuria or hematuria   Integument POS: nothing reported    NEG: moles that are changing in size, shape, color or rashes   Breast POS: nothing reported    NEG: persistent breast lump, skin dimpling or nipple discharge        Objective   /70  Resp 14  Wt 154 lb (69.9 kg)  LMP 2016  Breastfeeding? Yes  BMI 24.86 kg/m2    General:  well developed; well nourished  no acute distress   Abdomen: soft, non-tender; no masses  no umbilical or inginual hernias are present  no hepato-splenomegaly   Pelvis: Clinical staff was present for exam  External genitalia:  normal appearance of the external genitalia including Bartholin's and Tolsona's glands.  :  urethral meatus normal; urethral hypermobility is absent.  Vaginal:  normal pink mucosa without prolapse or lesions.  Cervix:  normal appearance.  Uterus:  normal size, shape and consistency. retroverted; fully involuted  Adnexa:  normal bimanual exam of the adnexa.  Rectal:   digital rectal exam not performed; anus visually normal appearing.          Assessment   1. Normal 6 week postpartum exam  2. Sub-optimal BC - she currently is not taking sufficient folic acid     Plan   1. BC options reviewed and compared today: condoms   2. Folic acid for the prevention of neural tube defects was discussed.  At least 0.4 mg should be taken preconceptionally to reduce the risk.  It was explained that this may reduce the baseline incidence of neural tube defect by as much as 65%.  Folic acid can be found in OTC multivitamins, OTC prenatal vitamins or breakfast cereal that that contains 100% of the RDA of folate.  3. The following tests were ordered today: HgB.  It was explained to Geraldine that all lab test should be back within the one week after they are performed. She will be notified about the results, regardless of the findings. If she has not been contacted by the office within 2 weeks after the test has been performed, it is her responsibility to contact us to learn about her results.  4. Follow up for annual exam 3 months       This note was electronically signed.    Louie Velazquez M.D.  April 10, 2017

## 2018-07-10 ENCOUNTER — TELEPHONE (OUTPATIENT)
Dept: OBSTETRICS AND GYNECOLOGY | Facility: CLINIC | Age: 23
End: 2018-07-10

## 2018-07-10 DIAGNOSIS — N39.0 URINARY TRACT INFECTION WITHOUT HEMATURIA, SITE UNSPECIFIED: Primary | ICD-10-CM

## 2018-07-10 NOTE — TELEPHONE ENCOUNTER
Dr Velazquez    Pt feels likes she has to urinate but is unable. Having a lot of pressure and sharp pain like previous kidney stones.    Pt call back [phone]

## 2018-07-11 ENCOUNTER — LAB (OUTPATIENT)
Dept: LAB | Facility: HOSPITAL | Age: 23
End: 2018-07-11

## 2018-07-11 DIAGNOSIS — N39.0 URINARY TRACT INFECTION WITHOUT HEMATURIA, SITE UNSPECIFIED: ICD-10-CM

## 2018-07-11 LAB
BILIRUB UR QL STRIP: NEGATIVE
CLARITY UR: CLEAR
COLOR UR: YELLOW
GLUCOSE UR STRIP-MCNC: NEGATIVE MG/DL
HGB UR QL STRIP.AUTO: NEGATIVE
KETONES UR QL STRIP: NEGATIVE
LEUKOCYTE ESTERASE UR QL STRIP.AUTO: NEGATIVE
NITRITE UR QL STRIP: NEGATIVE
PH UR STRIP.AUTO: 6 [PH] (ref 5–8)
PROT UR QL STRIP: NEGATIVE
SP GR UR STRIP: 1.02 (ref 1–1.03)
UROBILINOGEN UR QL STRIP: NORMAL

## 2018-07-11 PROCEDURE — 81003 URINALYSIS AUTO W/O SCOPE: CPT

## 2018-09-07 PROBLEM — Z34.80 PRENATAL CARE, SUBSEQUENT PREGNANCY: Status: ACTIVE | Noted: 2018-09-07

## 2018-09-13 ENCOUNTER — APPOINTMENT (OUTPATIENT)
Dept: LAB | Facility: HOSPITAL | Age: 23
End: 2018-09-13

## 2018-09-13 ENCOUNTER — INITIAL PRENATAL (OUTPATIENT)
Dept: OBSTETRICS AND GYNECOLOGY | Facility: CLINIC | Age: 23
End: 2018-09-13

## 2018-09-13 VITALS — WEIGHT: 157 LBS | BODY MASS INDEX: 25.34 KG/M2 | DIASTOLIC BLOOD PRESSURE: 66 MMHG | SYSTOLIC BLOOD PRESSURE: 108 MMHG

## 2018-09-13 DIAGNOSIS — Z34.81 PRENATAL CARE, SUBSEQUENT PREGNANCY IN FIRST TRIMESTER: Primary | ICD-10-CM

## 2018-09-13 LAB
ABO GROUP BLD: NORMAL
AMPHET+METHAMPHET UR QL: NEGATIVE
AMPHETAMINES UR QL: NEGATIVE
BARBITURATES UR QL SCN: NEGATIVE
BASOPHILS # BLD AUTO: 0.02 10*3/MM3 (ref 0–0.2)
BASOPHILS NFR BLD AUTO: 0.4 % (ref 0–1)
BENZODIAZ UR QL SCN: NEGATIVE
BLD GP AB SCN SERPL QL: NEGATIVE
BUPRENORPHINE SERPL-MCNC: NEGATIVE NG/ML
CANNABINOIDS SERPL QL: NEGATIVE
COCAINE UR QL: NEGATIVE
DEPRECATED RDW RBC AUTO: 41.9 FL (ref 37–54)
EOSINOPHIL # BLD AUTO: 0.1 10*3/MM3 (ref 0–0.3)
EOSINOPHIL NFR BLD AUTO: 1.8 % (ref 0–3)
ERYTHROCYTE [DISTWIDTH] IN BLOOD BY AUTOMATED COUNT: 13.7 % (ref 11.3–14.5)
HBV SURFACE AG SERPL QL IA: NORMAL
HCT VFR BLD AUTO: 40.2 % (ref 34.5–44)
HCV AB SER DONR QL: NORMAL
HGB BLD-MCNC: 13.2 G/DL (ref 11.5–15.5)
HIV1+2 AB SER QL: NORMAL
IMM GRANULOCYTES # BLD: 0.01 10*3/MM3 (ref 0–0.03)
IMM GRANULOCYTES NFR BLD: 0.2 % (ref 0–0.6)
LYMPHOCYTES # BLD AUTO: 1.38 10*3/MM3 (ref 0.6–4.8)
LYMPHOCYTES NFR BLD AUTO: 24.6 % (ref 24–44)
MCH RBC QN AUTO: 27.4 PG (ref 27–31)
MCHC RBC AUTO-ENTMCNC: 32.8 G/DL (ref 32–36)
MCV RBC AUTO: 83.4 FL (ref 80–99)
METHADONE UR QL SCN: NEGATIVE
MONOCYTES # BLD AUTO: 0.6 10*3/MM3 (ref 0–1)
MONOCYTES NFR BLD AUTO: 10.7 % (ref 0–12)
NEUTROPHILS # BLD AUTO: 3.5 10*3/MM3 (ref 1.5–8.3)
NEUTROPHILS NFR BLD AUTO: 62.5 % (ref 41–71)
OPIATES UR QL: NEGATIVE
OXYCODONE UR QL SCN: NEGATIVE
PCP UR QL SCN: NEGATIVE
PLATELET # BLD AUTO: 207 10*3/MM3 (ref 150–450)
PMV BLD AUTO: 10.1 FL (ref 6–12)
PROPOXYPH UR QL: NEGATIVE
RBC # BLD AUTO: 4.82 10*6/MM3 (ref 3.89–5.14)
RH BLD: POSITIVE
RUBV IGG SER QL: NORMAL
RUBV IGG SER-ACNC: 148.4 IU/ML
TRICYCLICS UR QL SCN: NEGATIVE
WBC NRBC COR # BLD: 5.6 10*3/MM3 (ref 3.5–10.8)

## 2018-09-13 PROCEDURE — 80306 DRUG TEST PRSMV INSTRMNT: CPT | Performed by: OBSTETRICS & GYNECOLOGY

## 2018-09-13 PROCEDURE — 99214 OFFICE O/P EST MOD 30 MIN: CPT | Performed by: OBSTETRICS & GYNECOLOGY

## 2018-09-13 PROCEDURE — 86803 HEPATITIS C AB TEST: CPT | Performed by: OBSTETRICS & GYNECOLOGY

## 2018-09-13 PROCEDURE — 87086 URINE CULTURE/COLONY COUNT: CPT | Performed by: OBSTETRICS & GYNECOLOGY

## 2018-09-13 PROCEDURE — 36415 COLL VENOUS BLD VENIPUNCTURE: CPT | Performed by: OBSTETRICS & GYNECOLOGY

## 2018-09-13 PROCEDURE — 80081 OBSTETRIC PANEL INC HIV TSTG: CPT | Performed by: OBSTETRICS & GYNECOLOGY

## 2018-09-13 NOTE — PROGRESS NOTES
Subjective   Chief Complaint   Patient presents with   • Initial Prenatal Visit       Geraldine Enrique is a 22 y.o. year old .  Patient's last menstrual period was 2018 (approximate).  She presents to be seen to initiate prenatal care.     Smoking status: Never Smoker                                                                 Smokeless tobacco: Not on file                         The following portions of the patient's history were reviewed and updated as appropriate:vital signs, allergies, current medications, past medical history, past social history, past surgical history and problem list.    Objective   Lab Review   No data reviewed    Imaging   Pelvic ultrasound report    Assessment/Plan   ASSESSMENT  1. 22 y.o. year old  at 8w5d  2. Supervision of low risk pregnancy    PLAN  1. The problem list for pregnancy was initiated today  2. Tests ordered today:  Orders Placed This Encounter   Procedures   • Urine Culture - Urine, Urine, Clean Catch   • HIV-1 / O / 2 Ag / Antibody 4th Generation   • Obstetric Panel   • Urine Drug Screen - Urine, Clean Catch     Please confirm all positive UDS     3. Testing for GC / Chlamydia / trichomonas will need to be done at her next prenatal visit  4. Genetic testing reviewed: MSAFP-4 and NIPT (Panorama)  5. Information reviewed: exercise in pregnancy, nutrition in pregnancy, weight gain in pregnancy, work and travel restrictions during pregnancy, list of OTC medications acceptable in pregnancy and call coverage groups    Total time spent today with Geraldine  was 30 minutes (level 4).  Off this time, 90% was spent face-to-face time coordinating care, answering her questions and counseling regarding pathophysiology of her presenting problem along with plans for any diagnositc work-up and treatment.    Follow up: 3 week(s)       This note was electronically signed.    Louie Velazquez MD  2018

## 2018-09-14 LAB — RPR SER QL: NORMAL

## 2018-09-15 LAB — BACTERIA SPEC AEROBE CULT: NORMAL

## 2018-09-20 ENCOUNTER — TELEPHONE (OUTPATIENT)
Dept: OBSTETRICS AND GYNECOLOGY | Facility: CLINIC | Age: 23
End: 2018-09-20

## 2018-09-20 NOTE — TELEPHONE ENCOUNTER
Dr. Velazquez Pt    Pt is about 9 and a half weeks and has been having cramps and lower belly pain since last night.  She has had no bleeding.  She is nauseous and feels back.    Callback [phone]    [facility]

## 2018-09-20 NOTE — TELEPHONE ENCOUNTER
So long as she is not bleeding, things are probably okay.  Continue find out if she is having any issues with painful urination, fever, constipation or anything else?

## 2018-10-04 ENCOUNTER — ROUTINE PRENATAL (OUTPATIENT)
Dept: OBSTETRICS AND GYNECOLOGY | Facility: CLINIC | Age: 23
End: 2018-10-04

## 2018-10-04 ENCOUNTER — LAB REQUISITION (OUTPATIENT)
Dept: LAB | Facility: HOSPITAL | Age: 23
End: 2018-10-04

## 2018-10-04 VITALS — WEIGHT: 158 LBS | BODY MASS INDEX: 25.5 KG/M2 | DIASTOLIC BLOOD PRESSURE: 64 MMHG | SYSTOLIC BLOOD PRESSURE: 110 MMHG

## 2018-10-04 DIAGNOSIS — Z34.81 PRENATAL CARE, SUBSEQUENT PREGNANCY IN FIRST TRIMESTER: Primary | ICD-10-CM

## 2018-10-04 DIAGNOSIS — Z00.00 ROUTINE GENERAL MEDICAL EXAMINATION AT A HEALTH CARE FACILITY: ICD-10-CM

## 2018-10-04 DIAGNOSIS — Z36.9 ANTENATAL SCREENING ENCOUNTER: ICD-10-CM

## 2018-10-04 LAB
C TRACH RRNA SPEC DONR QL NAA+PROBE: NEGATIVE
N GONORRHOEA DNA SPEC QL NAA+PROBE: NEGATIVE

## 2018-10-04 PROCEDURE — 36415 COLL VENOUS BLD VENIPUNCTURE: CPT

## 2018-10-04 PROCEDURE — 99213 OFFICE O/P EST LOW 20 MIN: CPT | Performed by: OBSTETRICS & GYNECOLOGY

## 2018-10-04 NOTE — PROGRESS NOTES
Chief Complaint   Patient presents with   • Routine Prenatal Visit       HPI: Geraldine is a  currently at 11w5d who today reports the following:  Nausea - No; Vaginal bleeding -  No; Heartburn - No.    ROS:  GI: Constipation - No; Diarrhea - No    Neuro: Headache - No; Visual change - No      EXAM:  Vitals: See prenatal flowsheet   Abdomen: See prenatal flowsheet   Urine glucose/protein: See prenatal flowsheet   Pelvic: See prenatal flowsheet     Prenatal Labs  Lab Results   Component Value Date    HGB 13.2 2018    RUBELLAABIGG 148.4 2018    RUBELLAABIGG Immune 2018    HEPBSAG Non-Reactive 2018    ABSCRN Negative 2018    JCE5PAL1 Non-Reactive 2018    HEPCVIRUSABY Non-Reactive 2018     2016    STREPGPB Negative 2017    URINECX <10,000 CFU/mL Normal Urogenital Mendy 2018       MDM:  Impression: 1. Supervision of low risk pregnancy   Tests done today: 1. GC/Chlamydia testing   2. NIPT - panorama   Topics discussed: 1. Continue with PNV's  2. Prenatal labs reviewed  3. flu vaccine    Tests scheduled today for her next visit:   none

## 2018-11-05 ENCOUNTER — ROUTINE PRENATAL (OUTPATIENT)
Dept: OBSTETRICS AND GYNECOLOGY | Facility: CLINIC | Age: 23
End: 2018-11-05

## 2018-11-05 VITALS — WEIGHT: 162 LBS | BODY MASS INDEX: 26.15 KG/M2 | SYSTOLIC BLOOD PRESSURE: 108 MMHG | DIASTOLIC BLOOD PRESSURE: 68 MMHG

## 2018-11-05 DIAGNOSIS — Z34.82 PRENATAL CARE, SUBSEQUENT PREGNANCY IN SECOND TRIMESTER: Primary | ICD-10-CM

## 2018-11-05 LAB — AFP INTERP SERPL-IMP: NORMAL

## 2018-11-05 PROCEDURE — 99213 OFFICE O/P EST LOW 20 MIN: CPT | Performed by: OBSTETRICS & GYNECOLOGY

## 2018-11-05 NOTE — PROGRESS NOTES
Chief Complaint   Patient presents with   • Routine Prenatal Visit       HPI: Geraldine is a  currently at 16w2d who today reports the following:  Contractions - No; Leaking - No; Vaginal bleeding -  No; Heartburn - No.    ROS:  GI: Nausea - No ; Constipation - No; Diarrhea - No    Neuro: Headache - No ; Visual change - No      EXAM:  Vitals: See prenatal flowsheet   Abdomen: See prenatal flowsheet   Urine glucose/protein: See prenatal flowsheet   Pelvic: See prenatal flowsheet     Lab Results   Component Value Date    ABO A 2018    RH Positive 2018    ABSCRN Negative 2018       MDM:  Impression: 1. Supervision of low risk pregnancy   Tests done today: 1. none   Topics discussed: 1. Continue with PNV's  2. Prenatal labs reviewed  3. AFP for NTD - declines   Tests scheduled today for her next visit:   U/S for anatomic screening

## 2018-11-29 ENCOUNTER — TELEPHONE (OUTPATIENT)
Dept: OBSTETRICS AND GYNECOLOGY | Facility: CLINIC | Age: 23
End: 2018-11-29

## 2018-11-29 NOTE — TELEPHONE ENCOUNTER
Pt called in 20wks in regards to getting her hair colored. Dr. Lamar was present in office, per Dr. Lamar she advised for pt to not get her hair colored.    Pt was advised.

## 2018-12-05 ENCOUNTER — ROUTINE PRENATAL (OUTPATIENT)
Dept: OBSTETRICS AND GYNECOLOGY | Facility: CLINIC | Age: 23
End: 2018-12-05

## 2018-12-05 ENCOUNTER — LAB (OUTPATIENT)
Dept: LAB | Facility: HOSPITAL | Age: 23
End: 2018-12-05

## 2018-12-05 VITALS — WEIGHT: 168 LBS | DIASTOLIC BLOOD PRESSURE: 66 MMHG | BODY MASS INDEX: 27.12 KG/M2 | SYSTOLIC BLOOD PRESSURE: 110 MMHG

## 2018-12-05 DIAGNOSIS — Z34.82 PRENATAL CARE, SUBSEQUENT PREGNANCY, SECOND TRIMESTER: ICD-10-CM

## 2018-12-05 DIAGNOSIS — Z34.82 PRENATAL CARE, SUBSEQUENT PREGNANCY IN SECOND TRIMESTER: Primary | ICD-10-CM

## 2018-12-05 DIAGNOSIS — Z34.82 PRENATAL CARE, SUBSEQUENT PREGNANCY, SECOND TRIMESTER: Primary | ICD-10-CM

## 2018-12-05 LAB
CREAT UR-MCNC: 132.7 MG/DL
PROT UR-MCNC: 10 MG/DL (ref 1–14)

## 2018-12-05 PROCEDURE — 99213 OFFICE O/P EST LOW 20 MIN: CPT | Performed by: OBSTETRICS & GYNECOLOGY

## 2018-12-05 PROCEDURE — 82570 ASSAY OF URINE CREATININE: CPT

## 2018-12-05 PROCEDURE — 84156 ASSAY OF PROTEIN URINE: CPT

## 2018-12-05 NOTE — PROGRESS NOTES
Chief Complaint   Patient presents with   • Routine Prenatal Visit       HPI: Geraldine is a  currently at 20w4d who today reports the following:  Contractions - No; Leaking - No; Vaginal bleeding -  No; Heartburn - No.    ROS:  GI: Nausea - No ; Constipation - No; Diarrhea - No    Neuro: Headache - No ; Visual change - No      EXAM:  Vitals: See prenatal flowsheet   Abdomen: See prenatal flowsheet   Urine glucose/protein: See prenatal flowsheet   Pelvic: See prenatal flowsheet     Lab Results   Component Value Date    ABO A 2018    RH Positive 2018    ABSCRN Negative 2018       MDM:  Impression: 1. Supervision of low risk pregnancy   Tests done today: 1. U/S for anatomic screening - anatomy was not completely seen today   Topics discussed: 1. Continue with PNV's  2. Prenatal labs reviewed  3. none - she had no major complaints,questions or concerns   Tests scheduled today for her next visit:   U/S to complete the anatomic screening

## 2018-12-27 ENCOUNTER — TELEPHONE (OUTPATIENT)
Dept: OBSTETRICS AND GYNECOLOGY | Facility: CLINIC | Age: 23
End: 2018-12-27

## 2018-12-27 NOTE — TELEPHONE ENCOUNTER
Provider Name  Dr Velazquez    Reason for Call  Patient is 23.5 weeks pregnant, not able to keep anything down today, started vomiting around 11am this morning, having chills and very tired, please call her  (patient is out of town in [place])    Call Back Number  [phone]

## 2018-12-27 NOTE — TELEPHONE ENCOUNTER
Feels bad - has chills.  Not checked her temperature.  No one else is ill.  May need to seen to r/o pyelonephritis.  She will be seen locally in [place].

## 2019-01-04 ENCOUNTER — ROUTINE PRENATAL (OUTPATIENT)
Dept: OBSTETRICS AND GYNECOLOGY | Facility: CLINIC | Age: 24
End: 2019-01-04

## 2019-01-04 VITALS — DIASTOLIC BLOOD PRESSURE: 68 MMHG | SYSTOLIC BLOOD PRESSURE: 116 MMHG | WEIGHT: 172 LBS | BODY MASS INDEX: 27.76 KG/M2

## 2019-01-04 DIAGNOSIS — Z34.82 PRENATAL CARE, SUBSEQUENT PREGNANCY IN SECOND TRIMESTER: Primary | ICD-10-CM

## 2019-01-04 DIAGNOSIS — B37.31 CANDIDIASIS OF VAGINA: ICD-10-CM

## 2019-01-04 PROCEDURE — 0502F SUBSEQUENT PRENATAL CARE: CPT | Performed by: OBSTETRICS & GYNECOLOGY

## 2019-01-04 RX ORDER — TERCONAZOLE 80 MG/1
80 SUPPOSITORY VAGINAL NIGHTLY
Qty: 3 SUPPOSITORY | Refills: 0 | Status: SHIPPED | OUTPATIENT
Start: 2019-01-04 | End: 2019-01-07

## 2019-02-06 ENCOUNTER — ROUTINE PRENATAL (OUTPATIENT)
Dept: OBSTETRICS AND GYNECOLOGY | Facility: CLINIC | Age: 24
End: 2019-02-06

## 2019-02-06 ENCOUNTER — LAB (OUTPATIENT)
Dept: LAB | Facility: HOSPITAL | Age: 24
End: 2019-02-06

## 2019-02-06 VITALS — BODY MASS INDEX: 29.21 KG/M2 | WEIGHT: 181 LBS | DIASTOLIC BLOOD PRESSURE: 72 MMHG | SYSTOLIC BLOOD PRESSURE: 112 MMHG

## 2019-02-06 DIAGNOSIS — Z34.83 PRENATAL CARE, SUBSEQUENT PREGNANCY IN THIRD TRIMESTER: Primary | ICD-10-CM

## 2019-02-06 DIAGNOSIS — Z34.82 PRENATAL CARE, SUBSEQUENT PREGNANCY IN SECOND TRIMESTER: ICD-10-CM

## 2019-02-06 PROBLEM — O99.013 ANEMIA IN PREGNANCY, THIRD TRIMESTER: Status: ACTIVE | Noted: 2019-02-06

## 2019-02-06 LAB
GLUCOSE 1H P 100 G GLC PO SERPL-MCNC: 118 MG/DL (ref 65–140)
HCT VFR BLD AUTO: 30.9 % (ref 34.5–44)
HGB BLD-MCNC: 9.6 G/DL (ref 11.5–15.5)

## 2019-02-06 PROCEDURE — 0502F SUBSEQUENT PRENATAL CARE: CPT | Performed by: OBSTETRICS & GYNECOLOGY

## 2019-02-06 PROCEDURE — 82950 GLUCOSE TEST: CPT

## 2019-02-06 PROCEDURE — 36415 COLL VENOUS BLD VENIPUNCTURE: CPT

## 2019-02-06 PROCEDURE — 85018 HEMOGLOBIN: CPT

## 2019-02-06 PROCEDURE — 85014 HEMATOCRIT: CPT

## 2019-02-06 RX ORDER — FERROUS SULFATE 325(65) MG
325 TABLET ORAL
Qty: 60 TABLET | Refills: 10 | Status: SHIPPED | OUTPATIENT
Start: 2019-02-06 | End: 2019-03-06 | Stop reason: SDUPTHER

## 2019-02-06 NOTE — PROGRESS NOTES
Chief Complaint   Patient presents with   • Routine Prenatal Visit       HPI: Geraldine is a  currently at 29w4d who today reports the following:  Contractions - No; Leaking - No; Vaginal bleeding -  No; Heartburn - No.    ROS:  GI: Nausea - No ; Constipation - No; Diarrhea - No    Neuro: Headache - No ; Visual change - No      EXAM:  Vitals: See prenatal flowsheet   Abdomen: See prenatal flowsheet   Urine glucose/protein: See prenatal flowsheet   Pelvic: See prenatal flowsheet     Lab Results   Component Value Date    ABO A 2018    RH Positive 2018    ABSCRN Negative 2018       MDM:  Impression: 1. Supervision of low risk pregnancy  2. Excess weight gain in pregnancy   Tests done today: 1. none   Topics discussed: 1. Continue with PNV's  2. Prenatal labs reviewed  3. childbirth classes  4. flu vaccine  5. TDAP vaccination   Tests scheduled today for her next visit:   none

## 2019-02-20 ENCOUNTER — ROUTINE PRENATAL (OUTPATIENT)
Dept: OBSTETRICS AND GYNECOLOGY | Facility: CLINIC | Age: 24
End: 2019-02-20

## 2019-02-20 VITALS — BODY MASS INDEX: 29.54 KG/M2 | SYSTOLIC BLOOD PRESSURE: 114 MMHG | WEIGHT: 183 LBS | DIASTOLIC BLOOD PRESSURE: 70 MMHG

## 2019-02-20 DIAGNOSIS — O99.013 ANEMIA IN PREGNANCY, THIRD TRIMESTER: ICD-10-CM

## 2019-02-20 DIAGNOSIS — Z34.83 PRENATAL CARE, SUBSEQUENT PREGNANCY IN THIRD TRIMESTER: ICD-10-CM

## 2019-02-20 PROBLEM — I51.9 HEART DISEASE IN MOTHER AFFECTING PREGNANCY IN THIRD TRIMESTER: Status: ACTIVE | Noted: 2019-02-20

## 2019-02-20 PROBLEM — O99.413 HEART DISEASE IN MOTHER AFFECTING PREGNANCY IN THIRD TRIMESTER: Status: ACTIVE | Noted: 2019-02-20

## 2019-02-20 PROCEDURE — 0502F SUBSEQUENT PRENATAL CARE: CPT | Performed by: OBSTETRICS & GYNECOLOGY

## 2019-02-20 NOTE — PROGRESS NOTES
Chief Complaint   Patient presents with   • Routine Prenatal Visit       HPI: Geraldine is a  currently at 31w4d who today reports the following:  Contractions - No; Leaking - No; Vaginal bleeding -  No; Swelling of extremities - No.    ROS:  GI: Nausea - No; Constipation - No; Diarrhea - No    Neuro: Headache - No; Visual change - No      EXAM:  Vitals: See prenatal flowsheet   Abdomen: See prenatal flowsheet   Urine glucose/protein: See prenatal flowsheet   Pelvic: See prenatal flowsheet   MDM:   Impression: 1. Supervision of high risk pregnancy  2. Anemia in pregnancy  3. GERD in pregnancy   Tests done today: 1. none   Topics discussed: 1. Continue with PNV's  2. Prenatal labs reviewed  3. none - she had no major complaints,questions or concerns  4. OTC medical options for her GERD   Tests scheduled today for her next visit:   none

## 2019-03-06 ENCOUNTER — APPOINTMENT (OUTPATIENT)
Dept: LAB | Facility: HOSPITAL | Age: 24
End: 2019-03-06

## 2019-03-06 ENCOUNTER — ROUTINE PRENATAL (OUTPATIENT)
Dept: OBSTETRICS AND GYNECOLOGY | Facility: CLINIC | Age: 24
End: 2019-03-06

## 2019-03-06 VITALS — DIASTOLIC BLOOD PRESSURE: 70 MMHG | BODY MASS INDEX: 29.7 KG/M2 | SYSTOLIC BLOOD PRESSURE: 116 MMHG | WEIGHT: 184 LBS

## 2019-03-06 DIAGNOSIS — O99.013 ANEMIA IN PREGNANCY, THIRD TRIMESTER: ICD-10-CM

## 2019-03-06 DIAGNOSIS — R12 HEARTBURN DURING PREGNANCY IN THIRD TRIMESTER: ICD-10-CM

## 2019-03-06 DIAGNOSIS — Z34.83 PRENATAL CARE, SUBSEQUENT PREGNANCY IN THIRD TRIMESTER: Primary | ICD-10-CM

## 2019-03-06 DIAGNOSIS — O26.893 HEARTBURN DURING PREGNANCY IN THIRD TRIMESTER: ICD-10-CM

## 2019-03-06 LAB
HCT VFR BLD AUTO: 30.9 % (ref 34.5–44)
HGB BLD-MCNC: 9.4 G/DL (ref 11.5–15.5)

## 2019-03-06 PROCEDURE — 85014 HEMATOCRIT: CPT | Performed by: OBSTETRICS & GYNECOLOGY

## 2019-03-06 PROCEDURE — 36415 COLL VENOUS BLD VENIPUNCTURE: CPT | Performed by: OBSTETRICS & GYNECOLOGY

## 2019-03-06 PROCEDURE — 85018 HEMOGLOBIN: CPT | Performed by: OBSTETRICS & GYNECOLOGY

## 2019-03-06 PROCEDURE — 0502F SUBSEQUENT PRENATAL CARE: CPT | Performed by: OBSTETRICS & GYNECOLOGY

## 2019-03-06 RX ORDER — FERROUS SULFATE 325(65) MG
325 TABLET ORAL
Qty: 90 TABLET | Refills: 10 | Status: SHIPPED | OUTPATIENT
Start: 2019-03-06 | End: 2019-05-23 | Stop reason: SDUPTHER

## 2019-03-06 NOTE — PROGRESS NOTES
Chief Complaint   Patient presents with   • Routine Prenatal Visit       HPI: Geraldine is a  currently at 33w4d who today reports the following:  Contractions - No; Leaking - No; Vaginal bleeding -  No; Swelling of extremities - No.    ROS:  GI: Nausea - No; Constipation - No; Diarrhea - No    Neuro: Headache - No; Visual change - No      EXAM:  Vitals: See prenatal flowsheet   Abdomen: See prenatal flowsheet   Urine glucose/protein: See prenatal flowsheet   Pelvic: See prenatal flowsheet   MDM:   Impression: 1. Supervision of high risk pregnancy  2. Anemia in pregnancy  3. GERD in pregnancy   Tests done today: 1. HgB   Topics discussed: 1. Continue with PNV's  2. Prenatal labs reviewed  3. flu vaccine - declines  4. TDAP vaccination - declines   Tests scheduled today for her next visit:   none

## 2019-03-07 ENCOUNTER — TELEPHONE (OUTPATIENT)
Dept: OBSTETRICS AND GYNECOLOGY | Facility: CLINIC | Age: 24
End: 2019-03-07

## 2019-03-07 NOTE — TELEPHONE ENCOUNTER
Dr Velazquez    Pt returning call and pt also has a question regarding hives on her forearms.    Pt call back [phone]

## 2019-03-08 ENCOUNTER — TELEPHONE (OUTPATIENT)
Dept: OBSTETRICS AND GYNECOLOGY | Facility: CLINIC | Age: 24
End: 2019-03-08

## 2019-03-08 NOTE — TELEPHONE ENCOUNTER
Informed pt that a little brown spotting is not of concern but that if she bagan to have any red spotting to give us a call back. Also told her to refrain from having intercourse. Pt stated understanding.

## 2019-03-21 ENCOUNTER — ROUTINE PRENATAL (OUTPATIENT)
Dept: OBSTETRICS AND GYNECOLOGY | Facility: CLINIC | Age: 24
End: 2019-03-21

## 2019-03-21 VITALS — WEIGHT: 187 LBS | DIASTOLIC BLOOD PRESSURE: 72 MMHG | SYSTOLIC BLOOD PRESSURE: 114 MMHG | BODY MASS INDEX: 30.18 KG/M2

## 2019-03-21 DIAGNOSIS — Z34.83 PRENATAL CARE, SUBSEQUENT PREGNANCY IN THIRD TRIMESTER: Primary | ICD-10-CM

## 2019-03-21 DIAGNOSIS — O99.013 ANEMIA IN PREGNANCY, THIRD TRIMESTER: ICD-10-CM

## 2019-03-21 DIAGNOSIS — R12 HEARTBURN DURING PREGNANCY IN THIRD TRIMESTER: ICD-10-CM

## 2019-03-21 DIAGNOSIS — O26.893 HEARTBURN DURING PREGNANCY IN THIRD TRIMESTER: ICD-10-CM

## 2019-03-21 PROCEDURE — 0502F SUBSEQUENT PRENATAL CARE: CPT | Performed by: OBSTETRICS & GYNECOLOGY

## 2019-03-21 NOTE — PROGRESS NOTES
Chief Complaint   Patient presents with   • Routine Prenatal Visit       HPI: Geraldine is a  currently at 35w5d who today reports the following:  Contractions - No; Leaking - No; Vaginal bleeding -  No; Swelling of extremities - No.    ROS:  GI: Nausea - No; Constipation - No; Diarrhea - No    Neuro: Headache - No; Visual change - No      EXAM:  Vitals: See prenatal flowsheet   Abdomen: See prenatal flowsheet   Urine glucose/protein: See prenatal flowsheet   Pelvic: See prenatal flowsheet   MDM:   Impression: 1. Supervision of high risk pregnancy  2. Anemia in pregnancy   Tests done today: 1. GBS testing   Topics discussed: 1. Continue with PNV's  2. Prenatal labs reviewed  3. none - she had no major complaints,questions or concerns   Tests scheduled today for her next visit:   none

## 2019-03-28 ENCOUNTER — ROUTINE PRENATAL (OUTPATIENT)
Dept: OBSTETRICS AND GYNECOLOGY | Facility: CLINIC | Age: 24
End: 2019-03-28

## 2019-03-28 VITALS — SYSTOLIC BLOOD PRESSURE: 108 MMHG | DIASTOLIC BLOOD PRESSURE: 66 MMHG | WEIGHT: 189 LBS | BODY MASS INDEX: 30.51 KG/M2

## 2019-03-28 DIAGNOSIS — O99.013 ANEMIA IN PREGNANCY, THIRD TRIMESTER: ICD-10-CM

## 2019-03-28 DIAGNOSIS — O26.893 HEARTBURN DURING PREGNANCY IN THIRD TRIMESTER: ICD-10-CM

## 2019-03-28 DIAGNOSIS — R12 HEARTBURN DURING PREGNANCY IN THIRD TRIMESTER: ICD-10-CM

## 2019-03-28 DIAGNOSIS — Z34.83 PRENATAL CARE, SUBSEQUENT PREGNANCY IN THIRD TRIMESTER: Primary | ICD-10-CM

## 2019-03-28 LAB — GP B STREP RRNA SPEC QL PROBE: NEGATIVE

## 2019-03-28 PROCEDURE — 0502F SUBSEQUENT PRENATAL CARE: CPT | Performed by: OBSTETRICS & GYNECOLOGY

## 2019-03-28 NOTE — PROGRESS NOTES
Chief Complaint   Patient presents with   • Routine Prenatal Visit     ob visit       HPI: Geraldine is a  currently at 36w5d who today reports the following:  Contractions - No; Leaking - No; Vaginal bleeding -  No; Swelling of extremities - No.    ROS:  GI: Nausea - No; Constipation - No; Diarrhea - No    Neuro: Headache - No; Visual change - No      EXAM:  Vitals: See prenatal flowsheet   Abdomen: See prenatal flowsheet   Urine glucose/protein: See prenatal flowsheet   Pelvic: See prenatal flowsheet   MDM:   Impression: 1. Supervision of high risk pregnancy  2. Anemia in pregnancy   Tests done today: 1. none   Topics discussed: 1. Continue with PNV's  2. Prenatal labs reviewed  3. none - she had no major complaints,questions or concerns   Tests scheduled today for her next visit:   none

## 2019-04-04 ENCOUNTER — ROUTINE PRENATAL (OUTPATIENT)
Dept: OBSTETRICS AND GYNECOLOGY | Facility: CLINIC | Age: 24
End: 2019-04-04

## 2019-04-04 VITALS — BODY MASS INDEX: 30.83 KG/M2 | DIASTOLIC BLOOD PRESSURE: 72 MMHG | WEIGHT: 191 LBS | SYSTOLIC BLOOD PRESSURE: 112 MMHG

## 2019-04-04 DIAGNOSIS — Z34.83 PRENATAL CARE, SUBSEQUENT PREGNANCY IN THIRD TRIMESTER: Primary | ICD-10-CM

## 2019-04-04 DIAGNOSIS — R12 HEARTBURN DURING PREGNANCY IN THIRD TRIMESTER: ICD-10-CM

## 2019-04-04 DIAGNOSIS — O26.893 HEARTBURN DURING PREGNANCY IN THIRD TRIMESTER: ICD-10-CM

## 2019-04-04 DIAGNOSIS — O99.013 ANEMIA IN PREGNANCY, THIRD TRIMESTER: ICD-10-CM

## 2019-04-04 PROCEDURE — 59426 ANTEPARTUM CARE ONLY: CPT | Performed by: OBSTETRICS & GYNECOLOGY

## 2019-04-04 NOTE — PROGRESS NOTES
Chief Complaint   Patient presents with   • Routine Prenatal Visit       HPI: Geraldine is a  currently at 37w5d who today reports the following:  Contractions - YES - but less than 4/hour AND no associated change in vaginal discharge; Leaking - No; Vaginal bleeding -  No; Swelling of extremities - No.    ROS:  GI: Nausea - No; Constipation - No; Diarrhea - No    Neuro: Headache - No; Visual change - No      EXAM:  Vitals: See prenatal flowsheet   Abdomen: See prenatal flowsheet   Urine glucose/protein: See prenatal flowsheet   Pelvic: See prenatal flowsheet   MDM:   Impression: 1. Supervision of high risk pregnancy  2. Anemia in pregnancy   Tests done today: 1. none   Topics discussed: 1. Continue with PNV's  2. Prenatal labs reviewed  3. none - she had no major complaints,questions or concerns   Tests scheduled today for her next visit:   none

## 2019-04-06 ENCOUNTER — HOSPITAL ENCOUNTER (INPATIENT)
Facility: HOSPITAL | Age: 24
LOS: 2 days | Discharge: HOME | End: 2019-04-08
Attending: OBSTETRICS & GYNECOLOGY | Admitting: OBSTETRICS & GYNECOLOGY

## 2019-04-06 PROBLEM — Z3A.38 38 WEEKS GESTATION OF PREGNANCY: Status: ACTIVE | Noted: 2019-04-06

## 2019-04-06 PROBLEM — O26.893 HEARTBURN DURING PREGNANCY IN THIRD TRIMESTER: Status: RESOLVED | Noted: 2019-02-20 | Resolved: 2019-04-06

## 2019-04-06 PROBLEM — Z37.9 NORMAL LABOR: Status: ACTIVE | Noted: 2019-04-06

## 2019-04-06 PROBLEM — R12 HEARTBURN DURING PREGNANCY IN THIRD TRIMESTER: Status: RESOLVED | Noted: 2019-02-20 | Resolved: 2019-04-06

## 2019-04-06 PROBLEM — Z34.83 PRENATAL CARE, SUBSEQUENT PREGNANCY IN THIRD TRIMESTER: Status: RESOLVED | Noted: 2018-09-07 | Resolved: 2019-04-06

## 2019-04-06 PROBLEM — O99.013 ANEMIA IN PREGNANCY, THIRD TRIMESTER: Status: RESOLVED | Noted: 2019-02-06 | Resolved: 2019-04-06

## 2019-04-06 PROBLEM — Z3A.38 38 WEEKS GESTATION OF PREGNANCY: Status: RESOLVED | Noted: 2019-04-06 | Resolved: 2019-04-06

## 2019-04-06 LAB
ABO GROUP BLD: NORMAL
ALP SERPL-CCNC: 130 U/L (ref 25–100)
ALT SERPL W P-5'-P-CCNC: 11 U/L (ref 7–40)
AST SERPL-CCNC: 15 U/L (ref 0–33)
BILIRUB SERPL-MCNC: 0.3 MG/DL (ref 0.3–1.2)
BLD GP AB SCN SERPL QL: NEGATIVE
CREAT BLD-MCNC: 0.52 MG/DL (ref 0.6–1.3)
DEPRECATED RDW RBC AUTO: 43.2 FL (ref 37–54)
ERYTHROCYTE [DISTWIDTH] IN BLOOD BY AUTOMATED COUNT: 15.7 % (ref 11.3–14.5)
HCT VFR BLD AUTO: 30.3 % (ref 34.5–44)
HGB BLD-MCNC: 9 G/DL (ref 11.5–15.5)
LDH SERPL-CCNC: 177 U/L (ref 120–246)
MCH RBC QN AUTO: 22.3 PG (ref 27–31)
MCHC RBC AUTO-ENTMCNC: 29.7 G/DL (ref 32–36)
MCV RBC AUTO: 75 FL (ref 80–99)
PLATELET # BLD AUTO: 169 10*3/MM3 (ref 150–450)
PMV BLD AUTO: 11.3 FL (ref 6–12)
RBC # BLD AUTO: 4.04 10*6/MM3 (ref 3.89–5.14)
RH BLD: POSITIVE
T&S EXPIRATION DATE: NORMAL
URATE SERPL-MCNC: 4.4 MG/DL (ref 3.1–7.8)
WBC NRBC COR # BLD: 7.1 10*3/MM3 (ref 3.5–10.8)

## 2019-04-06 PROCEDURE — 59410 OBSTETRICAL CARE: CPT | Performed by: OBSTETRICS & GYNECOLOGY

## 2019-04-06 PROCEDURE — 59025 FETAL NON-STRESS TEST: CPT

## 2019-04-06 PROCEDURE — 82247 BILIRUBIN TOTAL: CPT | Performed by: OBSTETRICS & GYNECOLOGY

## 2019-04-06 PROCEDURE — 86900 BLOOD TYPING SEROLOGIC ABO: CPT | Performed by: OBSTETRICS & GYNECOLOGY

## 2019-04-06 PROCEDURE — 84460 ALANINE AMINO (ALT) (SGPT): CPT | Performed by: OBSTETRICS & GYNECOLOGY

## 2019-04-06 PROCEDURE — 84450 TRANSFERASE (AST) (SGOT): CPT | Performed by: OBSTETRICS & GYNECOLOGY

## 2019-04-06 PROCEDURE — 82565 ASSAY OF CREATININE: CPT | Performed by: OBSTETRICS & GYNECOLOGY

## 2019-04-06 PROCEDURE — 84550 ASSAY OF BLOOD/URIC ACID: CPT | Performed by: OBSTETRICS & GYNECOLOGY

## 2019-04-06 PROCEDURE — 25010000002 BUTORPHANOL PER 1 MG: Performed by: OBSTETRICS & GYNECOLOGY

## 2019-04-06 PROCEDURE — 83615 LACTATE (LD) (LDH) ENZYME: CPT | Performed by: OBSTETRICS & GYNECOLOGY

## 2019-04-06 PROCEDURE — 86850 RBC ANTIBODY SCREEN: CPT | Performed by: OBSTETRICS & GYNECOLOGY

## 2019-04-06 PROCEDURE — 85027 COMPLETE CBC AUTOMATED: CPT | Performed by: OBSTETRICS & GYNECOLOGY

## 2019-04-06 PROCEDURE — 84075 ASSAY ALKALINE PHOSPHATASE: CPT | Performed by: OBSTETRICS & GYNECOLOGY

## 2019-04-06 PROCEDURE — 86901 BLOOD TYPING SEROLOGIC RH(D): CPT | Performed by: OBSTETRICS & GYNECOLOGY

## 2019-04-06 RX ORDER — SODIUM CHLORIDE, SODIUM LACTATE, POTASSIUM CHLORIDE, CALCIUM CHLORIDE 600; 310; 30; 20 MG/100ML; MG/100ML; MG/100ML; MG/100ML
125 INJECTION, SOLUTION INTRAVENOUS CONTINUOUS
Status: DISCONTINUED | OUTPATIENT
Start: 2019-04-06 | End: 2019-04-07

## 2019-04-06 RX ORDER — MISOPROSTOL 200 UG/1
800 TABLET ORAL AS NEEDED
Status: DISCONTINUED | OUTPATIENT
Start: 2019-04-06 | End: 2019-04-06 | Stop reason: HOSPADM

## 2019-04-06 RX ORDER — SODIUM CHLORIDE 0.9 % (FLUSH) 0.9 %
3 SYRINGE (ML) INJECTION EVERY 12 HOURS SCHEDULED
Status: DISCONTINUED | OUTPATIENT
Start: 2019-04-06 | End: 2019-04-06

## 2019-04-06 RX ORDER — MAGNESIUM CARB/ALUMINUM HYDROX 105-160MG
30 TABLET,CHEWABLE ORAL ONCE
Status: DISCONTINUED | OUTPATIENT
Start: 2019-04-06 | End: 2019-04-06 | Stop reason: HOSPADM

## 2019-04-06 RX ORDER — METHYLERGONOVINE MALEATE 0.2 MG/ML
200 INJECTION INTRAVENOUS ONCE AS NEEDED
Status: DISCONTINUED | OUTPATIENT
Start: 2019-04-06 | End: 2019-04-06 | Stop reason: HOSPADM

## 2019-04-06 RX ORDER — OXYTOCIN-SODIUM CHLORIDE 0.9% IV SOLN 30 UNIT/500ML 30-0.9/5 UT/ML-%
650 SOLUTION INTRAVENOUS ONCE
Status: DISCONTINUED | OUTPATIENT
Start: 2019-04-06 | End: 2019-04-06 | Stop reason: HOSPADM

## 2019-04-06 RX ORDER — SODIUM CHLORIDE 0.9 % (FLUSH) 0.9 %
1-10 SYRINGE (ML) INJECTION AS NEEDED
Status: DISCONTINUED | OUTPATIENT
Start: 2019-04-06 | End: 2019-04-08 | Stop reason: HOSPADM

## 2019-04-06 RX ORDER — OXYCODONE HYDROCHLORIDE AND ACETAMINOPHEN 5; 325 MG/1; MG/1
1 TABLET ORAL EVERY 4 HOURS PRN
Status: DISCONTINUED | OUTPATIENT
Start: 2019-04-06 | End: 2019-04-08 | Stop reason: HOSPADM

## 2019-04-06 RX ORDER — LIDOCAINE HYDROCHLORIDE 10 MG/ML
5 INJECTION, SOLUTION EPIDURAL; INFILTRATION; INTRACAUDAL; PERINEURAL AS NEEDED
Status: DISCONTINUED | OUTPATIENT
Start: 2019-04-06 | End: 2019-04-06

## 2019-04-06 RX ORDER — CARBOPROST TROMETHAMINE 250 UG/ML
250 INJECTION, SOLUTION INTRAMUSCULAR AS NEEDED
Status: DISCONTINUED | OUTPATIENT
Start: 2019-04-06 | End: 2019-04-06 | Stop reason: HOSPADM

## 2019-04-06 RX ORDER — IBUPROFEN 600 MG/1
600 TABLET ORAL EVERY 6 HOURS PRN
Status: DISCONTINUED | OUTPATIENT
Start: 2019-04-06 | End: 2019-04-08 | Stop reason: HOSPADM

## 2019-04-06 RX ORDER — SODIUM CHLORIDE, SODIUM LACTATE, POTASSIUM CHLORIDE, CALCIUM CHLORIDE 600; 310; 30; 20 MG/100ML; MG/100ML; MG/100ML; MG/100ML
125 INJECTION, SOLUTION INTRAVENOUS CONTINUOUS
Status: DISCONTINUED | OUTPATIENT
Start: 2019-04-06 | End: 2019-04-06

## 2019-04-06 RX ORDER — ZOLPIDEM TARTRATE 5 MG/1
5 TABLET ORAL NIGHTLY PRN
Status: DISCONTINUED | OUTPATIENT
Start: 2019-04-06 | End: 2019-04-08 | Stop reason: HOSPADM

## 2019-04-06 RX ORDER — OXYTOCIN-SODIUM CHLORIDE 0.9% IV SOLN 30 UNIT/500ML 30-0.9/5 UT/ML-%
85 SOLUTION INTRAVENOUS ONCE
Status: DISCONTINUED | OUTPATIENT
Start: 2019-04-06 | End: 2019-04-06 | Stop reason: HOSPADM

## 2019-04-06 RX ORDER — IBUPROFEN 600 MG/1
600 TABLET ORAL EVERY 6 HOURS PRN
Status: DISCONTINUED | OUTPATIENT
Start: 2019-04-06 | End: 2019-04-06 | Stop reason: HOSPADM

## 2019-04-06 RX ORDER — BISACODYL 10 MG
10 SUPPOSITORY, RECTAL RECTAL DAILY PRN
Status: DISCONTINUED | OUTPATIENT
Start: 2019-04-07 | End: 2019-04-08 | Stop reason: HOSPADM

## 2019-04-06 RX ORDER — BUTORPHANOL TARTRATE 1 MG/ML
1 INJECTION, SOLUTION INTRAMUSCULAR; INTRAVENOUS
Status: DISCONTINUED | OUTPATIENT
Start: 2019-04-06 | End: 2019-04-06

## 2019-04-06 RX ORDER — OXYTOCIN-SODIUM CHLORIDE 0.9% IV SOLN 30 UNIT/500ML 30-0.9/5 UT/ML-%
2-24 SOLUTION INTRAVENOUS
Status: DISCONTINUED | OUTPATIENT
Start: 2019-04-06 | End: 2019-04-06

## 2019-04-06 RX ORDER — MAGNESIUM CARB/ALUMINUM HYDROX 105-160MG
30 TABLET,CHEWABLE ORAL ONCE
Status: DISCONTINUED | OUTPATIENT
Start: 2019-04-06 | End: 2019-04-06

## 2019-04-06 RX ORDER — SODIUM CHLORIDE 0.9 % (FLUSH) 0.9 %
1-10 SYRINGE (ML) INJECTION AS NEEDED
Status: DISCONTINUED | OUTPATIENT
Start: 2019-04-06 | End: 2019-04-06

## 2019-04-06 RX ADMIN — OXYTOCIN 2 MILLI-UNITS/MIN: 10 INJECTION INTRAVENOUS at 17:40

## 2019-04-06 RX ADMIN — SODIUM CHLORIDE, POTASSIUM CHLORIDE, SODIUM LACTATE AND CALCIUM CHLORIDE 125 ML/HR: 600; 310; 30; 20 INJECTION, SOLUTION INTRAVENOUS at 16:00

## 2019-04-06 RX ADMIN — BUTORPHANOL TARTRATE 2 MG: 2 INJECTION, SOLUTION INTRAMUSCULAR; INTRAVENOUS at 19:16

## 2019-04-06 NOTE — H&P
" Michel  Geraldine Enrique  : 1995  MRN: [id number]  CSN: [id number]    History and Physical    Subjective   Chief Complaint   Patient presents with   • Rupture of Membranes     Geraldine Enrique is a 23 y.o. year old  with an Estimated Date of Delivery: 19 currently at 38w0d presenting with ROM.  She also reports irregular contractions.  On admission cervix was ~ 3 cm dilated.  She was started on pitocin due to inadequate contraction frequency.    Prenatal care has been with Dr. Velazquez.  It has been benign.    Obstetric History       T1      L1     SAB0   TAB0   Ectopic0   Molar0   Multiple0   Live Births1       # Outcome Date GA Lbr Britton/2nd Weight Sex Delivery Anes PTL Lv   2 Current            1 Term 17 39w3d 18:45 / 00:17 4300 g (9 lb 7.7 oz) M Vag-Spont None N ANIKA      Name: Chase      Complications: Fetal macrosomia      Apgar1:  8                Apgar5: 9      Obstetric Comments   2017 - Chase     Past Medical History:   Diagnosis Date   • Scoliosis 2006    surgically corrected at age 13-13 yo   • Kidney stone complicating pregnancy 2016     Past Surgical History:   Procedure Laterality Date   • TONSILLECTOMY AND ADENOIDECTOMY     • CERVICAL CHIARI DECOMPRESSION POSTERIOR     • SPINAL GROWTH RODS  10/2007    scoliosis       Allergies   Allergen Reactions   • Morphine And Related Hives     Social History    Tobacco Use      Smoking status: Never Smoker      Smokeless tobacco: Never Used    Review of Systems      Objective   /69   Pulse 94   Temp 98.6 °F (37 °C) (Oral)   Resp 16   Ht 167.6 cm (66\")   Wt 86.2 kg (190 lb)   LMP 2018 (Approximate)   Breastfeeding? Yes   BMI 30.67 kg/m²   General: well developed; well nourished  no acute distress   Heart: Not performed.   Lungs: breathing is unlabored   Abdomen: soft, non-tender; no masses  no umbilical or inguinal hernias are present  no hepato-splenomegaly   FHT's: " reassuring and category 1   Cervix: was not checked.   Presentation: cephalic   Contractions: regular     Prenatal Labs  Lab Results   Component Value Date    HGB 9.0 (L) 04/06/2019    RUBELLAABIGG 148.4 09/13/2018    RUBELLAABIGG Immune 09/13/2018    HEPBSAG Non-Reactive 09/13/2018    ABSCRN Negative 04/06/2019    MZU9QEG9 Non-Reactive 09/13/2018    HEPCVIRUSABY Non-Reactive 09/13/2018     02/06/2019    STREPGPB negative 03/28/2019    URINECX <10,000 CFU/mL Normal Urogenital Mendy 09/13/2018    CHLAMNAA negative 10/04/2018    NGONORRHON negative 10/04/2018       Current Labs Reviewed   No data reviewed       Assessment   1. IUP at 38w0d with ROM  2. Group B strep status: negative     Plan   1. Continue with augmentation    Louie Velazquez MD  4/6/2019  6:56 PM

## 2019-04-07 LAB
HCT VFR BLD AUTO: 26.3 % (ref 34.5–44)
HGB BLD-MCNC: 8 G/DL (ref 11.5–15.5)

## 2019-04-07 PROCEDURE — 85014 HEMATOCRIT: CPT | Performed by: OBSTETRICS & GYNECOLOGY

## 2019-04-07 PROCEDURE — 85018 HEMOGLOBIN: CPT | Performed by: OBSTETRICS & GYNECOLOGY

## 2019-04-07 RX ORDER — LANOLIN 100 %
OINTMENT (GRAM) TOPICAL CONTINUOUS PRN
Status: DISCONTINUED | OUTPATIENT
Start: 2019-04-07 | End: 2019-04-08 | Stop reason: HOSPADM

## 2019-04-07 RX ORDER — DOCUSATE SODIUM 100 MG/1
100 CAPSULE, LIQUID FILLED ORAL 2 TIMES DAILY
Status: DISCONTINUED | OUTPATIENT
Start: 2019-04-07 | End: 2019-04-08 | Stop reason: HOSPADM

## 2019-04-07 RX ADMIN — OXYCODONE AND ACETAMINOPHEN 1 TABLET: 5; 325 TABLET ORAL at 05:08

## 2019-04-07 RX ADMIN — IBUPROFEN 600 MG: 600 TABLET, FILM COATED ORAL at 02:53

## 2019-04-07 RX ADMIN — OXYCODONE AND ACETAMINOPHEN 1 TABLET: 5; 325 TABLET ORAL at 00:11

## 2019-04-07 RX ADMIN — WITCH HAZEL 1 PAD: 500 SOLUTION RECTAL; TOPICAL at 02:53

## 2019-04-07 RX ADMIN — Medication: at 00:12

## 2019-04-07 RX ADMIN — DOCUSATE SODIUM 100 MG: 100 CAPSULE, LIQUID FILLED ORAL at 10:54

## 2019-04-07 RX ADMIN — POLYETHYLENE GLYCOL 3350 17 G: 17 POWDER, FOR SOLUTION ORAL at 10:58

## 2019-04-07 RX ADMIN — HYDROCORTISONE 2.5% 1 APPLICATION: 25 CREAM TOPICAL at 02:53

## 2019-04-07 RX ADMIN — IBUPROFEN 600 MG: 600 TABLET, FILM COATED ORAL at 16:33

## 2019-04-07 RX ADMIN — Medication: at 16:41

## 2019-04-07 RX ADMIN — IBUPROFEN 600 MG: 600 TABLET, FILM COATED ORAL at 10:34

## 2019-04-07 RX ADMIN — DOCUSATE SODIUM 100 MG: 100 CAPSULE, LIQUID FILLED ORAL at 21:57

## 2019-04-07 NOTE — PROGRESS NOTES
[facility] [place]  Geraldine Enrique  : 1995  MRN: [id number]  CSN: [id number]    Hospital Day: 2    Postpartum Day #1  Subjective   Her pain is well controlled.  Vaginal bleeding is less than a normal period.       Objective     Min/max vitals past 24 hours:   Temp  Min: 97.7 °F (36.5 °C)  Max: 98.6 °F (37 °C)  BP  Min: 99/60  Max: 138/66  Pulse  Min: 62  Max: 114  Resp  Min: 16  Max: 18        Abdomen: soft, non-tender; bowel sounds normal; no masses   fundus firm and non-tender   Pelvic: deferred     Results from last 7 days   Lab Units 19  0612 19  1603   WBC 10*3/mm3  --  7.10   HEMOGLOBIN g/dL 8.0* 9.0*   HEMATOCRIT % 26.3* 30.3*   PLATELETS 10*3/mm3  --  169     Lab Results   Component Value Date    RH Positive 2019    HEPBSAG Non-Reactive 2018        Assessment   1. Postpartum Day #1 S/P vaginal delivery  2. Postpartum anemia     Plan   1. Continue routine postpartum care    Louie Velazquez MD  2019  8:29 AM

## 2019-04-07 NOTE — PLAN OF CARE
Problem: Postpartum (Vaginal Delivery) (Adult,Obstetrics,Pediatric)  Goal: Signs and Symptoms of Listed Potential Problems Will be Absent, Minimized or Managed (Postpartum)  Outcome: Ongoing (interventions implemented as appropriate)   04/07/19 0129   Goal/Outcome Evaluation   Problems Assessed (Postpartum Vaginal Delivery) all   Problems Present (Postpartum Vag Deliv) none

## 2019-04-07 NOTE — L&D DELIVERY NOTE
[facility]  Vaginal Delivery Note    Delivery details     Delivery: Vaginal, Spontaneous     YOB: 2021    Time of Birth: 9:48 PM      Anesthesia: Local     Delivering clinician: Louie Velazquez    Forceps?   No   Vacuum? No    Shoulder dystocia present: No      Delivery narrative: Lidocaine infiltrated the perineum.  Second-degree midline episiotomy created.  Spontaneous delivery from occiput anterior.  Left shoulder initially anterior.  Total sign present.  Head of bed made flat and Yonatan maneuver performed in a clockwise fashion.  Shoulders delivered uneventfully without difficulty at this point.  Mouth and nose were suctioned on perineum and again after delivery.  After 30 seconds of delayed cord clamping infant placed on maternal abdomen.  Placenta then delivered spontaneously and intact with normal three-vessel cord.  Repair of second-degree midline episiotomy without extensions using 3-0 chromic suture.    Infant details    Findings: male  infant     Infant observations: Weight: 3810 g (8 lb 6.4 oz)   Length: 20  in   Apgars: 8   @ 1 minute /    9   @ 5 minutes     Placenta, Cord, and Fluid    Placenta delivered  Spontaneous  at   4/6/2019  9:52 PM     Cord: 3 vessels  present.   Nuchal Cord?  no   Cord blood obtained: Yes      Repair    Episiotomy: Median    Lacerations: No   Estimated Blood Loss: Est. Blood Loss (mL): 250 mL(Filed from Delivery Summary) (04/06/19 6203)     Complications  none    Disposition  Mother to Mother Baby/Postpartum  in stable condition currently.  Baby to NBN  in stable condition currently.      Louie Velazquez MD  04/06/19  10:07 PM

## 2019-04-07 NOTE — PLAN OF CARE
Problem: Patient Care Overview  Goal: Plan of Care Review  Outcome: Ongoing (interventions implemented as appropriate)   04/07/19 0129   Coping/Psychosocial   Plan of Care Reviewed With patient;sibling   Plan of Care Review   Progress improving   OTHER   Outcome Summary nmormal immed PP transition      Goal: Individualization and Mutuality  Outcome: Ongoing (interventions implemented as appropriate)   04/07/19 0129   Individualization   Patient Specific Preferences breastfeeding    Patient Specific Goals (Include Timeframe) dc 2 days    Patient Specific Interventions rooming in    Mutuality/Individual Preferences   What Anxieties, Fears, Concerns, or Questions Do You Have About Your Care? none   What Information Would Help Us Give You More Personalized Care? 2nd baby   How Would You and/or Your Support Person Like to Participate in Your Care? educate/ encourage    Mutuality/Individual Preferences   How to Address Anxieties/Fears none     Goal: Discharge Needs Assessment  Outcome: Ongoing (interventions implemented as appropriate)   04/07/19 0129   Discharge Needs Assessment   Readmission Within the Last 30 Days no previous admission in last 30 days   Concerns to be Addressed no discharge needs identified   Patient/Family Anticipates Transition to home;home with family   Patient/Family Anticipated Services at Transition none   Transportation Concerns car, none   Transportation Anticipated car, drives self;family or friend will provide   Anticipated Changes Related to Illness none   Equipment Needed After Discharge none   Discharge Coordination/Progress plan dc 2 days    Disability   Equipment Currently Used at Home none     Goal: Interprofessional Rounds/Family Conf  Outcome: Ongoing (interventions implemented as appropriate)   04/07/19 0129   Interdisciplinary Rounds/Family Conf   Participants nursing;patient

## 2019-04-07 NOTE — LACTATION NOTE
04/07/19 1730   Maternal Information   Date of Referral 04/07/19   Person Making Referral (courtesy consult)   Equipment Type   Breast Pump Type double electric, personal  (at home)   Reproductive Interventions   Breastfeeding Assistance support offered   Breastfeeding Support diary/feeding log utilized;encouragement provided;lactation counseling provided   Mom states breastfeeding is going well. She  her first baby for 20 months and. Teaching done, as documented under Education. To call lactation services, if there are questions or concerns.

## 2019-04-08 VITALS
BODY MASS INDEX: 30.53 KG/M2 | TEMPERATURE: 97.8 F | WEIGHT: 190 LBS | HEIGHT: 66 IN | RESPIRATION RATE: 16 BRPM | HEART RATE: 58 BPM | SYSTOLIC BLOOD PRESSURE: 106 MMHG | DIASTOLIC BLOOD PRESSURE: 55 MMHG

## 2019-04-08 RX ORDER — IBUPROFEN 200 MG
200-600 TABLET ORAL EVERY 6 HOURS PRN
Start: 2019-04-08 | End: 2019-05-23

## 2019-04-08 RX ORDER — OXYCODONE HYDROCHLORIDE AND ACETAMINOPHEN 5; 325 MG/1; MG/1
1 TABLET ORAL EVERY 4 HOURS PRN
Qty: 16 TABLET | Refills: 0 | Status: SHIPPED | OUTPATIENT
Start: 2019-04-08 | End: 2019-05-23

## 2019-04-08 RX ADMIN — DOCUSATE SODIUM 100 MG: 100 CAPSULE, LIQUID FILLED ORAL at 08:32

## 2019-04-08 RX ADMIN — WITCH HAZEL 1 PAD: 500 SOLUTION RECTAL; TOPICAL at 08:32

## 2019-04-08 RX ADMIN — IBUPROFEN 600 MG: 600 TABLET, FILM COATED ORAL at 00:09

## 2019-04-08 RX ADMIN — OXYCODONE AND ACETAMINOPHEN 1 TABLET: 5; 325 TABLET ORAL at 00:09

## 2019-04-08 RX ADMIN — OXYCODONE AND ACETAMINOPHEN 1 TABLET: 5; 325 TABLET ORAL at 04:33

## 2019-04-08 NOTE — PROGRESS NOTES
[facility] [place]  Geraldine Enrique  : 1995  MRN: [id number]  CSN: [id number]    Hospital Day: 3    Postpartum Day #2  Subjective   Her pain is well controlled.  Vaginal bleeding is less than a normal period.      Objective     Min/max vitals past 24 hours:   Temp  Min: 97.9 °F (36.6 °C)  Max: 98 °F (36.7 °C)  BP  Min: 103/53  Max: 121/70  Pulse  Min: 62  Max: 82  Resp  Min: 16  Max: 16        General: well developed; well nourished  no acute distress   Abdomen: fundus firm and non-tender   Pelvic: Not performed   Ext: Calves NT     Results from last 7 days   Lab Units 19  0612 19  1603   WBC 10*3/mm3  --  7.10   HEMOGLOBIN g/dL 8.0* 9.0*   HEMATOCRIT % 26.3* 30.3*   PLATELETS 10*3/mm3  --  169     Lab Results   Component Value Date    RH Positive 2019    HEPBSAG Non-Reactive 2018        Assessment   1. Postpartum Day #2 S/P vaginal delivery  2. Postpartum anemia     Plan   1. Discharge to home  2. Advised no tampons or intercourse for 6 weeks.  3. D/C questions all answered  4. Follow-up appointment in 6 week(s)  5. Fe x 3 months    Louie Velazquez MD  2019  6:26 AM

## 2019-04-08 NOTE — PLAN OF CARE
Problem: Patient Care Overview  Goal: Plan of Care Review  Outcome: Ongoing (interventions implemented as appropriate)  Pt is keeping pain under control w/ meds.  Light lochia and VSS.  Breastfeeding well.     04/08/19 0223   Coping/Psychosocial   Plan of Care Reviewed With patient   Plan of Care Review   Progress improving     Goal: Individualization and Mutuality  Outcome: Ongoing (interventions implemented as appropriate)    Goal: Discharge Needs Assessment  Outcome: Ongoing (interventions implemented as appropriate)    Goal: Interprofessional Rounds/Family Conf  Outcome: Ongoing (interventions implemented as appropriate)      Problem: Postpartum (Vaginal Delivery) (Adult,Obstetrics,Pediatric)  Goal: Signs and Symptoms of Listed Potential Problems Will be Absent, Minimized or Managed (Postpartum)  Outcome: Ongoing (interventions implemented as appropriate)      Problem: Breastfeeding (Adult,Obstetrics,Pediatric)  Goal: Signs and Symptoms of Listed Potential Problems Will be Absent, Minimized or Managed (Breastfeeding)  Outcome: Ongoing (interventions implemented as appropriate)

## 2019-04-08 NOTE — PLAN OF CARE
Problem: Patient Care Overview  Goal: Plan of Care Review  Outcome: Ongoing (interventions implemented as appropriate)   04/08/19 0804   Coping/Psychosocial   Plan of Care Reviewed With patient   Plan of Care Review   Progress improving   OTHER   Outcome Summary u/1 small lochia     Goal: Individualization and Mutuality  Outcome: Ongoing (interventions implemented as appropriate)    Goal: Discharge Needs Assessment  Outcome: Ongoing (interventions implemented as appropriate)    Goal: Interprofessional Rounds/Family Conf  Outcome: Ongoing (interventions implemented as appropriate)      Problem: Postpartum (Vaginal Delivery) (Adult,Obstetrics,Pediatric)  Goal: Signs and Symptoms of Listed Potential Problems Will be Absent, Minimized or Managed (Postpartum)  Outcome: Ongoing (interventions implemented as appropriate)      Problem: Breastfeeding (Adult,Obstetrics,Pediatric)  Goal: Signs and Symptoms of Listed Potential Problems Will be Absent, Minimized or Managed (Breastfeeding)  Outcome: Ongoing (interventions implemented as appropriate)

## 2019-04-08 NOTE — DISCHARGE SUMMARY
Discharge Summary     Michel Enrique  : 1995  MRN: [id number]  [facility]: [id number]    Date of Admission: 2019   Date of Discharge:  2019   Delivering Physician: Louie Velazquez        Admission Diagnosis: 1. Normal labor [O80, Z37.9]   Discharge Diagnosis: 1. Same as above plus  2. Pregnancy at 38w0d - delivered  3. Postpartum anemia       Procedures: 2019  - Vaginal, Spontaneous       Hospital Course  Patient is a 23 y.o.  who at 38w0d had a vaginal birth.  Her postpartum course was without complications.  On PPD #2 she was ready for discharge.  She had normal lochia and pain well controlled with oral medications.    Infant  male  fetus weighing 3810 g (8 lb 6.4 oz)   Apgars -  8  @ 1 minute /  9  @ 5 minutes.    Discharge labs  Lab Results   Component Value Date    WBC 7.10 2019    HGB 8.0 (L) 2019    HCT 26.3 (L) 2019     2019       Discharge Medications     Discharge Medications      New Medications      Instructions Start Date   ibuprofen 200 MG tablet  Commonly known as:  ADVIL,MOTRIN   200-600 mg, Oral, Every 6 Hours PRN      oxyCODONE-acetaminophen 5-325 MG per tablet  Commonly known as:  PERCOCET   1 tablet, Oral, Every 4 Hours PRN         Continue These Medications      Instructions Start Date   ferrous sulfate 325 (65 FE) MG tablet   325 mg, Oral, 3 Times Daily Before Meals             Discharge Disposition Home or Self Care   Condition on Discharge: good   Follow-up: 6 weeks with Dr. Caroline Velazquez MD  2019

## 2019-04-10 ENCOUNTER — TELEPHONE (OUTPATIENT)
Dept: OBSTETRICS AND GYNECOLOGY | Facility: CLINIC | Age: 24
End: 2019-04-10

## 2019-04-10 NOTE — TELEPHONE ENCOUNTER
Spoke with pt she c/o's discomfort from hemorrhoids. Has been using witch hazel pads and procto-foam since delivery but has noticed relief is short term.  Advised pt to use Preparation H and continue using tuck's pads as needed as well as using stool softeners and avoiding straining. Pt verbalizes understanding.

## 2019-04-10 NOTE — TELEPHONE ENCOUNTER
Dr Ndiaye    Pt calling and just delivered Saturday and is having trouble with hemorrhoids.    Pt call back [phone]

## 2019-04-17 ENCOUNTER — APPOINTMENT (OUTPATIENT)
Dept: LABOR AND DELIVERY | Facility: HOSPITAL | Age: 24
End: 2019-04-17

## 2019-05-23 ENCOUNTER — POSTPARTUM VISIT (OUTPATIENT)
Dept: OBSTETRICS AND GYNECOLOGY | Facility: CLINIC | Age: 24
End: 2019-05-23

## 2019-05-23 VITALS
RESPIRATION RATE: 14 BRPM | DIASTOLIC BLOOD PRESSURE: 62 MMHG | WEIGHT: 164 LBS | BODY MASS INDEX: 26.47 KG/M2 | SYSTOLIC BLOOD PRESSURE: 104 MMHG

## 2019-05-23 PROCEDURE — 0503F POSTPARTUM CARE VISIT: CPT | Performed by: OBSTETRICS & GYNECOLOGY

## 2019-05-23 RX ORDER — FERROUS SULFATE 325(65) MG
325 TABLET ORAL DAILY
Qty: 30 TABLET | Refills: 2 | Status: SHIPPED | OUTPATIENT
Start: 2019-05-23 | End: 2019-08-21

## 2019-10-21 NOTE — PROGRESS NOTES
Subjective   Chief Complaint   Patient presents with   • Gynecologic Exam     Geraldine Enrique is a 23 y.o. year old  presenting to be seen for her annual exam.     SEXUAL Hx:  She is currently sexually active.  In the past year there there has been NO new sexual partners.    Condoms are used intermittently.  She would not like to be screened for STD's at today's exam.  Current birth control method: condoms.  She is happy with her current method of contraception and does not want to discuss alternative methods of contraception.  MENSTRUAL Hx:  Patient's last menstrual period was 10/20/2019 (approximate).  In the past 6 months her cycles have been regular, predictable and occur monthly.  Her menstrual flow is typically normal.   Each month on average there are roughly 0 day(s) of very heavy flow.    Intermenstrual bleeding is absent.    Post-coital bleeding is absent.  Dysmenorrhea: is not affecting her activities of daily living  PMS: is not affecting her activities of daily living  Her cycles are not a source of concern for her that she wishes to discuss today.  HEALTH Hx:  She exercises regularly: no (and has no plans to become more active).  She wears her seat belt: yes.  She has concerns about domestic violence: no.  OTHER THINGS SHE WANTS TO DISCUSS TODAY:  Nothing else    The following portions of the patient's history were reviewed and updated as appropriate:problem list, current medications, allergies, past family history, past medical history, past social history and past surgical history.    Social History    Tobacco Use      Smoking status: Never Smoker      Smokeless tobacco: Never Used    Review of Systems  Constitutional POS: nothing reported    NEG: anorexia or night sweats   Genitourinary POS: nothing reported    NEG: dysuria or hematuria      Gastointestinal POS: nothing reported    NEG: bloating, change in bowel habits, melena or reflux symptoms   Integument POS: nothing reported     NEG: moles that are changing in size, shape, color or rashes   Breast POS: nothing reported    NEG: persistent breast lump, skin dimpling or nipple discharge        Objective   /68   Resp 14   Wt 73.9 kg (163 lb)   LMP 10/20/2019 (Approximate)   Breastfeeding? Yes   BMI 26.31 kg/m²     General:  well developed; well nourished  no acute distress   Skin:  No suspicious lesions seen   Thyroid: normal to inspection and palpation   Breasts:  Examined in supine position  Symmetric without masses or skin dimpling  Nipples normal without inversion, lesions or discharge  There are no palpable axillary nodes   Abdomen: soft, non-tender; no masses  no umbilical or inguinal hernias are present  no hepato-splenomegaly   Pelvis: Clinical staff was present for exam  External genitalia:  normal appearance of the external genitalia including Bartholin's and Pike's glands.  :  urethral meatus normal;  Vaginal:  normal pink mucosa without prolapse or lesions.  Cervix:  normal appearance.  Uterus:  normal size, shape and consistency. retroverted;  Adnexa:  normal bimanual exam of the adnexa.  Rectal:  digital rectal exam not performed; anus visually normal appearing.        Assessment   1. Normal GYN exam     Plan   1. Pap was done today.  If she does not receive the results of the Pap within 2 weeks  time, she was instructed to call to find out the results.  I explained to Geraldine that the recommendations for Pap smear interval in a low risk patient's has lengthened to 3 years time.  I encouraged her to be seen yearly for a full physical exam including breast and pelvic exam even during the off years when PAP's will not be performed.  2. The importance of keeping all planned follow-up and taking all medications as prescribed was emphasized.  3. Follow up for annual exam 1 year         This note was electronically signed.    Louie Velazquze M.D.  October 24, 2019    Note: Speech recognition transcription software may  have been used to create portions of this document.  An attempt at proofreading has been made but errors in transcription could still be present.

## 2019-10-24 ENCOUNTER — OFFICE VISIT (OUTPATIENT)
Dept: OBSTETRICS AND GYNECOLOGY | Facility: CLINIC | Age: 24
End: 2019-10-24

## 2019-10-24 VITALS
BODY MASS INDEX: 26.31 KG/M2 | RESPIRATION RATE: 14 BRPM | DIASTOLIC BLOOD PRESSURE: 68 MMHG | WEIGHT: 163 LBS | SYSTOLIC BLOOD PRESSURE: 106 MMHG

## 2019-10-24 DIAGNOSIS — Z01.419 WELL WOMAN EXAM WITH ROUTINE GYNECOLOGICAL EXAM: Primary | ICD-10-CM

## 2019-10-24 PROCEDURE — 99395 PREV VISIT EST AGE 18-39: CPT | Performed by: OBSTETRICS & GYNECOLOGY

## 2020-06-11 ENCOUNTER — OFFICE VISIT (OUTPATIENT)
Dept: OBSTETRICS AND GYNECOLOGY | Facility: CLINIC | Age: 25
End: 2020-06-11

## 2020-06-11 ENCOUNTER — LAB (OUTPATIENT)
Dept: LAB | Facility: HOSPITAL | Age: 25
End: 2020-06-11

## 2020-06-11 VITALS — RESPIRATION RATE: 14 BRPM | WEIGHT: 169 LBS | BODY MASS INDEX: 27.28 KG/M2

## 2020-06-11 DIAGNOSIS — F34.1 DYSTHYMIA: Primary | ICD-10-CM

## 2020-06-11 LAB — TSH SERPL DL<=0.05 MIU/L-ACNC: 1.75 UIU/ML (ref 0.27–4.2)

## 2020-06-11 PROCEDURE — 99213 OFFICE O/P EST LOW 20 MIN: CPT | Performed by: OBSTETRICS & GYNECOLOGY

## 2020-06-11 PROCEDURE — 36415 COLL VENOUS BLD VENIPUNCTURE: CPT | Performed by: OBSTETRICS & GYNECOLOGY

## 2020-06-11 PROCEDURE — 84443 ASSAY THYROID STIM HORMONE: CPT | Performed by: OBSTETRICS & GYNECOLOGY

## 2020-06-11 RX ORDER — FLUOXETINE HYDROCHLORIDE 20 MG/1
20 CAPSULE ORAL DAILY
Qty: 30 CAPSULE | Refills: 5 | Status: SHIPPED | OUTPATIENT
Start: 2020-06-11 | End: 2021-04-01

## 2020-06-11 NOTE — PROGRESS NOTES
Subjective   Chief Complaint   Patient presents with   • Depression       Geraldine Enrique is a 24 y.o. year old .  No LMP recorded.  She presents because of worsening depression.  She has no thoughts of hurting herself or others.  It is been progressively getting worse since the birth of their second child.  Her  does not feel like depression and anxiety are true illnesses.  She is not getting much support at home.  She cries all the time and also has very short tempered.  Her family all lives out of town and she has very little support other than a small [place] of friends.  She does believe he would be willing to talk to a therapist.  She has yet to see a therapist herself but would be willing    She is currently breast-feeding.    The following portions of the patient's history were reviewed and updated as appropriate:current medications and allergies    Social History    Tobacco Use      Smoking status: Never Smoker      Smokeless tobacco: Never Used         Objective   Resp 14   Wt 76.7 kg (169 lb)   Breastfeeding Yes   BMI 27.28 kg/m²     Lab Review   No data reviewed    Imaging   No data reviewed        Assessment   1. Postpartum depression/dysthymia impacting day-to-day function     Plan   1. Strongly encouraged therapy.  Encouraged her to get in to see a therapist and have given her the name of Dr. Clem Leslie  2. The following tests were ordered today: TSH.  It was explained to Geraldine that all lab test should be back within the one week after they are performed. She will be notified about the results, regardless of the findings. If she has not been contacted by the office within 2 weeks after the test has been performed, it is her responsibility to contact us to learn about her results.  3. I like to start her on Prozac at low-dose and see if that makes a difference  4. The importance of keeping all planned follow-up and taking all medications as prescribed was  emphasized.  5. Follow up for recheck of Mood in 6 weeks time     New Medications Ordered This Visit   Medications   • FLUoxetine (PROzac) 20 MG capsule     Sig: Take 1 capsule by mouth Daily.     Dispense:  30 capsule     Refill:  5          This note was electronically signed.    Louie Velazquez M.D.  June 11, 2020    Total time spent today with Geraldine  was 20 minutes (level 3).  Off this time, 80% was spent face-to-face time coordinating care, answering her questions and counseling regarding pathophysiology of her presenting problem along with plans for any diagnositc work-up and treatment.    Note: Speech recognition transcription software may have been used to create portions of this document.  An attempt at proofreading has been made but errors in transcription could still be present.

## 2021-03-29 ENCOUNTER — TELEPHONE (OUTPATIENT)
Dept: OBSTETRICS AND GYNECOLOGY | Facility: CLINIC | Age: 26
End: 2021-03-29

## 2021-04-01 ENCOUNTER — OFFICE VISIT (OUTPATIENT)
Dept: OBSTETRICS AND GYNECOLOGY | Facility: CLINIC | Age: 26
End: 2021-04-01

## 2021-04-01 VITALS
WEIGHT: 194.8 LBS | DIASTOLIC BLOOD PRESSURE: 70 MMHG | SYSTOLIC BLOOD PRESSURE: 98 MMHG | HEIGHT: 66 IN | BODY MASS INDEX: 31.31 KG/M2

## 2021-04-01 DIAGNOSIS — B96.89 BACTERIAL VAGINOSIS: Primary | ICD-10-CM

## 2021-04-01 DIAGNOSIS — Z11.8 SCREENING FOR CHLAMYDIAL DISEASE: ICD-10-CM

## 2021-04-01 DIAGNOSIS — N76.0 BACTERIAL VAGINOSIS: Primary | ICD-10-CM

## 2021-04-01 PROCEDURE — 99213 OFFICE O/P EST LOW 20 MIN: CPT | Performed by: OBSTETRICS & GYNECOLOGY

## 2021-04-01 RX ORDER — METRONIDAZOLE 7.5 MG/G
GEL VAGINAL 2 TIMES DAILY
Qty: 70 G | Refills: 0 | Status: SHIPPED | OUTPATIENT
Start: 2021-04-01 | End: 2021-08-26

## 2021-04-01 NOTE — PROGRESS NOTES
"Subjective   Chief Complaint   Patient presents with   • Vaginitis     Geraldine Enrique is a 25 y.o. year old .  Patient's last menstrual period was 2021 (exact date).  She presents to be seen because of foul-smelling discharge has been present for a while.  Is not been treated empirically yet.  She is sexually active.  She is monogamous.  She has no concerns about STDs.  She just got her cycle today.    OTHER THINGS SHE WANTS TO DISCUSS TODAY:  Nothing else    The following portions of the patient's history were reviewed and updated as appropriate:current medications and allergies    Social History    Tobacco Use      Smoking status: Never Smoker      Smokeless tobacco: Never Used    Review of Systems  Constitutional POS: nothing reported    NEG: anorexia or night sweats   Genitourinary POS: nothing reported    NEG: dysuria or hematuria   Gastointestinal POS: nothing reported    NEG: bloating, change in bowel habits, melena or reflux symptoms   Integument POS: nothing reported    NEG: moles that are changing in size, shape, color or rashes   Breast POS: nothing reported    NEG: persistent breast lump, skin dimpling or nipple discharge         Objective   BP 98/70 (BP Location: Left arm, Patient Position: Sitting, Cuff Size: Adult)   Ht 167.6 cm (66\")   Wt 88.4 kg (194 lb 12.8 oz)   LMP 2021 (Exact Date)   Breastfeeding Yes   BMI 31.44 kg/m²     General:  well developed; well nourished  no acute distress   Pelvis: Clinical staff was present for exam  External genitalia:  normal appearance of the external genitalia including Bartholin's and [place]'s glands.  :  urethral meatus normal;  Vaginal:  normal pink mucosa without prolapse or lesions. blood present -  moderate amount and dark red;     Lab Review   No data reviewed    Imaging   No data reviewed        Assessment   1. Probable bacterial vaginosis by history     Plan   1. The following tests were ordered today: STD swabs for GC, " chlamydia and trichimoniasis.  It was explained to Geraldine that all lab test should be back within the one week after they are performed. She will be notified about the results, regardless of the findings. If she has not been contacted by the office within 2 weeks after the test has been performed, it is her responsibility to contact us to learn about her results.  2. Empiric treatment for BV with vaginal creams due to patient's currently breast-feeding.  3. The importance of keeping all planned follow-up and taking all medications as prescribed was emphasized.  4. Follow up PRN      New Medications Ordered This Visit   Medications   • metroNIDAZOLE (METROGEL VAGINAL) 0.75 % vaginal gel     Sig: Insert  into the vagina 2 (Two) Times a Day.     Dispense:  70 g     Refill:  0          This note was electronically signed.    Louie Velazquez M.D.  April 1, 2021    Note: Speech recognition transcription software may have been used to create portions of this document.  An attempt at proofreading has been made but errors in transcription could still be present.

## 2021-08-26 ENCOUNTER — OFFICE VISIT (OUTPATIENT)
Dept: OBSTETRICS AND GYNECOLOGY | Facility: CLINIC | Age: 26
End: 2021-08-26

## 2021-08-26 VITALS
SYSTOLIC BLOOD PRESSURE: 118 MMHG | RESPIRATION RATE: 14 BRPM | WEIGHT: 201 LBS | BODY MASS INDEX: 32.44 KG/M2 | DIASTOLIC BLOOD PRESSURE: 70 MMHG

## 2021-08-26 DIAGNOSIS — Z01.419 WELL WOMAN EXAM WITH ROUTINE GYNECOLOGICAL EXAM: Primary | ICD-10-CM

## 2021-08-26 PROCEDURE — 99395 PREV VISIT EST AGE 18-39: CPT | Performed by: OBSTETRICS & GYNECOLOGY

## 2021-08-26 NOTE — PROGRESS NOTES
Subjective   Chief Complaint   Patient presents with   • Gynecologic Exam     Geraldine Enrique is a 25 y.o. year old  presenting to be seen for her annual exam.  There is a couple things she like to talk about today.  She is had the feeling that she might defecate during intercourse.  She is also noticed some protrusion of something through the vaginal opening at times.  She has gained at least 30 pounds since the birth of her second child.    SEXUAL Hx:  She is currently sexually active.  In the past year there there has been NO new sexual partners.    Condoms are used intermittently.  She would not like to be screened for STD's at today's exam.  Current birth control method: condoms.  She is happy with her current method of contraception and does not want to discuss alternative methods of contraception.  MENSTRUAL Hx:  In the past 6 months her cycles have been regular, predictable and occur monthly.  Her menstrual flow is typically normal.   Each month on average there are roughly 0 day(s) of very heavy flow.  Intermenstrual bleeding is absent.    Post-coital bleeding is absent.  Dysmenorrhea: mild, moderate and is not affecting her activities of daily living  PMS: is not affecting her activities of daily living  Her cycles are not a source of concern for her that she wishes to discuss today.  HEALTH Hx:  She exercises regularly: yes.  She wears her seat belt: yes.  She has concerns about domestic violence: no.  OTHER THINGS SHE WANTS TO DISCUSS TODAY:  Nothing else    The following portions of the patient's history were reviewed and updated as appropriate:problem list, current medications, allergies, past family history, past medical history, past social history and past surgical history.    Social History    Tobacco Use      Smoking status: Never Smoker      Smokeless tobacco: Never Used    Review of Systems  Constitutional POS: nothing reported    NEG: anorexia or night sweats   Genitourinary POS:  nothing reported    NEG: dysuria or hematuria      Gastointestinal POS: see HPI    NEG: bloating, change in bowel habits, melena or reflux symptoms   Integument POS: nothing reported    NEG: moles that are changing in size, shape, color or rashes   Breast POS: nothing reported    NEG: persistent breast lump, skin dimpling or nipple discharge        Objective   /70   Resp 14   Wt 91.2 kg (201 lb)   LMP  (LMP Unknown)   Breastfeeding No   BMI 32.44 kg/m²     General:  well developed; well nourished  no acute distress   Skin:  No suspicious lesions seen   Thyroid: normal to inspection and palpation   Breasts:  Examined in supine position  Symmetric without masses or skin dimpling  Nipples normal without inversion, lesions or discharge  There are no palpable axillary nodes   Abdomen: soft, non-tender; no masses  no umbilical or inguinal hernias are present  no hepato-splenomegaly   Pelvis: Clinical staff was present for exam  External genitalia:  normal appearance of the external genitalia including Bartholin's and [place]'s glands.  :  urethral meatus normal;  Vaginal:  normal pink mucosa without prolapse or lesions.  Cervix:  normal appearance.  Uterus:  normal size, shape and consistency.  Adnexa:  normal bimanual exam of the adnexa.  Rectal:  digital rectal exam not performed; anus visually normal appearing.  Cystocele GRADE 1  Rectocele GRADE 2  Uterine GRADE 2        Assessment   1. Normal GYN exam with pelvic organ prolapse - this is a new finding.  It is asymptomatic and not affecting her quality of life.  2. She is up to date on all relevant gynecologic and colorectal screenings     Plan   1. Pap was not done today.  I explained to Geraldine that the recommendations for Pap smear interval in a low risk patient has lengthened to 3 years time.  I told Geraldine she still needs to be seen in our office yearly for a full physical including breast and pelvic exam.  2. Impact of weight on progression of pelvic  organ prolapse reviewed  3. Possibility of pelvic organ prolapse being improved with pelvic floor physical therapy also explained  4. I discussed with Geraldine that she may be behind on needed vaccinations for COVID.  She may be able to obtain these vaccinations at her local pharmacy OR speak about obtaining them with her primary care.  If she does obtain her vaccines, I have asked Geraldine to let us know the date each vaccine was obtained so that her medical record could be updated in our system.  5. The importance of keeping all planned follow-up and taking all medications as prescribed was emphasized.  6. Follow up for annual exam 1 year         This note was electronically signed.    Louie Velazquez M.D.  August 26, 2021    Note: Speech recognition transcription software may have been used to create portions of this document.  An attempt at proofreading has been made but errors in transcription could still be present.

## 2022-01-24 ENCOUNTER — TELEPHONE (OUTPATIENT)
Dept: OBSTETRICS AND GYNECOLOGY | Facility: CLINIC | Age: 27
End: 2022-01-24

## 2022-01-24 ENCOUNTER — LAB (OUTPATIENT)
Dept: LAB | Facility: HOSPITAL | Age: 27
End: 2022-01-24

## 2022-01-24 DIAGNOSIS — N91.4 SECONDARY OLIGOMENORRHEA: ICD-10-CM

## 2022-01-24 DIAGNOSIS — N91.4 SECONDARY OLIGOMENORRHEA: Primary | ICD-10-CM

## 2022-01-24 PROCEDURE — 84702 CHORIONIC GONADOTROPIN TEST: CPT

## 2022-01-24 NOTE — TELEPHONE ENCOUNTER
JOSÉ LUIS    PT IS ABOUT TWO WEEKS LATE ON CYCLE. HAD THREE NEGATIVE PREGNANCY TESTS. SHE NOW WANTS A BLOOD HCG.

## 2022-01-25 LAB — HCG INTACT+B SERPL-ACNC: <0.5 MIU/ML

## 2022-05-16 ENCOUNTER — TELEPHONE (OUTPATIENT)
Dept: OBSTETRICS AND GYNECOLOGY | Facility: CLINIC | Age: 27
End: 2022-05-16

## 2022-05-16 NOTE — TELEPHONE ENCOUNTER
JOSÉ LUIS- PT HAS NOT HAD A PERIOD IN OVER 2 MONTHS AND SHE SAID ITS NOT NORMAL FOR HER. I ASKED IF SHE COULD BE PREGNANT SHE SAID NO JUST STARTED HER PERIOD 3 DAYS AGO BUT WANTS TO BE CHECKED

## 2022-05-17 NOTE — TELEPHONE ENCOUNTER
Patient states she took a urine pregnancy test negative.  Does want to get pregnant.  She started her period.  Patient concerned about late menses and want appointment to discuss.  Patient transferred to  to scheduled appointment

## 2022-05-17 NOTE — TELEPHONE ENCOUNTER
If she wants to be checked for pregnancy I recommend a urine pregnancy test.  I can order a blood test if she wishes but urine testing, given her history should be fine

## 2022-05-24 ENCOUNTER — OFFICE VISIT (OUTPATIENT)
Dept: OBSTETRICS AND GYNECOLOGY | Facility: CLINIC | Age: 27
End: 2022-05-24

## 2022-05-24 ENCOUNTER — LAB (OUTPATIENT)
Dept: LAB | Facility: HOSPITAL | Age: 27
End: 2022-05-24

## 2022-05-24 VITALS — BODY MASS INDEX: 35.51 KG/M2 | RESPIRATION RATE: 14 BRPM | WEIGHT: 220 LBS

## 2022-05-24 DIAGNOSIS — N92.6 IRREGULAR MENSES: Primary | ICD-10-CM

## 2022-05-24 DIAGNOSIS — N92.6 IRREGULAR MENSES: ICD-10-CM

## 2022-05-24 LAB
HCG INTACT+B SERPL-ACNC: <0.5 MIU/ML
PROLACTIN SERPL-MCNC: 22.7 NG/ML (ref 4.79–23.3)
TSH SERPL DL<=0.05 MIU/L-ACNC: 1.83 UIU/ML (ref 0.27–4.2)

## 2022-05-24 PROCEDURE — 36415 COLL VENOUS BLD VENIPUNCTURE: CPT

## 2022-05-24 PROCEDURE — 84702 CHORIONIC GONADOTROPIN TEST: CPT

## 2022-05-24 PROCEDURE — 84146 ASSAY OF PROLACTIN: CPT

## 2022-05-24 PROCEDURE — 84443 ASSAY THYROID STIM HORMONE: CPT

## 2022-05-24 PROCEDURE — 99213 OFFICE O/P EST LOW 20 MIN: CPT | Performed by: OBSTETRICS & GYNECOLOGY

## 2022-05-24 NOTE — PROGRESS NOTES
Subjective   Chief Complaint   Patient presents with   • Menstrual Problem       Geraldine Enrique is a 26 y.o. year old .  Patient's last menstrual period was 05/15/2022 (approximate).  She comes to talk about issues with her cycles.  She has been unpredictable for at least 6 months.  She had this multiple times and other months can bleed predictably normally.  They were trying to conceive for a while but have decided now to put that on hold.  Thus far no testing has been done.  There is no nipple discharge or diplopia.  She has headaches but they have not changed.    The following portions of the patient's history were reviewed and updated as appropriate:current medications and allergies    Social History    Tobacco Use      Smoking status: Never Smoker      Smokeless tobacco: Never Used         Objective   Resp 14   Wt 99.8 kg (220 lb)   LMP 05/15/2022 (Approximate)   Breastfeeding No   BMI 35.51 kg/m²     Lab Review   No data reviewed    Imaging   No data reviewed        Assessment   1. Irregular menses most consistent with anovulation.  This is a new problem that does require additional work-up  2. Elevated BMI     Plan   1. The following tests were ordered today: HCG, PRL and TSH.  It was explained to Geraldine that all lab test should be back within the one week after they are performed. She will be notified about the results, regardless of the findings. If she has not been contacted by the office within 2 weeks after the test has been performed, it is her responsibility to contact us to learn about her results.  2. Association of elevated BMI with menstrual irregularity reviewed and explained  3. The importance of keeping all planned follow-up and taking all medications as prescribed was emphasized.  4. Follow up PRN         This note was electronically signed.    Louie Velazquez M.D.  May 24, 2022    Total time spent today with Geraldine  was 20-29 minutes (level 3) - pathophysiology of  her presenting problem along with plans for any diagnositc work-up and treatment.    Part of this note may be an electronic transcription/translation of spoken language to printed text using the Dragon Dictation System.

## 2022-08-29 NOTE — PROGRESS NOTES
Subjective   Chief Complaint   Patient presents with   • Gynecologic Exam     Geraldine Enrique is a 26 y.o. year old  presenting to be seen for her annual exam.     SEXUAL Hx:  She is currently sexually active.  In the past year there there has been NO new sexual partners.    Condoms are never used.  She would not like to be screened for STD's at today's exam.  Current birth control method: rhythm method.  She is happy with her current method of contraception and does not want to discuss alternative methods of contraception.  MENSTRUAL Hx:  Patient's last menstrual period was 2022 (approximate).  In the past 6 months her cycles have been unpredictable infrequent.  Her menstrual flow is typically normal.   Each month on average there are roughly 0 day(s) of very heavy flow.  Intermenstrual bleeding is absent.    Post-coital bleeding is absent.  Dysmenorrhea: is not affecting her activities of daily living  PMS: is not affecting her activities of daily living  Her cycles are not a source of concern for her that she wishes to discuss today.  HEALTH Hx:  She exercises regularly: no (and has no plans to become more active).  She wears her seat belt: yes.  She has concerns about domestic violence: no.  OTHER THINGS SHE WANTS TO DISCUSS TODAY:  Nothing else    The following portions of the patient's history were reviewed and updated as appropriate:problem list, current medications, allergies, past family history, past medical history, past social history and past surgical history.    Social History    Tobacco Use      Smoking status: Never Smoker      Smokeless tobacco: Never Used    Review of Systems  Constitutional POS: nothing reported    NEG: anorexia or night sweats   Genitourinary POS: MYCHAL is present but it IS NOT effecting her ADL's    NEG: dysuria or hematuria      Gastointestinal POS: nothing reported    NEG: bloating, change in bowel habits, melena or reflux symptoms   Integument POS: nothing  reported    NEG: moles that are changing in size, shape, color or rashes   Breast POS: nothing reported    NEG: persistent breast lump, skin dimpling or nipple discharge        Objective   /72   Resp 14   Wt 101 kg (223 lb)   LMP 07/28/2022 (Approximate)   Breastfeeding No   BMI 35.99 kg/m²     General:  well developed; well nourished  no acute distress   Skin:  No suspicious lesions seen   Thyroid: normal to inspection and palpation   Breasts:  Examined in supine position  Symmetric without masses or skin dimpling  Nipples normal without inversion, lesions or discharge  There are no palpable axillary nodes   Abdomen: soft, non-tender; no masses  no umbilical or inguinal hernias are present  no hepato-splenomegaly   Pelvis: Clinical staff was present for exam  External genitalia:  normal appearance of the external genitalia including Bartholin's and [place]'s glands.  :  urethral meatus normal;  Vaginal:  normal pink mucosa without prolapse or lesions.  Cervix:  normal appearance.  Uterus:  normal size, shape and consistency. retroverted;  Adnexa:  normal bimanual exam of the adnexa.  Rectal:  digital rectal exam not performed; anus visually normal appearing.        Assessment   1. Normal GYN exam  2. Oligomenorrhea related to anovulation, induced by elevated BMI  3. Sub-optimal BC - she currently is not taking sufficient folic acid  4. She is up to date on all relevant gynecologic and colorectal screenings     Plan   1. Pap was done today.  If she does not receive the results of the Pap within 2 weeks  time, she was instructed to call to find out the results.  I explained to Geraldine that the recommendations for Pap smear interval in a low risk patient's has lengthened to 3 years time.  I encouraged her to be seen yearly for a full physical exam including breast and pelvic exam even during the off years when PAP's will not be performed.  2. Folic acid for the prevention of neural tube defects was  discussed.  At least 0.4 mg should be taken preconceptionally to reduce the risk.  It was explained that this may reduce the baseline incidence of neural tube defect by as much as 65%.  Folic acid can be found in OTC multivitamins, OTC prenatal vitamins or breakfast cereal that that contains 100% of the RDA of folate.  3. The following tests were ordered today: lipid profile.  It was explained to Geraldine that all lab test should be back within the one week after they are performed. She will be notified about the results, regardless of the findings. If she has not been contacted by the office within 2 weeks after the test has been performed, it is her responsibility to contact us to learn about her results.  4. Her vaccine record was reviewed and updated.  5. I discussed with Geraldine that she may be behind on needed vaccinations for COVID.  She may be able to obtain these vaccinations at her local pharmacy OR speak about obtaining them with her primary care.  If she does obtain her vaccines, I have asked Geraldine to let us know the date each vaccine was obtained so that her medical record could be updated in our system.  6. The importance of keeping all planned follow-up and taking all medications as prescribed was emphasized.  7. Follow up for annual exam 1 year         This note was electronically signed.    Louie Velazquez M.D.  August 30, 2022    Part of this note may be an electronic transcription/translation of spoken language to printed text using the Dragon Dictation System.

## 2022-08-30 ENCOUNTER — OFFICE VISIT (OUTPATIENT)
Dept: OBSTETRICS AND GYNECOLOGY | Facility: CLINIC | Age: 27
End: 2022-08-30

## 2022-08-30 VITALS
WEIGHT: 223 LBS | DIASTOLIC BLOOD PRESSURE: 72 MMHG | SYSTOLIC BLOOD PRESSURE: 116 MMHG | BODY MASS INDEX: 35.99 KG/M2 | RESPIRATION RATE: 14 BRPM

## 2022-08-30 DIAGNOSIS — Z01.419 WELL WOMAN EXAM WITH ROUTINE GYNECOLOGICAL EXAM: Primary | ICD-10-CM

## 2022-08-30 DIAGNOSIS — Z71.85 VACCINE COUNSELING: ICD-10-CM

## 2022-08-30 DIAGNOSIS — Z31.69 ENCOUNTER FOR PRECONCEPTION CONSULTATION: ICD-10-CM

## 2022-08-30 PROCEDURE — 3008F BODY MASS INDEX DOCD: CPT | Performed by: OBSTETRICS & GYNECOLOGY

## 2022-08-30 PROCEDURE — 99395 PREV VISIT EST AGE 18-39: CPT | Performed by: OBSTETRICS & GYNECOLOGY

## 2022-08-30 PROCEDURE — 2014F MENTAL STATUS ASSESS: CPT | Performed by: OBSTETRICS & GYNECOLOGY

## 2022-08-30 NOTE — PATIENT INSTRUCTIONS
Preventing Birth Defects with Folic Acid  What is folic acid?  Folic acid is a vitamin that is used by the body to create new cells and keep the blood healthy. Everyone needs folic acid to stay healthy. It is especially important if you are pregnant.  Folic acid is artificial (synthetic). The vitamin in its natural form is called folate. Some foods are natural sources of folate, and other foods have folic acid added to them (fortified foods). You can also buy folic acid supplements or vitamins that contain folic acid.  Why is folic acid important during pregnancy?  Folic acid helps reduce your baby's risk of serious birth defects, especially the class of birth defects called neural tube defects (NTD).  Types of NTD's include:  Spina bifida. Spina bifida occurs when a baby's spinal column does not develop completely. This can cause serious, long-term (chronic) disabilities.  Anencephaly. This is a birth defect that causes your baby to be missing parts of the brain, scalp, and skull when he or she is born. Most babies born with anencephaly only live for a short amount of time.  How much folic acid do I need?  If you plan to become pregnant, you should take at least 400 mcg (micrograms) of folic acid daily. Birth defects usually occur in the earliest stages of pregnancy, often before you know you are pregnant. Taking folic acid when you start trying to become pregnant can help prevent birth defects.  While you are pregnant, you need at least 600 mcg of folic acid each day.  If you have had a child with a NTD, you may be recommended you take at least 5,000 mcg of folic acid daily.  If you take certain medications (certain medications for control of seizures) you made need more the 400 mcg.  What nutrition changes can be made?  To increase your intake of folate and folic acid, you may:  Eat more foods that are fortified with folic acid. Check food labels to see whether a food contains folic acid. Foods that are commonly  fortified with folic acid include:  Cereals.  Pastas.  Flours.  White rice.  Breads.  Eat more foods that are natural sources of folate, such as:  Legumes, including lentils, peas, and beans.  Nuts.  Vegetables, especially dark green leafy vegetables, such as spinach and mustard greens.  Citrus fruits and juices.  Your health care provider may still recommend that you take a supplement to make sure that you get enough to reduce the risk of birth defects.  Where to find support  Talk with your health care provider or your pharmacist to find a folic acid supplement that is right for you.  Your health care provider may recommend that you see a nutrition specialist (nutritionist). A nutritionist can help you make healthy food choices and get more folate from your diet.  Nutrition education programs may be available through your community health department.  Where to find more information  Visit the following websites to learn more about the importance of folic acid.  Centers for Disease Control and Prevention: www.cdc.gov/ncbddd/folicacid/about.html  The Office on Women's Health: womenshAM Pharmath.gov/publications/our-publications/fact-sheet/folic-acid.html  National Institutes of Health: ods.od.nih.gov/factsheets/Folate-Consumer  The March of Dimes: www.marchofdimes.org/pregnancy/folic-acid.aspx  Summary  It is important to start taking folic acid when you are planning to become pregnant, because many birth defects can happen before you know that you are pregnant.  You can get more folate and folic acid from your diet. Look for certain foods that are fortified with folic acid.  Your health care provider may recommend that you take a supplement to get enough folic acid.    This information is not intended to replace advice given to you by your health care provider. Make sure you discuss any questions you have with your health care provider.  Document Released: 01/18/2017 Document Revised: 11/30/2018 Document Reviewed:  01/18/2017  Elsevier Patient Education © 2020 [employer].

## 2022-08-31 ENCOUNTER — LAB (OUTPATIENT)
Dept: LAB | Facility: HOSPITAL | Age: 27
End: 2022-08-31

## 2022-08-31 DIAGNOSIS — Z01.419 WELL WOMAN EXAM WITH ROUTINE GYNECOLOGICAL EXAM: ICD-10-CM

## 2022-08-31 PROBLEM — E78.2 MIXED HYPERLIPIDEMIA: Status: ACTIVE | Noted: 2022-08-31

## 2022-08-31 LAB
CHOLEST SERPL-MCNC: 196 MG/DL (ref 0–200)
HDLC SERPL-MCNC: 35 MG/DL (ref 40–60)
LDLC SERPL CALC-MCNC: 141 MG/DL (ref 0–100)
LDLC/HDLC SERPL: 3.99 {RATIO}
TRIGL SERPL-MCNC: 107 MG/DL (ref 0–150)
VLDLC SERPL-MCNC: 20 MG/DL (ref 5–40)

## 2022-08-31 PROCEDURE — 80061 LIPID PANEL: CPT

## 2022-09-02 LAB — REF LAB TEST METHOD: NORMAL

## 2023-01-17 ENCOUNTER — TELEPHONE (OUTPATIENT)
Dept: OBSTETRICS AND GYNECOLOGY | Facility: CLINIC | Age: 28
End: 2023-01-17
Payer: COMMERCIAL

## 2023-01-17 DIAGNOSIS — N91.4 SECONDARY OLIGOMENORRHEA: Primary | ICD-10-CM

## 2023-01-17 NOTE — TELEPHONE ENCOUNTER
She can stop in anytime to get the blood pregnancy test.  If the blood pregnancy test is negative this most likely happens from lack of ovulation.  If the pregnancy test is negative Iva gave her a 10-day course of medicine to see if that induces menses.  That will let me know for sure.

## 2023-01-17 NOTE — TELEPHONE ENCOUNTER
JOSÉ LUIS    Pt states she has not had a Menstrual Cycle since 12/13/22. Pt has taken negative UPT test. Pt wants Provider to sent an order for her to have an HCG blood test.

## 2023-01-20 ENCOUNTER — LAB (OUTPATIENT)
Dept: LAB | Facility: HOSPITAL | Age: 28
End: 2023-01-20
Payer: MEDICAID

## 2023-01-20 DIAGNOSIS — N91.4 SECONDARY OLIGOMENORRHEA: ICD-10-CM

## 2023-01-20 PROCEDURE — 36415 COLL VENOUS BLD VENIPUNCTURE: CPT

## 2023-01-20 PROCEDURE — 84702 CHORIONIC GONADOTROPIN TEST: CPT

## 2023-01-21 DIAGNOSIS — Z34.90 PREGNANCY WITH FETUS OF UNKNOWN GESTATIONAL AGE: Primary | ICD-10-CM

## 2023-01-21 LAB — HCG INTACT+B SERPL-ACNC: 28.5 MIU/ML

## 2023-01-23 ENCOUNTER — LAB (OUTPATIENT)
Dept: LAB | Facility: HOSPITAL | Age: 28
End: 2023-01-23
Payer: MEDICAID

## 2023-01-23 DIAGNOSIS — Z34.90 PREGNANCY WITH FETUS OF UNKNOWN GESTATIONAL AGE: ICD-10-CM

## 2023-01-23 PROCEDURE — 36415 COLL VENOUS BLD VENIPUNCTURE: CPT

## 2023-01-23 PROCEDURE — 84702 CHORIONIC GONADOTROPIN TEST: CPT

## 2023-01-24 DIAGNOSIS — Z34.90 PREGNANCY WITH FETUS OF UNKNOWN GESTATIONAL AGE: Primary | ICD-10-CM

## 2023-01-24 LAB — HCG INTACT+B SERPL-ACNC: 84.3 MIU/ML

## 2023-01-26 ENCOUNTER — LAB (OUTPATIENT)
Dept: LAB | Facility: HOSPITAL | Age: 28
End: 2023-01-26
Payer: MEDICAID

## 2023-01-26 DIAGNOSIS — Z34.90 PREGNANCY WITH FETUS OF UNKNOWN GESTATIONAL AGE: ICD-10-CM

## 2023-01-26 LAB — HCG INTACT+B SERPL-ACNC: 213 MIU/ML

## 2023-01-26 PROCEDURE — 36415 COLL VENOUS BLD VENIPUNCTURE: CPT

## 2023-01-26 PROCEDURE — 84702 CHORIONIC GONADOTROPIN TEST: CPT

## 2023-01-27 ENCOUNTER — TELEPHONE (OUTPATIENT)
Dept: OBSTETRICS AND GYNECOLOGY | Facility: CLINIC | Age: 28
End: 2023-01-27
Payer: COMMERCIAL

## 2023-01-27 DIAGNOSIS — Z34.90 PREGNANCY WITH FETUS OF UNKNOWN GESTATIONAL AGE: Primary | ICD-10-CM

## 2023-01-27 NOTE — TELEPHONE ENCOUNTER
Pt was notified    No Repair - Repaired With Adjacent Surgical Defect Text (Leave Blank If You Do Not Want): After obtaining clear surgical margins the defect was repaired concurrently with another surgical defect which was in close approximation.

## 2023-01-30 ENCOUNTER — LAB (OUTPATIENT)
Dept: LAB | Facility: HOSPITAL | Age: 28
End: 2023-01-30
Payer: COMMERCIAL

## 2023-01-30 DIAGNOSIS — Z34.90 PREGNANCY WITH FETUS OF UNKNOWN GESTATIONAL AGE: ICD-10-CM

## 2023-01-30 PROCEDURE — 84702 CHORIONIC GONADOTROPIN TEST: CPT

## 2023-01-30 PROCEDURE — 36415 COLL VENOUS BLD VENIPUNCTURE: CPT

## 2023-01-31 DIAGNOSIS — Z34.90 PREGNANCY WITH FETUS OF UNKNOWN GESTATIONAL AGE: Primary | ICD-10-CM

## 2023-01-31 LAB — HCG INTACT+B SERPL-ACNC: 651 MIU/ML

## 2023-02-01 ENCOUNTER — TELEPHONE (OUTPATIENT)
Dept: OBSTETRICS AND GYNECOLOGY | Facility: CLINIC | Age: 28
End: 2023-02-01
Payer: COMMERCIAL

## 2023-02-01 NOTE — TELEPHONE ENCOUNTER
Attempted to call patient twice, there was no answer and no ability to LVM.  Wrote patient bMenu message requesting call back.

## 2023-02-01 NOTE — TELEPHONE ENCOUNTER
"\"Please have her repeat HCG level tomorrow as ordered by Dr Velazquez. With her current HCG level ultrasound would not be beneficial. Please review bleeding precautions (more than a pad an hour for 2 hours) pain (severe cramping) and fever (greater than 100.4) precautions. With bleeding in early pregnancy we are most concerned about miscarriage. The hcg level tomorrow will give us more information. Ideally this number should double in 48 hours.   Sheryl Pulido, APRN\"    Called and informed patient of advisement in full, she verified understanding.  "

## 2023-02-01 NOTE — TELEPHONE ENCOUNTER
Patient called back, LMP is 12/13/2022.  She states that she just now ~30-40 minutes ago noticed some red spotting, normal blood consistence.  No cramping or pain at all. She has been having her hCG followed lately with Dr. Velazquez before scheduling NOB appt.

## 2023-02-01 NOTE — TELEPHONE ENCOUNTER
JOSÉ LUIS    Pt found out she is pregnant 2 weeks ago LMP 12/13/22. Pt does not have NOB appointment set. Pt states today she noticed she is spotting bright red. Pt states NO cramping

## 2023-02-02 ENCOUNTER — LAB (OUTPATIENT)
Dept: LAB | Facility: HOSPITAL | Age: 28
End: 2023-02-02
Payer: COMMERCIAL

## 2023-02-02 DIAGNOSIS — Z34.90 PREGNANCY WITH FETUS OF UNKNOWN GESTATIONAL AGE: ICD-10-CM

## 2023-02-02 PROCEDURE — 84702 CHORIONIC GONADOTROPIN TEST: CPT

## 2023-02-02 PROCEDURE — 36415 COLL VENOUS BLD VENIPUNCTURE: CPT

## 2023-02-03 DIAGNOSIS — O20.9 BLEEDING IN EARLY PREGNANCY: Primary | ICD-10-CM

## 2023-02-03 LAB — HCG INTACT+B SERPL-ACNC: 2026 MIU/ML

## 2023-02-06 PROBLEM — Z34.80 PRENATAL CARE, SUBSEQUENT PREGNANCY: Status: ACTIVE | Noted: 2023-02-06

## 2023-02-07 ENCOUNTER — INITIAL PRENATAL (OUTPATIENT)
Dept: OBSTETRICS AND GYNECOLOGY | Facility: CLINIC | Age: 28
End: 2023-02-07
Payer: COMMERCIAL

## 2023-02-07 VITALS — DIASTOLIC BLOOD PRESSURE: 76 MMHG | WEIGHT: 218.4 LBS | SYSTOLIC BLOOD PRESSURE: 112 MMHG | BODY MASS INDEX: 35.25 KG/M2

## 2023-02-07 DIAGNOSIS — O41.8X10 SUBCHORIONIC HEMATOMA IN FIRST TRIMESTER, SINGLE OR UNSPECIFIED FETUS: Primary | ICD-10-CM

## 2023-02-07 DIAGNOSIS — O46.8X1 SUBCHORIONIC HEMATOMA IN FIRST TRIMESTER, SINGLE OR UNSPECIFIED FETUS: Primary | ICD-10-CM

## 2023-02-07 PROCEDURE — 0501F PRENATAL FLOW SHEET: CPT | Performed by: OBSTETRICS & GYNECOLOGY

## 2023-02-07 RX ORDER — PRENATAL VIT NO.126/IRON/FOLIC 28MG-0.8MG
1 TABLET ORAL DAILY
COMMUNITY

## 2023-02-07 NOTE — PROGRESS NOTES
Subjective   Chief Complaint   Patient presents with   • Initial Prenatal Visit     Light headaches (has not tried taking tylenol), no other c/c       Geraldine Erika Enrqiue is a 27 y.o. year old .  Patient's last menstrual period was 2022.  She comes in for mainly to initiate prenatal care.  We have been following sequential hCGs and her hCG most recently was greater than 2000.    The following portions of the patient's history were reviewed and updated as appropriate:current medications and allergies    Social History    Tobacco Use      Smoking status: Never      Smokeless tobacco: Never         Objective   /76   Wt 99.1 kg (218 lb 6.4 oz)   LMP 2022   BMI 35.25 kg/m²     Lab Review   HCG    Imaging   Pelvic ultrasound report        Assessment   1. Early intrauterine pregnancy with size and consistent with dates.  Mean sac diameter not yet sufficient to exclude viable intrauterine pregnancy     Plan   1. Needs repeat ultrasound in 2 weeks and if cardiac activity present initiate prenatal thereafter  2. The importance of keeping all planned follow-up and taking all medications as prescribed was emphasized.         This note was electronically signed.    Louie Velazquez M.D.  2023    Total time spent today with Geraldine  was 30-39 minutes (level 4) - pathophysiology of her presenting problem along with plans for any diagnositc work-up and treatment.    Part of this note may be an electronic transcription/translation of spoken language to printed text using the Dragon Dictation System.

## 2023-02-21 ENCOUNTER — OFFICE VISIT (OUTPATIENT)
Dept: OBSTETRICS AND GYNECOLOGY | Facility: CLINIC | Age: 28
End: 2023-02-21
Payer: COMMERCIAL

## 2023-02-21 VITALS — RESPIRATION RATE: 14 BRPM | BODY MASS INDEX: 35.02 KG/M2 | WEIGHT: 217 LBS

## 2023-02-21 DIAGNOSIS — O46.8X1 SUBCHORIONIC HEMATOMA IN FIRST TRIMESTER, SINGLE OR UNSPECIFIED FETUS: Primary | ICD-10-CM

## 2023-02-21 DIAGNOSIS — O41.8X10 SUBCHORIONIC HEMATOMA IN FIRST TRIMESTER, SINGLE OR UNSPECIFIED FETUS: Primary | ICD-10-CM

## 2023-02-21 PROCEDURE — 99213 OFFICE O/P EST LOW 20 MIN: CPT | Performed by: OBSTETRICS & GYNECOLOGY

## 2023-02-21 NOTE — PROGRESS NOTES
Subjective   Chief Complaint   Patient presents with   • Follow-up       Geraldine Erika Enrique is a 27 y.o. year old .  Patient's last menstrual period was 2022.  She comes in for follow-up.  She had an ultrasound a couple of weeks ago where gestational sac was seen but there was not a fetal pole present.  She has had no bleeding at all since her last visit    The following portions of the patient's history were reviewed and updated as appropriate:current medications and allergies    Social History    Tobacco Use      Smoking status: Never      Smokeless tobacco: Never         Objective   Resp 14   Wt 98.4 kg (217 lb)   LMP 2022   BMI 35.02 kg/m²     Lab Review   No data reviewed    Imaging   Pelvic ultrasound report        Assessment   1. Probable embryonic demise given findings.  With crown-rump length less than 7 mm and mean sac diameter less than 25 mm cannot conclusively determine embryonic demise     Plan   1. Repeat ultrasound in 1 week  2. The importance of keeping all planned follow-up and taking all medications as prescribed was emphasized.         This note was electronically signed.    Louie Velazquez M.D.  2023    Total time spent today with Geraldine  was 20-29 minutes (level 3) - pathophysiology of her presenting problem along with plans for any diagnositc work-up and treatment.    Part of this note may be an electronic transcription/translation of spoken language to printed text using the Dragon Dictation System.

## 2023-03-02 ENCOUNTER — OFFICE VISIT (OUTPATIENT)
Dept: OBSTETRICS AND GYNECOLOGY | Facility: CLINIC | Age: 28
End: 2023-03-02
Payer: COMMERCIAL

## 2023-03-02 VITALS — BODY MASS INDEX: 35.02 KG/M2 | WEIGHT: 217 LBS | RESPIRATION RATE: 14 BRPM

## 2023-03-02 DIAGNOSIS — O03.4 INCOMPLETE MISCARRIAGE: Primary | ICD-10-CM

## 2023-03-02 PROBLEM — Z34.80 PRENATAL CARE, SUBSEQUENT PREGNANCY: Status: RESOLVED | Noted: 2023-02-06 | Resolved: 2023-03-02

## 2023-03-02 PROCEDURE — 99213 OFFICE O/P EST LOW 20 MIN: CPT | Performed by: OBSTETRICS & GYNECOLOGY

## 2023-03-02 NOTE — PROGRESS NOTES
Subjective   Chief Complaint   Patient presents with   • Follow-up     Geraldine Erika Enrique is a 27 y.o. year old  who comes to review her recent testing and discuss next steps.  Started with some brown discharge last evening with subsequent increasing mucus.  Its become more red and brighter and heavier today as the day has gone on.    OTHER THINGS SHE WANTS TO DISCUSS TODAY:  Nothing else       Objective   Resp 14   Wt 98.4 kg (217 lb)   LMP 2022   Breastfeeding Unknown   BMI 35.02 kg/m²     Lab Review   No data reviewed    Imaging   Pelvic ultrasound report       Assessment   1. Miscarriage in evolution     Plan   1. At this point I think suction D&C is not necessary as I think that a high probability she will pass this on her own  2. Assuming this does occur she will need lab work 2 to 3 weeks later to check to make sure she has no residual hCG.  If bleeding resolves and the hCG is negative no work-up beyond that is needed  3. Would like to see away at least 1 month following completion of this miscarriage before trying to conceive again and would continue with prenatal vitamins  4. I reviewed etiology for why miscarriages occur and reassured her this was not of any fault of her own and not likely increase the probability of subsequent miscarriage beyond baseline risks  5. The importance of keeping all planned follow-up and taking all medications as prescribed was emphasized.  6. Follow up PRN          Total time spent today with Geraldine  was 20-29 minutes (level 3) - pathophysiology of her presenting problem along with plans for any diagnositc work-up and treatment.    This note was electronically signed.    Louie Velazquez M.D.  2023    Part of this note may be an electronic transcription/translation of spoken language to printed text using the Dragon Dictation System.

## 2023-03-03 ENCOUNTER — TELEPHONE (OUTPATIENT)
Dept: OBSTETRICS AND GYNECOLOGY | Facility: CLINIC | Age: 28
End: 2023-03-03
Payer: COMMERCIAL

## 2023-03-03 DIAGNOSIS — O03.9 COMPLETE MISCARRIAGE: Primary | ICD-10-CM

## 2023-03-03 NOTE — TELEPHONE ENCOUNTER
JOSÉ LUIS    Pt was seen yesterday 3/2/23. Pt wanted to let Provider know she is done passing clots and heavy bleeding now it is just brown discharge .Pt believes she has now completely had the Miscarriage

## 2023-03-03 NOTE — TELEPHONE ENCOUNTER
Tell her thanks for the update and hopefully this is behind her.  I have placed a lab order for about 2 to 3 weeks from now to recheck her hCG.  She can stop into any [facility] lab and get that blood work drawn.

## 2023-03-08 ENCOUNTER — TELEPHONE (OUTPATIENT)
Dept: OBSTETRICS AND GYNECOLOGY | Facility: CLINIC | Age: 28
End: 2023-03-08
Payer: COMMERCIAL

## 2023-03-08 RX ORDER — MISOPROSTOL 200 UG/1
200 TABLET ORAL 4 TIMES DAILY
Qty: 8 TABLET | Refills: 0 | Status: SHIPPED | OUTPATIENT
Start: 2023-03-08 | End: 2023-03-10

## 2023-03-08 NOTE — TELEPHONE ENCOUNTER
Please let her know I am sending in a prescription for her to take for the next couple of days.  1 pill every 6 hours for the next 48 hours will hopefully help resolve this quicker.    New Medications Ordered This Visit   Medications   • miSOPROStol (Cytotec) 200 MCG tablet     Sig: Take 1 tablet by mouth 4 (Four) Times a Day for 2 days.     Dispense:  8 tablet     Refill:  0

## 2023-03-08 NOTE — TELEPHONE ENCOUNTER
JOSÉ LUIS    Pt called stating that on Thursday she started to miscarry and on Friday she passed the fetus. She is currently bleeding and cramping. She would like to speak with Dr. Velazquez or a nurse.

## 2023-03-08 NOTE — TELEPHONE ENCOUNTER
Called and spoke with patient.  She states that she passed contents of conception on Friday and the cramping and bleeding did then seem to stop, but started back up again and states she is now going through a pad every couple hours, but it is not fully saturating a pad.  She is still experiencing lower abdominal cramping as well.  She would like to know how long is this okay to be cramping/bleeding.

## 2023-03-22 ENCOUNTER — LAB (OUTPATIENT)
Dept: LAB | Facility: HOSPITAL | Age: 28
End: 2023-03-22
Payer: COMMERCIAL

## 2023-03-22 DIAGNOSIS — O03.9 COMPLETE MISCARRIAGE: ICD-10-CM

## 2023-03-22 LAB — HCG INTACT+B SERPL-ACNC: 8.26 MIU/ML

## 2023-03-22 PROCEDURE — 36415 COLL VENOUS BLD VENIPUNCTURE: CPT

## 2023-03-22 PROCEDURE — 84702 CHORIONIC GONADOTROPIN TEST: CPT

## 2023-03-23 DIAGNOSIS — O03.9 COMPLETE MISCARRIAGE: Primary | ICD-10-CM

## 2023-05-22 ENCOUNTER — HOSPITAL ENCOUNTER (EMERGENCY)
Facility: HOSPITAL | Age: 28
Discharge: HOME | End: 2023-05-22
Attending: EMERGENCY MEDICINE | Admitting: EMERGENCY MEDICINE
Payer: COMMERCIAL

## 2023-05-22 ENCOUNTER — APPOINTMENT (OUTPATIENT)
Dept: GENERAL RADIOLOGY | Facility: HOSPITAL | Age: 28
End: 2023-05-22
Payer: COMMERCIAL

## 2023-05-22 VITALS
WEIGHT: 212 LBS | HEART RATE: 86 BPM | TEMPERATURE: 98 F | BODY MASS INDEX: 34.07 KG/M2 | SYSTOLIC BLOOD PRESSURE: 146 MMHG | DIASTOLIC BLOOD PRESSURE: 98 MMHG | OXYGEN SATURATION: 95 % | RESPIRATION RATE: 16 BRPM | HEIGHT: 66 IN

## 2023-05-22 DIAGNOSIS — R07.9 NONSPECIFIC CHEST PAIN: Primary | ICD-10-CM

## 2023-05-22 LAB
ALBUMIN SERPL-MCNC: 4.1 G/DL (ref 3.5–5.2)
ALBUMIN/GLOB SERPL: 1.4 G/DL
ALP SERPL-CCNC: 76 U/L (ref 39–117)
ALT SERPL W P-5'-P-CCNC: 15 U/L (ref 1–33)
ANION GAP SERPL CALCULATED.3IONS-SCNC: 11 MMOL/L (ref 5–15)
AST SERPL-CCNC: 13 U/L (ref 1–32)
BASOPHILS # BLD AUTO: 0.03 10*3/MM3 (ref 0–0.2)
BASOPHILS NFR BLD AUTO: 0.3 % (ref 0–1.5)
BILIRUB SERPL-MCNC: 0.4 MG/DL (ref 0–1.2)
BUN SERPL-MCNC: 9 MG/DL (ref 6–20)
BUN/CREAT SERPL: 13.2 (ref 7–25)
CALCIUM SPEC-SCNC: 8.9 MG/DL (ref 8.6–10.5)
CHLORIDE SERPL-SCNC: 102 MMOL/L (ref 98–107)
CO2 SERPL-SCNC: 23 MMOL/L (ref 22–29)
CREAT SERPL-MCNC: 0.68 MG/DL (ref 0.57–1)
DEPRECATED RDW RBC AUTO: 39.3 FL (ref 37–54)
EGFRCR SERPLBLD CKD-EPI 2021: 121.1 ML/MIN/1.73
EOSINOPHIL # BLD AUTO: 0.45 10*3/MM3 (ref 0–0.4)
EOSINOPHIL NFR BLD AUTO: 5.2 % (ref 0.3–6.2)
ERYTHROCYTE [DISTWIDTH] IN BLOOD BY AUTOMATED COUNT: 12.8 % (ref 12.3–15.4)
GEN 5 2HR TROPONIN T REFLEX: <6 NG/L
GLOBULIN UR ELPH-MCNC: 2.9 GM/DL
GLUCOSE SERPL-MCNC: 178 MG/DL (ref 65–99)
HCT VFR BLD AUTO: 40.5 % (ref 34–46.6)
HGB BLD-MCNC: 13 G/DL (ref 12–15.9)
IMM GRANULOCYTES # BLD AUTO: 0.02 10*3/MM3 (ref 0–0.05)
IMM GRANULOCYTES NFR BLD AUTO: 0.2 % (ref 0–0.5)
LYMPHOCYTES # BLD AUTO: 1.91 10*3/MM3 (ref 0.7–3.1)
LYMPHOCYTES NFR BLD AUTO: 22 % (ref 19.6–45.3)
MCH RBC QN AUTO: 26.9 PG (ref 26.6–33)
MCHC RBC AUTO-ENTMCNC: 32.1 G/DL (ref 31.5–35.7)
MCV RBC AUTO: 83.9 FL (ref 79–97)
MONOCYTES # BLD AUTO: 0.41 10*3/MM3 (ref 0.1–0.9)
MONOCYTES NFR BLD AUTO: 4.7 % (ref 5–12)
NEUTROPHILS NFR BLD AUTO: 5.85 10*3/MM3 (ref 1.7–7)
NEUTROPHILS NFR BLD AUTO: 67.6 % (ref 42.7–76)
NRBC BLD AUTO-RTO: 0 /100 WBC (ref 0–0.2)
PLATELET # BLD AUTO: 256 10*3/MM3 (ref 140–450)
PMV BLD AUTO: 9.8 FL (ref 6–12)
POTASSIUM SERPL-SCNC: 3.5 MMOL/L (ref 3.5–5.2)
PROT SERPL-MCNC: 7 G/DL (ref 6–8.5)
QT INTERVAL: 364 MS
QTC INTERVAL: 435 MS
RBC # BLD AUTO: 4.83 10*6/MM3 (ref 3.77–5.28)
SODIUM SERPL-SCNC: 136 MMOL/L (ref 136–145)
TROPONIN T DELTA: NORMAL
TROPONIN T SERPL HS-MCNC: <6 NG/L
WBC NRBC COR # BLD: 8.67 10*3/MM3 (ref 3.4–10.8)

## 2023-05-22 PROCEDURE — 99284 EMERGENCY DEPT VISIT MOD MDM: CPT

## 2023-05-22 PROCEDURE — 80053 COMPREHEN METABOLIC PANEL: CPT | Performed by: EMERGENCY MEDICINE

## 2023-05-22 PROCEDURE — 85025 COMPLETE CBC W/AUTO DIFF WBC: CPT | Performed by: EMERGENCY MEDICINE

## 2023-05-22 PROCEDURE — 93005 ELECTROCARDIOGRAM TRACING: CPT | Performed by: EMERGENCY MEDICINE

## 2023-05-22 PROCEDURE — 93005 ELECTROCARDIOGRAM TRACING: CPT

## 2023-05-22 PROCEDURE — 71045 X-RAY EXAM CHEST 1 VIEW: CPT

## 2023-05-22 PROCEDURE — 84484 ASSAY OF TROPONIN QUANT: CPT | Performed by: EMERGENCY MEDICINE

## 2023-05-22 PROCEDURE — 36415 COLL VENOUS BLD VENIPUNCTURE: CPT

## 2023-05-22 RX ORDER — ACETAMINOPHEN 500 MG
1000 TABLET ORAL ONCE
Status: COMPLETED | OUTPATIENT
Start: 2023-05-22 | End: 2023-05-22

## 2023-05-22 RX ORDER — SODIUM CHLORIDE 0.9 % (FLUSH) 0.9 %
10 SYRINGE (ML) INJECTION AS NEEDED
Status: DISCONTINUED | OUTPATIENT
Start: 2023-05-22 | End: 2023-05-23 | Stop reason: HOSPADM

## 2023-05-22 RX ADMIN — ACETAMINOPHEN 1000 MG: 500 TABLET ORAL at 20:01

## 2023-05-22 NOTE — ED PROVIDER NOTES
Subjective   History of Present Illness  27-year-old female presented to the emergency department with some chest discomfort.  Patient states that she has had chest pains like this for quite some time.  States that they normally go away.  She has had these chest pains for the last 48 hours.  She states that started yesterday morning when she woke up.  Not slightly better throughout the day, however return last night's and has persisted to the morning.  The dull pain.  Epigastric region.  Radiates up to the chest.  Denies any shortness of breath or cough.  No abdominal pain or vomiting.  No diarrhea.  No headache or change in vision.  No focal weakness.    History provided by:  Patient   used: No        Review of Systems   Constitutional: Negative for chills and fever.   HENT: Negative for congestion, ear pain and sore throat.    Eyes: Negative for visual disturbance.   Respiratory: Negative for shortness of breath.    Cardiovascular: Positive for chest pain.   Gastrointestinal: Negative for abdominal pain.   Genitourinary: Negative for difficulty urinating.   Musculoskeletal: Negative for arthralgias.   Skin: Negative for rash.   Neurological: Negative for dizziness, weakness and numbness.   Psychiatric/Behavioral: Negative for agitation.       Past Medical History:   Diagnosis Date   • Kidney stone complicating pregnancy 2016   • Scoliosis 2006    surgically corrected at age 13-13 yo       Allergies   Allergen Reactions   • Morphine And Related Hives       Past Surgical History:   Procedure Laterality Date   • CERVICAL CHIARI DECOMPRESSION POSTERIOR  2007   • SPINAL GROWTH RODS  2007    for Scoliosis   • TONSILLECTOMY AND ADENOIDECTOMY  1995       Family History   Problem Relation Age of Onset   • Hypertension Mother    • Diabetes Sister    • Hypertension Maternal Grandmother        Social History     Socioeconomic History   • Marital status:    Tobacco Use   • Smoking status: Never   •  Smokeless tobacco: Never   Substance and Sexual Activity   • Alcohol use: No   • Drug use: No   • Sexual activity: Yes     Partners: Male           Objective   Physical Exam  Vitals and nursing note reviewed.   Constitutional:       General: She is not in acute distress.     Appearance: She is not ill-appearing or toxic-appearing.   HENT:      Mouth/Throat:      Pharynx: No posterior oropharyngeal erythema.   Eyes:      Conjunctiva/sclera: Conjunctivae normal.      Pupils: Pupils are equal, round, and reactive to light.   Cardiovascular:      Rate and Rhythm: Normal rate and regular rhythm.   Pulmonary:      Effort: Pulmonary effort is normal. No respiratory distress.   Abdominal:      General: Abdomen is flat. There is no distension.      Palpations: There is no mass.      Tenderness: There is no abdominal tenderness. There is no guarding or rebound.   Musculoskeletal:         General: No deformity. Normal range of motion.   Skin:     General: Skin is warm.      Findings: No rash.   Neurological:      General: No focal deficit present.      Mental Status: She is alert and oriented to person, place, and time.      Motor: No weakness.         ECG 12 Lead      Date/Time: 5/22/2023 7:38 PM  Performed by: Ramesh Templeton MD  Authorized by: Ramesh Templeton MD   Interpreted by physician  Comparison: compared with previous ECG   Similar to previous ECG  Rhythm: sinus rhythm  Rate: normal  QRS axis: normal  Conduction: conduction normal  ST Segments: ST segments normal  T Waves: T waves normal  Other: no other findings  Clinical impression: normal ECG  Comments: EKG was directly visualized by myself, interpretations as documented in hospital course.                 ED Course  ED Course as of 05/22/23 2218   Mon May 22, 2023   2051 XR Chest 1 View  Imaging was directly visualized by myself, per my interpretations, no acute process.   [JK]   2051 CBC & Differential(!) [JK]   2051 Comprehensive Metabolic Panel(!)  [JK]   2051 High Sensitivity Troponin T  Labs reviewed by myself, CBC, CMP and troponin unremarkable. [JK]   2216 On reevaluation, patient is pain-free.  Hemodynamically stable.  Patient's symptoms consistent with nonspecific chest pain.  Patient is to follow-up with PCP in 48 hours.  Strict return precaution.  Verbalized understanding. [JK]      ED Course User Index  [JK] Ramesh Templeton MD                                           Medical Decision Making  27-year-old female presenting with some nonspecific chest discomfort.  Seems more atypical in nature.  Patient currently hemodynamically stable.  Normal EKG.  Low risk for acute coronary syndrome based on heart score.  Work-up initiated.    Differential diagnosis includes CAD, GERD, dyspepsia, costochondritis, pneumonia.    Amount and/or Complexity of Data Reviewed  Labs: ordered. Decision-making details documented in ED Course.  Radiology: ordered and independent interpretation performed. Decision-making details documented in ED Course.  ECG/medicine tests: ordered and independent interpretation performed. Decision-making details documented in ED Course.      Risk  OTC drugs.  Prescription drug management.          Final diagnoses:   Nonspecific chest pain       ED Disposition  ED Disposition     ED Disposition   Discharge    Condition   Stable    Comment   --             PATIENT CONNECTION - [facility] [address]  [phone]  Call in 1 day           Medication List      No changes were made to your prescriptions during this visit.          Ramesh Templeton MD  05/22/23 7879

## 2023-05-23 LAB
QT INTERVAL: 366 MS
QTC INTERVAL: 397 MS

## 2023-06-05 ENCOUNTER — TELEPHONE (OUTPATIENT)
Dept: OBSTETRICS AND GYNECOLOGY | Facility: CLINIC | Age: 28
End: 2023-06-05
Payer: COMMERCIAL

## 2023-06-05 DIAGNOSIS — O09.299 H/O MISCARRIAGE, CURRENTLY PREGNANT: Primary | ICD-10-CM

## 2023-06-05 NOTE — TELEPHONE ENCOUNTER
Caller: Geraldine Enrique    Relationship to patient: Self    Best call back number: [phone]    POS PREG TEST(6-4) WITH LMP 5-1.  PT IS SCHEDULED FOR U/S AND NEW OB ON 07-@8AM.  PT WOULD LIKE TO KNOW IF BETA HCG LAB ORDERS CAN BE PUT IN.  SHE HAD A MISCARRIAGE IN MARCH AND IS NERVOUS.  PLEASE CALL PT BACK AT ANYTIME AND CAN LVM.  THANK YOU.

## 2023-06-05 NOTE — TELEPHONE ENCOUNTER
Called and informed patient that hCG labs have been entered for her and that she can report to lab to get that started - covered lab locations and hours accordingly.  Patient verified understanding and was appreciative.

## 2023-06-06 ENCOUNTER — LAB (OUTPATIENT)
Dept: LAB | Facility: HOSPITAL | Age: 28
End: 2023-06-06
Payer: COMMERCIAL

## 2023-06-06 DIAGNOSIS — O09.299 H/O MISCARRIAGE, CURRENTLY PREGNANT: ICD-10-CM

## 2023-06-06 LAB — HCG INTACT+B SERPL-ACNC: 114 MIU/ML

## 2023-06-06 PROCEDURE — 84702 CHORIONIC GONADOTROPIN TEST: CPT

## 2023-06-06 PROCEDURE — 36415 COLL VENOUS BLD VENIPUNCTURE: CPT

## 2023-06-07 DIAGNOSIS — O09.299 H/O MISCARRIAGE, CURRENTLY PREGNANT: Primary | ICD-10-CM

## 2023-06-08 ENCOUNTER — LAB (OUTPATIENT)
Dept: LAB | Facility: HOSPITAL | Age: 28
End: 2023-06-08
Payer: COMMERCIAL

## 2023-06-08 DIAGNOSIS — O09.299 H/O MISCARRIAGE, CURRENTLY PREGNANT: ICD-10-CM

## 2023-06-08 LAB — HCG INTACT+B SERPL-ACNC: 312 MIU/ML

## 2023-06-08 PROCEDURE — 84702 CHORIONIC GONADOTROPIN TEST: CPT

## 2023-06-08 PROCEDURE — 36415 COLL VENOUS BLD VENIPUNCTURE: CPT

## 2023-06-09 DIAGNOSIS — O09.299 H/O MISCARRIAGE, CURRENTLY PREGNANT: Primary | ICD-10-CM

## 2023-06-12 ENCOUNTER — LAB (OUTPATIENT)
Dept: LAB | Facility: HOSPITAL | Age: 28
End: 2023-06-12
Payer: COMMERCIAL

## 2023-06-12 DIAGNOSIS — O09.299 H/O MISCARRIAGE, CURRENTLY PREGNANT: Primary | ICD-10-CM

## 2023-06-12 DIAGNOSIS — O09.299 H/O MISCARRIAGE, CURRENTLY PREGNANT: ICD-10-CM

## 2023-06-12 LAB — HCG INTACT+B SERPL-ACNC: 782 MIU/ML

## 2023-06-12 PROCEDURE — 36415 COLL VENOUS BLD VENIPUNCTURE: CPT

## 2023-06-12 PROCEDURE — 84702 CHORIONIC GONADOTROPIN TEST: CPT

## 2023-06-15 ENCOUNTER — LAB (OUTPATIENT)
Dept: LAB | Facility: HOSPITAL | Age: 28
End: 2023-06-15
Payer: COMMERCIAL

## 2023-06-15 DIAGNOSIS — O09.291 CURRENT PREGNANCY IN FIRST TRIMESTER WITH HISTORY OF SPONTANEOUS ABORTION DURING PRIOR PREGNANCY: Primary | ICD-10-CM

## 2023-06-15 DIAGNOSIS — O09.299 H/O MISCARRIAGE, CURRENTLY PREGNANT: ICD-10-CM

## 2023-06-15 LAB — HCG INTACT+B SERPL-ACNC: 2549 MIU/ML

## 2023-06-15 PROCEDURE — 36415 COLL VENOUS BLD VENIPUNCTURE: CPT

## 2023-06-15 PROCEDURE — 84702 CHORIONIC GONADOTROPIN TEST: CPT

## 2023-07-08 PROBLEM — Z34.80 PRENATAL CARE, SUBSEQUENT PREGNANCY: Status: ACTIVE | Noted: 2023-07-08

## 2023-08-04 ENCOUNTER — ROUTINE PRENATAL (OUTPATIENT)
Dept: OBSTETRICS AND GYNECOLOGY | Facility: CLINIC | Age: 28
End: 2023-08-04
Payer: COMMERCIAL

## 2023-08-04 ENCOUNTER — LAB (OUTPATIENT)
Dept: LAB | Facility: HOSPITAL | Age: 28
End: 2023-08-04
Payer: COMMERCIAL

## 2023-08-04 VITALS — SYSTOLIC BLOOD PRESSURE: 118 MMHG | BODY MASS INDEX: 32.77 KG/M2 | WEIGHT: 203 LBS | DIASTOLIC BLOOD PRESSURE: 76 MMHG

## 2023-08-04 DIAGNOSIS — Z34.81 PRENATAL CARE, SUBSEQUENT PREGNANCY IN FIRST TRIMESTER: Primary | ICD-10-CM

## 2023-08-04 DIAGNOSIS — Z34.81 PRENATAL CARE, SUBSEQUENT PREGNANCY IN FIRST TRIMESTER: ICD-10-CM

## 2023-08-04 LAB — EXTERNAL NIPT: NORMAL

## 2023-08-11 ENCOUNTER — DOCUMENTATION (OUTPATIENT)
Dept: OBSTETRICS AND GYNECOLOGY | Facility: CLINIC | Age: 28
End: 2023-08-11
Payer: COMMERCIAL

## 2023-08-11 LAB
Lab: NORMAL
NTRA FETAL FRACTION: NORMAL
NTRA GENDER OF FETUS: NORMAL
NTRA MONOSOMY X AGE-BASED RISK TEXT: NORMAL
NTRA MONOSOMY X RESULT TEXT: NORMAL
NTRA MONOSOMY X RISK SCORE TEXT: NORMAL
NTRA TRIPLOIDY RESULT TEXT: NORMAL
NTRA TRISOMY 13 AGE-BASED RISK TEXT: NORMAL
NTRA TRISOMY 13 RESULT TEXT: NORMAL
NTRA TRISOMY 13 RISK SCORE TEXT: NORMAL
NTRA TRISOMY 18 AGE-BASED RISK TEXT: NORMAL
NTRA TRISOMY 18 RESULT TEXT: NORMAL
NTRA TRISOMY 18 RISK SCORE TEXT: NORMAL
NTRA TRISOMY 21 AGE-BASED RISK TEXT: NORMAL
NTRA TRISOMY 21 RESULT TEXT: NORMAL
NTRA TRISOMY 21 RISK SCORE TEXT: NORMAL

## 2023-09-05 ENCOUNTER — ROUTINE PRENATAL (OUTPATIENT)
Dept: OBSTETRICS AND GYNECOLOGY | Facility: CLINIC | Age: 28
End: 2023-09-05
Payer: COMMERCIAL

## 2023-09-05 VITALS — DIASTOLIC BLOOD PRESSURE: 72 MMHG | WEIGHT: 203 LBS | BODY MASS INDEX: 32.77 KG/M2 | SYSTOLIC BLOOD PRESSURE: 118 MMHG

## 2023-09-05 DIAGNOSIS — L29.2 VULVAR PRURITUS: ICD-10-CM

## 2023-09-05 DIAGNOSIS — Z34.82 PRENATAL CARE, SUBSEQUENT PREGNANCY IN SECOND TRIMESTER: Primary | ICD-10-CM

## 2023-09-05 DIAGNOSIS — Z71.85 VACCINE COUNSELING: ICD-10-CM

## 2023-09-05 NOTE — PROGRESS NOTES
Chief Complaint   Patient presents with    Routine Prenatal Visit       HPI: Geraldine is a  currently at 16w4d who today reports the following:  Contractions - No; Leaking - No; Vaginal bleeding - No; Heartburn - No.  Has had a little itching and thinks she could have a yeast infection.  Does have a little discharge but that has not changed.    ROS:  GI: Nausea - No ; Constipation - No; Diarrhea - No    Neuro: Headache - No ; Visual change - No    Respiratory: Cough - No; SOB - No; fever - No     EXAM:  Vitals: See prenatal flowsheet   Abdomen: See prenatal flowsheet   Urine glucose/protein: See prenatal flowsheet   Pelvic: See prenatal flowsheet     Lab Results   Component Value Date    ABO A 2023    RH Positive 2023    ABSCRN Negative 2023       MDM:  Impression: Supervision of low risk pregnancy  Vulvar pruritus   Tests ordered/done today: none   Counseling: Prenatal labs reviewed  Need to continue with PNV's  Circumcision was discussed.  It was explained to Geraldine that the procedure is elective.  There are probably no long-term medical benefits for circumcision.  Studies have suggested in the first year of life, there may be a reduction in urinary tract infections in a circumcised male's.  This difference disappears after the first year of life.  Some have suggested that circumcision reduces nerve endings and the tip of the penis which could have an impact to him as he ages.  There are small risks of the procedure including taking off to much or too little skin from the foreskin.  He will be anesthetized and most of the time this is successful.  It cannot be guaranteed that the child will feel no pain.  After hearing this information, she wishes to have her son circumsized.  Flu vaccine recommended at any gestational age  We will try over-the-counter Monistat and see if it helps.  If that does not help would look at cleaning products and see if that makes a difference.   Tests ordered  today for her next visit:   U/S for anatomic screening

## 2023-10-10 ENCOUNTER — ROUTINE PRENATAL (OUTPATIENT)
Dept: OBSTETRICS AND GYNECOLOGY | Facility: CLINIC | Age: 28
End: 2023-10-10
Payer: COMMERCIAL

## 2023-10-10 VITALS — DIASTOLIC BLOOD PRESSURE: 74 MMHG | SYSTOLIC BLOOD PRESSURE: 122 MMHG | WEIGHT: 205 LBS | BODY MASS INDEX: 33.09 KG/M2

## 2023-10-10 DIAGNOSIS — O44.43 LOW-LYING PLACENTA WITHOUT HEMORRHAGE, THIRD TRIMESTER: ICD-10-CM

## 2023-10-10 DIAGNOSIS — O26.892 HEARTBURN DURING PREGNANCY IN SECOND TRIMESTER: ICD-10-CM

## 2023-10-10 DIAGNOSIS — Z34.82 PRENATAL CARE, SUBSEQUENT PREGNANCY IN SECOND TRIMESTER: Primary | ICD-10-CM

## 2023-10-10 DIAGNOSIS — R12 HEARTBURN DURING PREGNANCY IN SECOND TRIMESTER: ICD-10-CM

## 2023-10-10 PROBLEM — O26.899 HEARTBURN IN PREGNANCY: Status: ACTIVE | Noted: 2023-10-10

## 2023-10-10 RX ORDER — FAMOTIDINE 20 MG/1
20 TABLET, FILM COATED ORAL 2 TIMES DAILY PRN
Start: 2023-10-10

## 2023-10-10 RX ORDER — OMEPRAZOLE 20 MG/1
20 CAPSULE, DELAYED RELEASE ORAL DAILY
Start: 2023-10-10

## 2023-10-10 RX ORDER — FERROUS SULFATE 325(65) MG
325 TABLET ORAL
Qty: 30 TABLET | Refills: 8 | Status: SHIPPED | OUTPATIENT
Start: 2023-10-10

## 2023-10-10 NOTE — PROGRESS NOTES
Chief Complaint   Patient presents with    Routine Prenatal Visit       HPI: Geraldine is a  currently at 21w4d who today reports the following:  Contractions - No; Leaking - No; Vaginal bleeding - No; Heartburn - YES.  Wants to start taking Fe due to fatigue    ROS:  GI: Nausea - No ; Constipation - No; Diarrhea - No    Neuro: Headache - No ; Visual change - No    Respiratory: Cough - No; SOB - No; fever - No     EXAM:  Vitals: See prenatal flowsheet   Abdomen: See prenatal flowsheet   Urine glucose/protein: See prenatal flowsheet   Pelvic: See prenatal flowsheet     Lab Results   Component Value Date    ABO A 2023    RH Positive 2023    ABSCRN Negative 2023       MDM:  Impression: Supervision of low risk pregnancy  Placenta previa / low-lying placenta  GERD in pregnancy  Fatigue   Tests ordered/done today: U/S for anatomic screening - anatomy completely seen today   Counseling: Prenatal labs reviewed  Need to continue with PNV's  I explained to Geraldine that today's ultrasound showed a anterior low-lying.  This means a portion of the placenta is near or covering the internal cervical canal.  In > 90% of cases, the placenta will migrate away from the cervix as the pregnancy progresses.  If by the early third trimester the placental location has not changed, the chance for spontaneous resolution diminishes greatly.  If by the third trimester the placental location has not changed, she should refrain from intercourse.  If by ~ 32 weeks, the placental location has not changed, the delivery will need to be by  section ideally between 36-37 weeks.  Risks for placenta previa including but not limited to bleeding and  labor were discussed.  All questions were answered.   Tests ordered today for her next visit:   U/S for anatomic screening  U/S for f/u of placental location        New Medications Ordered This Visit   Medications    famotidine (Pepcid) 20 MG tablet     Sig: Take 1 tablet  by mouth 2 (Two) Times a Day As Needed for Heartburn.    omeprazole (priLOSEC) 20 MG capsule     Sig: Take 1 capsule by mouth Daily.    ferrous sulfate 325 (65 FE) MG tablet     Sig: Take 1 tablet by mouth Daily Before Lunch.     Dispense:  30 tablet     Refill:  8   ,

## 2023-11-04 ENCOUNTER — ANESTHESIA EVENT CONVERTED (OUTPATIENT)
Dept: ANESTHESIOLOGY | Facility: HOSPITAL | Age: 28
DRG: 818 | End: 2023-11-04
Payer: COMMERCIAL

## 2023-11-04 ENCOUNTER — APPOINTMENT (OUTPATIENT)
Dept: GENERAL RADIOLOGY | Facility: HOSPITAL | Age: 28
DRG: 818 | End: 2023-11-04
Payer: COMMERCIAL

## 2023-11-04 ENCOUNTER — APPOINTMENT (OUTPATIENT)
Dept: CT IMAGING | Facility: HOSPITAL | Age: 28
DRG: 818 | End: 2023-11-04
Payer: COMMERCIAL

## 2023-11-04 ENCOUNTER — HOSPITAL ENCOUNTER (INPATIENT)
Facility: HOSPITAL | Age: 28
LOS: 1 days | Discharge: HOME | DRG: 818 | End: 2023-11-05
Attending: OBSTETRICS & GYNECOLOGY | Admitting: OBSTETRICS & GYNECOLOGY
Payer: COMMERCIAL

## 2023-11-04 ENCOUNTER — ANESTHESIA EVENT (OUTPATIENT)
Dept: PERIOP | Facility: HOSPITAL | Age: 28
End: 2023-11-04
Payer: COMMERCIAL

## 2023-11-04 ENCOUNTER — ANESTHESIA (OUTPATIENT)
Dept: PERIOP | Facility: HOSPITAL | Age: 28
End: 2023-11-04
Payer: COMMERCIAL

## 2023-11-04 DIAGNOSIS — N20.1 URETERAL STONE: ICD-10-CM

## 2023-11-04 PROBLEM — O26.832 KIDNEY STONE COMPLICATING PREGNANCY, SECOND TRIMESTER: Status: ACTIVE | Noted: 2023-11-04

## 2023-11-04 PROBLEM — N20.0 KIDNEY STONE COMPLICATING PREGNANCY, SECOND TRIMESTER: Status: ACTIVE | Noted: 2023-11-04

## 2023-11-04 PROBLEM — O99.891 KIDNEY STONE COMPLICATING PREGNANCY, SECOND TRIMESTER: Status: ACTIVE | Noted: 2023-11-04

## 2023-11-04 LAB
ABO GROUP BLD: NORMAL
ALBUMIN SERPL-MCNC: 3.4 G/DL (ref 3.5–5.2)
ALBUMIN/GLOB SERPL: 1.2 G/DL
ALP SERPL-CCNC: 67 U/L (ref 39–117)
ALT SERPL W P-5'-P-CCNC: 5 U/L (ref 1–33)
ANION GAP SERPL CALCULATED.3IONS-SCNC: 11 MMOL/L (ref 5–15)
AST SERPL-CCNC: 8 U/L (ref 1–32)
BACTERIA UR QL AUTO: ABNORMAL /HPF
BILIRUB SERPL-MCNC: 0.2 MG/DL (ref 0–1.2)
BILIRUB UR QL STRIP: NEGATIVE
BLD GP AB SCN SERPL QL: NEGATIVE
BUN SERPL-MCNC: 6 MG/DL (ref 6–20)
BUN/CREAT SERPL: 10.2 (ref 7–25)
CALCIUM SPEC-SCNC: 8.4 MG/DL (ref 8.6–10.5)
CHLORIDE SERPL-SCNC: 104 MMOL/L (ref 98–107)
CLARITY UR: ABNORMAL
CO2 SERPL-SCNC: 21 MMOL/L (ref 22–29)
COLOR UR: YELLOW
CREAT SERPL-MCNC: 0.59 MG/DL (ref 0.57–1)
DEPRECATED RDW RBC AUTO: 40.1 FL (ref 37–54)
EGFRCR SERPLBLD CKD-EPI 2021: 125.3 ML/MIN/1.73
ERYTHROCYTE [DISTWIDTH] IN BLOOD BY AUTOMATED COUNT: 13.2 % (ref 12.3–15.4)
GLOBULIN UR ELPH-MCNC: 2.9 GM/DL
GLUCOSE SERPL-MCNC: 93 MG/DL (ref 65–99)
GLUCOSE UR STRIP-MCNC: NEGATIVE MG/DL
HCT VFR BLD AUTO: 35 % (ref 34–46.6)
HGB BLD-MCNC: 11.2 G/DL (ref 12–15.9)
HGB UR QL STRIP.AUTO: ABNORMAL
HYALINE CASTS UR QL AUTO: ABNORMAL /LPF
KETONES UR QL STRIP: NEGATIVE
LEUKOCYTE ESTERASE UR QL STRIP.AUTO: NEGATIVE
MCH RBC QN AUTO: 26.9 PG (ref 26.6–33)
MCHC RBC AUTO-ENTMCNC: 32 G/DL (ref 31.5–35.7)
MCV RBC AUTO: 83.9 FL (ref 79–97)
NITRITE UR QL STRIP: NEGATIVE
PH UR STRIP.AUTO: 6 [PH] (ref 5–8)
PLATELET # BLD AUTO: 204 10*3/MM3 (ref 140–450)
PMV BLD AUTO: 9.8 FL (ref 6–12)
POTASSIUM SERPL-SCNC: 3.8 MMOL/L (ref 3.5–5.2)
PROT SERPL-MCNC: 6.3 G/DL (ref 6–8.5)
PROT UR QL STRIP: ABNORMAL
RBC # BLD AUTO: 4.17 10*6/MM3 (ref 3.77–5.28)
RBC # UR STRIP: ABNORMAL /HPF
REF LAB TEST METHOD: ABNORMAL
RH BLD: POSITIVE
SODIUM SERPL-SCNC: 136 MMOL/L (ref 136–145)
SP GR UR STRIP: 1.02 (ref 1–1.03)
SQUAMOUS #/AREA URNS HPF: ABNORMAL /HPF
T&S EXPIRATION DATE: NORMAL
UROBILINOGEN UR QL STRIP: ABNORMAL
WBC # UR STRIP: ABNORMAL /HPF
WBC NRBC COR # BLD: 9.19 10*3/MM3 (ref 3.4–10.8)

## 2023-11-04 PROCEDURE — 25010000002 CEFAZOLIN PER 500 MG: Performed by: UROLOGY

## 2023-11-04 PROCEDURE — 25010000002 HYDROMORPHONE PER 4 MG: Performed by: OBSTETRICS & GYNECOLOGY

## 2023-11-04 PROCEDURE — 76000 FLUOROSCOPY <1 HR PHYS/QHP: CPT

## 2023-11-04 PROCEDURE — 59025 FETAL NON-STRESS TEST: CPT | Performed by: OBSTETRICS & GYNECOLOGY

## 2023-11-04 PROCEDURE — 25810000003 LACTATED RINGERS PER 1000 ML: Performed by: UROLOGY

## 2023-11-04 PROCEDURE — 0T778DZ DILATION OF LEFT URETER WITH INTRALUMINAL DEVICE, VIA NATURAL OR ARTIFICIAL OPENING ENDOSCOPIC: ICD-10-PCS | Performed by: UROLOGY

## 2023-11-04 PROCEDURE — 25010000002 ONDANSETRON PER 1 MG: Performed by: UROLOGY

## 2023-11-04 PROCEDURE — 52356 CYSTO/URETERO W/LITHOTRIPSY: CPT | Performed by: UROLOGY

## 2023-11-04 PROCEDURE — 74176 CT ABD & PELVIS W/O CONTRAST: CPT

## 2023-11-04 PROCEDURE — 59025 FETAL NON-STRESS TEST: CPT

## 2023-11-04 PROCEDURE — C1769 GUIDE WIRE: HCPCS | Performed by: UROLOGY

## 2023-11-04 PROCEDURE — 99221 1ST HOSP IP/OBS SF/LOW 40: CPT | Performed by: OBSTETRICS & GYNECOLOGY

## 2023-11-04 PROCEDURE — 86850 RBC ANTIBODY SCREEN: CPT | Performed by: UROLOGY

## 2023-11-04 PROCEDURE — 87086 URINE CULTURE/COLONY COUNT: CPT | Performed by: UROLOGY

## 2023-11-04 PROCEDURE — 74420 UROGRAPHY RTRGR +-KUB: CPT | Performed by: UROLOGY

## 2023-11-04 PROCEDURE — 85027 COMPLETE CBC AUTOMATED: CPT | Performed by: OBSTETRICS & GYNECOLOGY

## 2023-11-04 PROCEDURE — 0TF48ZZ FRAGMENTATION IN LEFT KIDNEY PELVIS, VIA NATURAL OR ARTIFICIAL OPENING ENDOSCOPIC: ICD-10-PCS | Performed by: UROLOGY

## 2023-11-04 PROCEDURE — 25010000002 FENTANYL CITRATE (PF) 100 MCG/2ML SOLUTION: Performed by: NURSE ANESTHETIST, CERTIFIED REGISTERED

## 2023-11-04 PROCEDURE — 81001 URINALYSIS AUTO W/SCOPE: CPT | Performed by: OBSTETRICS & GYNECOLOGY

## 2023-11-04 PROCEDURE — 0TF78ZZ FRAGMENTATION IN LEFT URETER, VIA NATURAL OR ARTIFICIAL OPENING ENDOSCOPIC: ICD-10-PCS | Performed by: UROLOGY

## 2023-11-04 PROCEDURE — 25010000002 PROPOFOL 10 MG/ML EMULSION: Performed by: NURSE ANESTHETIST, CERTIFIED REGISTERED

## 2023-11-04 PROCEDURE — 86901 BLOOD TYPING SEROLOGIC RH(D): CPT | Performed by: UROLOGY

## 2023-11-04 PROCEDURE — 25810000003 LACTATED RINGERS PER 1000 ML: Performed by: OBSTETRICS & GYNECOLOGY

## 2023-11-04 PROCEDURE — 25810000003 LACTATED RINGERS PER 1000 ML: Performed by: NURSE ANESTHETIST, CERTIFIED REGISTERED

## 2023-11-04 PROCEDURE — 87086 URINE CULTURE/COLONY COUNT: CPT | Performed by: OBSTETRICS & GYNECOLOGY

## 2023-11-04 PROCEDURE — 36415 COLL VENOUS BLD VENIPUNCTURE: CPT | Performed by: OBSTETRICS & GYNECOLOGY

## 2023-11-04 PROCEDURE — 25010000002 HYDROMORPHONE 1 MG/ML SOLUTION: Performed by: OBSTETRICS & GYNECOLOGY

## 2023-11-04 PROCEDURE — 86900 BLOOD TYPING SEROLOGIC ABO: CPT | Performed by: UROLOGY

## 2023-11-04 PROCEDURE — C2617 STENT, NON-COR, TEM W/O DEL: HCPCS | Performed by: UROLOGY

## 2023-11-04 PROCEDURE — 80053 COMPREHEN METABOLIC PANEL: CPT | Performed by: OBSTETRICS & GYNECOLOGY

## 2023-11-04 PROCEDURE — 25010000002 DEXAMETHASONE PER 1 MG: Performed by: NURSE ANESTHETIST, CERTIFIED REGISTERED

## 2023-11-04 PROCEDURE — 99222 1ST HOSP IP/OBS MODERATE 55: CPT | Performed by: UROLOGY

## 2023-11-04 PROCEDURE — 25810000003 LACTATED RINGERS SOLUTION: Performed by: OBSTETRICS & GYNECOLOGY

## 2023-11-04 DEVICE — URETERAL STENT
Type: IMPLANTABLE DEVICE | Site: URETER | Status: FUNCTIONAL
Brand: PERCUFLEX™ PLUS

## 2023-11-04 RX ORDER — NALOXONE HCL 0.4 MG/ML
0.4 VIAL (ML) INJECTION
Status: DISCONTINUED | OUTPATIENT
Start: 2023-11-04 | End: 2023-11-04 | Stop reason: HOSPADM

## 2023-11-04 RX ORDER — SODIUM CHLORIDE 0.9 % (FLUSH) 0.9 %
10 SYRINGE (ML) INJECTION AS NEEDED
Status: DISCONTINUED | OUTPATIENT
Start: 2023-11-04 | End: 2023-11-04 | Stop reason: HOSPADM

## 2023-11-04 RX ORDER — FENTANYL CITRATE 50 UG/ML
INJECTION, SOLUTION INTRAMUSCULAR; INTRAVENOUS AS NEEDED
Status: DISCONTINUED | OUTPATIENT
Start: 2023-11-04 | End: 2023-11-04 | Stop reason: SURG

## 2023-11-04 RX ORDER — FAMOTIDINE 10 MG/ML
20 INJECTION, SOLUTION INTRAVENOUS ONCE
Status: COMPLETED | OUTPATIENT
Start: 2023-11-04 | End: 2023-11-04

## 2023-11-04 RX ORDER — SODIUM CHLORIDE 0.9 % (FLUSH) 0.9 %
10 SYRINGE (ML) INJECTION AS NEEDED
Status: DISCONTINUED | OUTPATIENT
Start: 2023-11-04 | End: 2023-11-05 | Stop reason: HOSPADM

## 2023-11-04 RX ORDER — ZOLPIDEM TARTRATE 5 MG/1
5 TABLET ORAL ONCE
Status: COMPLETED | OUTPATIENT
Start: 2023-11-04 | End: 2023-11-04

## 2023-11-04 RX ORDER — HYDROXYZINE HYDROCHLORIDE 25 MG/1
50 TABLET, FILM COATED ORAL NIGHTLY PRN
Status: DISCONTINUED | OUTPATIENT
Start: 2023-11-04 | End: 2023-11-04 | Stop reason: HOSPADM

## 2023-11-04 RX ORDER — LIDOCAINE HYDROCHLORIDE 10 MG/ML
INJECTION, SOLUTION EPIDURAL; INFILTRATION; INTRACAUDAL; PERINEURAL AS NEEDED
Status: DISCONTINUED | OUTPATIENT
Start: 2023-11-04 | End: 2023-11-04 | Stop reason: SURG

## 2023-11-04 RX ORDER — BISACODYL 10 MG
10 SUPPOSITORY, RECTAL RECTAL DAILY PRN
Status: DISCONTINUED | OUTPATIENT
Start: 2023-11-04 | End: 2023-11-04 | Stop reason: HOSPADM

## 2023-11-04 RX ORDER — CALCIUM CARBONATE 500 MG/1
2 TABLET, CHEWABLE ORAL 3 TIMES DAILY PRN
Status: DISCONTINUED | OUTPATIENT
Start: 2023-11-04 | End: 2023-11-04 | Stop reason: HOSPADM

## 2023-11-04 RX ORDER — LIDOCAINE HYDROCHLORIDE 10 MG/ML
0.5 INJECTION, SOLUTION EPIDURAL; INFILTRATION; INTRACAUDAL; PERINEURAL ONCE AS NEEDED
Status: DISCONTINUED | OUTPATIENT
Start: 2023-11-04 | End: 2023-11-04 | Stop reason: HOSPADM

## 2023-11-04 RX ORDER — SODIUM CHLORIDE, SODIUM LACTATE, POTASSIUM CHLORIDE, CALCIUM CHLORIDE 600; 310; 30; 20 MG/100ML; MG/100ML; MG/100ML; MG/100ML
125 INJECTION, SOLUTION INTRAVENOUS CONTINUOUS
Status: DISCONTINUED | OUTPATIENT
Start: 2023-11-04 | End: 2023-11-05 | Stop reason: HOSPADM

## 2023-11-04 RX ORDER — SODIUM CHLORIDE 9 MG/ML
40 INJECTION, SOLUTION INTRAVENOUS AS NEEDED
Status: DISCONTINUED | OUTPATIENT
Start: 2023-11-04 | End: 2023-11-04 | Stop reason: HOSPADM

## 2023-11-04 RX ORDER — ONDANSETRON 2 MG/ML
4 INJECTION INTRAMUSCULAR; INTRAVENOUS EVERY 6 HOURS PRN
Status: DISCONTINUED | OUTPATIENT
Start: 2023-11-04 | End: 2023-11-05 | Stop reason: HOSPADM

## 2023-11-04 RX ORDER — HYDROMORPHONE HYDROCHLORIDE 1 MG/ML
0.5 INJECTION, SOLUTION INTRAMUSCULAR; INTRAVENOUS; SUBCUTANEOUS
Status: DISCONTINUED | OUTPATIENT
Start: 2023-11-04 | End: 2023-11-04 | Stop reason: HOSPADM

## 2023-11-04 RX ORDER — MAGNESIUM HYDROXIDE 1200 MG/15ML
LIQUID ORAL AS NEEDED
Status: DISCONTINUED | OUTPATIENT
Start: 2023-11-04 | End: 2023-11-04 | Stop reason: HOSPADM

## 2023-11-04 RX ORDER — ONDANSETRON 4 MG/1
8 TABLET, FILM COATED ORAL EVERY 8 HOURS PRN
Status: DISCONTINUED | OUTPATIENT
Start: 2023-11-04 | End: 2023-11-05 | Stop reason: HOSPADM

## 2023-11-04 RX ORDER — DOCUSATE SODIUM 100 MG/1
100 CAPSULE, LIQUID FILLED ORAL 2 TIMES DAILY PRN
Status: DISCONTINUED | OUTPATIENT
Start: 2023-11-04 | End: 2023-11-05 | Stop reason: HOSPADM

## 2023-11-04 RX ORDER — SUCCINYLCHOLINE/SOD CL,ISO/PF 200MG/10ML
SYRINGE (ML) INTRAVENOUS AS NEEDED
Status: DISCONTINUED | OUTPATIENT
Start: 2023-11-04 | End: 2023-11-04 | Stop reason: SURG

## 2023-11-04 RX ORDER — ONDANSETRON 2 MG/ML
4 INJECTION INTRAMUSCULAR; INTRAVENOUS EVERY 8 HOURS PRN
Status: DISCONTINUED | OUTPATIENT
Start: 2023-11-04 | End: 2023-11-05 | Stop reason: HOSPADM

## 2023-11-04 RX ORDER — ACETAMINOPHEN 325 MG/1
650 TABLET ORAL EVERY 4 HOURS PRN
Status: DISCONTINUED | OUTPATIENT
Start: 2023-11-04 | End: 2023-11-04 | Stop reason: HOSPADM

## 2023-11-04 RX ORDER — SODIUM CHLORIDE 0.9 % (FLUSH) 0.9 %
10 SYRINGE (ML) INJECTION EVERY 12 HOURS SCHEDULED
Status: DISCONTINUED | OUTPATIENT
Start: 2023-11-04 | End: 2023-11-05 | Stop reason: HOSPADM

## 2023-11-04 RX ORDER — FERROUS SULFATE 325(65) MG
325 TABLET ORAL
Status: DISCONTINUED | OUTPATIENT
Start: 2023-11-05 | End: 2023-11-05 | Stop reason: HOSPADM

## 2023-11-04 RX ORDER — TAMSULOSIN HYDROCHLORIDE 0.4 MG/1
0.4 CAPSULE ORAL DAILY
Status: DISCONTINUED | OUTPATIENT
Start: 2023-11-04 | End: 2023-11-05 | Stop reason: HOSPADM

## 2023-11-04 RX ORDER — SODIUM CHLORIDE 0.9 % (FLUSH) 0.9 %
10 SYRINGE (ML) INJECTION EVERY 12 HOURS SCHEDULED
Status: DISCONTINUED | OUTPATIENT
Start: 2023-11-04 | End: 2023-11-04 | Stop reason: HOSPADM

## 2023-11-04 RX ORDER — OXYCODONE HYDROCHLORIDE AND ACETAMINOPHEN 5; 325 MG/1; MG/1
1 TABLET ORAL EVERY 4 HOURS PRN
Status: DISCONTINUED | OUTPATIENT
Start: 2023-11-04 | End: 2023-11-05 | Stop reason: HOSPADM

## 2023-11-04 RX ORDER — PROPOFOL 10 MG/ML
VIAL (ML) INTRAVENOUS AS NEEDED
Status: DISCONTINUED | OUTPATIENT
Start: 2023-11-04 | End: 2023-11-04 | Stop reason: SURG

## 2023-11-04 RX ORDER — PRENATAL VIT/IRON FUM/FOLIC AC 27MG-0.8MG
1 TABLET ORAL DAILY
Status: DISCONTINUED | OUTPATIENT
Start: 2023-11-04 | End: 2023-11-05 | Stop reason: HOSPADM

## 2023-11-04 RX ORDER — SODIUM CHLORIDE, SODIUM LACTATE, POTASSIUM CHLORIDE, CALCIUM CHLORIDE 600; 310; 30; 20 MG/100ML; MG/100ML; MG/100ML; MG/100ML
150 INJECTION, SOLUTION INTRAVENOUS CONTINUOUS
Status: DISCONTINUED | OUTPATIENT
Start: 2023-11-04 | End: 2023-11-05 | Stop reason: HOSPADM

## 2023-11-04 RX ORDER — DEXAMETHASONE SODIUM PHOSPHATE 4 MG/ML
INJECTION, SOLUTION INTRA-ARTICULAR; INTRALESIONAL; INTRAMUSCULAR; INTRAVENOUS; SOFT TISSUE AS NEEDED
Status: DISCONTINUED | OUTPATIENT
Start: 2023-11-04 | End: 2023-11-04 | Stop reason: SURG

## 2023-11-04 RX ORDER — SODIUM CHLORIDE, SODIUM LACTATE, POTASSIUM CHLORIDE, CALCIUM CHLORIDE 600; 310; 30; 20 MG/100ML; MG/100ML; MG/100ML; MG/100ML
INJECTION, SOLUTION INTRAVENOUS CONTINUOUS PRN
Status: DISCONTINUED | OUTPATIENT
Start: 2023-11-04 | End: 2023-11-04 | Stop reason: SURG

## 2023-11-04 RX ORDER — PHENYLEPHRINE HCL IN 0.9% NACL 1 MG/10 ML
SYRINGE (ML) INTRAVENOUS AS NEEDED
Status: DISCONTINUED | OUTPATIENT
Start: 2023-11-04 | End: 2023-11-04 | Stop reason: SURG

## 2023-11-04 RX ADMIN — HYDROMORPHONE HYDROCHLORIDE 1 MG: 1 INJECTION, SOLUTION INTRAMUSCULAR; INTRAVENOUS; SUBCUTANEOUS at 06:52

## 2023-11-04 RX ADMIN — FAMOTIDINE 20 MG: 10 INJECTION INTRAVENOUS at 23:00

## 2023-11-04 RX ADMIN — Medication 100 MG: at 16:08

## 2023-11-04 RX ADMIN — LIDOCAINE HYDROCHLORIDE 50 MG: 10 INJECTION, SOLUTION EPIDURAL; INFILTRATION; INTRACAUDAL; PERINEURAL at 16:08

## 2023-11-04 RX ADMIN — ZOLPIDEM TARTRATE 5 MG: 5 TABLET ORAL at 23:00

## 2023-11-04 RX ADMIN — HYDROMORPHONE HYDROCHLORIDE 0.5 MG: 1 INJECTION, SOLUTION INTRAMUSCULAR; INTRAVENOUS; SUBCUTANEOUS at 11:45

## 2023-11-04 RX ADMIN — DEXAMETHASONE SODIUM PHOSPHATE 4 MG: 4 INJECTION, SOLUTION INTRAMUSCULAR; INTRAVENOUS at 16:08

## 2023-11-04 RX ADMIN — SODIUM CHLORIDE, POTASSIUM CHLORIDE, SODIUM LACTATE AND CALCIUM CHLORIDE 125 ML/HR: 600; 310; 30; 20 INJECTION, SOLUTION INTRAVENOUS at 18:10

## 2023-11-04 RX ADMIN — SODIUM CHLORIDE 2000 MG: 900 INJECTION INTRAVENOUS at 22:04

## 2023-11-04 RX ADMIN — Medication 200 MCG: at 16:37

## 2023-11-04 RX ADMIN — SODIUM CHLORIDE, POTASSIUM CHLORIDE, SODIUM LACTATE AND CALCIUM CHLORIDE 125 ML/HR: 600; 310; 30; 20 INJECTION, SOLUTION INTRAVENOUS at 11:31

## 2023-11-04 RX ADMIN — SODIUM CHLORIDE, POTASSIUM CHLORIDE, SODIUM LACTATE AND CALCIUM CHLORIDE 500 ML: 600; 310; 30; 20 INJECTION, SOLUTION INTRAVENOUS at 06:23

## 2023-11-04 RX ADMIN — SODIUM CHLORIDE, POTASSIUM CHLORIDE, SODIUM LACTATE AND CALCIUM CHLORIDE 150 ML/HR: 600; 310; 30; 20 INJECTION, SOLUTION INTRAVENOUS at 08:29

## 2023-11-04 RX ADMIN — ONDANSETRON 4 MG: 2 INJECTION INTRAMUSCULAR; INTRAVENOUS at 18:09

## 2023-11-04 RX ADMIN — FENTANYL CITRATE 50 MCG: 50 INJECTION, SOLUTION INTRAMUSCULAR; INTRAVENOUS at 16:09

## 2023-11-04 RX ADMIN — PROPOFOL 200 MG: 10 INJECTION, EMULSION INTRAVENOUS at 16:08

## 2023-11-04 RX ADMIN — Medication 100 MCG: at 16:18

## 2023-11-04 RX ADMIN — FENTANYL CITRATE 50 MCG: 50 INJECTION, SOLUTION INTRAMUSCULAR; INTRAVENOUS at 16:05

## 2023-11-04 RX ADMIN — PRENATAL VITAMINS-IRON FUMARATE 27 MG IRON-FOLIC ACID 0.8 MG TABLET 1 TABLET: at 18:12

## 2023-11-04 RX ADMIN — HYDROMORPHONE HYDROCHLORIDE 1 MG: 1 INJECTION, SOLUTION INTRAMUSCULAR; INTRAVENOUS; SUBCUTANEOUS at 20:02

## 2023-11-04 RX ADMIN — Medication 100 MCG: at 16:19

## 2023-11-04 RX ADMIN — SODIUM CHLORIDE, POTASSIUM CHLORIDE, SODIUM LACTATE AND CALCIUM CHLORIDE: 600; 310; 30; 20 INJECTION, SOLUTION INTRAVENOUS at 16:08

## 2023-11-04 RX ADMIN — SODIUM CHLORIDE 2 G: 900 INJECTION INTRAVENOUS at 16:11

## 2023-11-04 NOTE — ANESTHESIA PROCEDURE NOTES
Airway  Urgency: elective    Date/Time: 11/4/2023 4:09 PM  Airway not difficult    General Information and Staff    Patient location during procedure: OR  CRNA/CAA: Ruth Mcmahan CRNA    Indications and Patient Condition  Indications for airway management: airway protection    Preoxygenated: yes  MILS not maintained throughout  Mask difficulty assessment: 0 - not attempted    Final Airway Details  Final airway type: endotracheal airway      Successful airway: ETT  Cuffed: yes   Successful intubation technique: RSI  Facilitating devices/methods: intubating stylet  Endotracheal tube insertion site: oral  Blade: Landis  Blade size: 3  ETT size (mm): 7.0  Cormack-Lehane Classification: grade I - full view of glottis  Placement verified by: chest auscultation and capnometry   Measured from: lips  ETT/EBT  to lips (cm): 20  Number of attempts at approach: 1  Assessment: lips, teeth, and gum same as pre-op and atraumatic intubation    Additional Comments  Negative epigastric sounds, Breath sound equal bilaterally with symmetric chest rise and fall

## 2023-11-04 NOTE — OP NOTE
URETEROSCOPY LASER LITHOTRIPSY WITH STENT INSERTION  Procedure Note    Geraldine Enrique  11/4/2023    Pre-op Diagnosis:   left ureteral stone    Post-op Diagnosis:     Post-Op Diagnosis Codes:     * Left ureteral stone [N20.1]    Procedure/CPT® Codes:  AL CYSTO/URETERO W/LITHOTRIPSY &INDWELL STENT INSRT [29962]  CHG UROGRAPHY RETROGRADE WITH/WO KUB [65361]    Procedure(s):  CYSTOSCOPY, LEFT URETEROSCOPY, LASER LITHOTRIPSY WITH STENT INSERTION    Surgeon(s):  Vincenzo Zacarias MD    Anesthesia: see anesthesia record    Staff:   Circulator: Haase, Sherri L, RN  Laser Staff: Yamile Small RN  Scrub Person: Constanza Levi    Estimated Blood Loss: minimal  Urine Voided: * No values recorded between 11/4/2023  4:01 PM and 11/4/2023  4:54 PM *    Specimens:                ID Type Source Tests Collected by Time   1 :  Urine Urinary Bladder URINE CULTURE Vincenzo Zacarias MD 11/4/2023 1639         Drains: 6f x 24 cm JJ left ureteral stent ON STRING    Findings:  Cystoscopy with no masses or lesions noted within the walls of bladder  Ureteroscopy showed approximately 5 mm stone in the proximal ureter  Complete fragmentation of stone using 200 µm laser fiber  Pyeloscopy showed no further stone  Successful placement of 6 Syri[place] by 24 cm double-J left ureteral stent on string  Fluoroscope settings changed to small DR and 7.5 frame rate.      Blood: N/A    Complications: None immediate    Indication for Procedure: Ms. Enrique is a 27-year-old female who presented with an obstructing left 5 mm ureteral stone.  She had hydronephrosis and severe pain.  She is 25 weeks pregnant.  We discussed her options in regards to her stone management and she elected to move forward with ureteroscopy and laser lithotripsy today.    Description of Procedure: The patient was seen and examined in the preoperative area.  The risks, benefits, and alternatives were explained to the patient and family.  Informed consent was obtained.  The patient  was then taken back to the operating theater and placed on the table in a supine position.  Venodyne boots were placed on the bilateral lower extremities.  General anesthesia was induced without any complication.  They were then placed in the dorsal lithotomy position with all pressure points padded and secured.  The patient was prepped and draped in the usual sterile fashion and a timeout was taken.      A 22 Greek rigid cystoscope was advanced through the urethra and into the bladder.  Cystoscopy was performed in a systematic fashion there were no masses or lesions noted within the walls of the bladder.  Attention was then turned to the left ureteral orifice and a 0.035 sensor wire was advanced into the left renal pelvis.  Of note, we used very little fluoroscopy due to her pregnancy.  The bladder was then drained and a semirigid scope was advanced into the ureter and advanced until her stone was encountered.  Due to the angle of the ureter we were unable to reach the stone with the laser fiber with a semirigid scope.  A second wire was then placed in the semirigid scope was withdrawn.  A flexible ureteroscope was advanced over the wire until the stone was encountered.  Using a 200 µm laser fiber the stone was dusted in its entirety.  The scope was then advanced into the renal pelvis to ensure that the wire was in the correct position.  The wire was in the lower pole of the kidney.  A pyeloscopy was performed and another small stone was encountered this also was dusted into tiny fragments.  A completion pyeloscopy was then performed no further stone was identified.  The flexible scope was then withdrawn.  The existing wire was then backloaded into the cystoscope and a 6 Greek by 24 cm double-J ureteral stent was advanced into the left renal pelvis.  1 fluoroscopy shot was taken at this time to confirm placement.  The wire was pulled and the stent was deployed.  A final x-ray was then taken for positioning.  The  bladder was drained and she was given lidocaine jelly.  The patient tolerated the procedure well and there were no complications to the procedure.  She was taken to PACU in stable and extubated condition.    Disposition: The patient will be transferred to the floor when PACU criteria is met.  We will recommend keeping her on Ancef for now.  If she is to be discharged we will discharge her home on Keflex until her stent is out.   The intraoperative findings and postoperative plans were discussed with the patient and family.     Vincenzo Zacarias MD     Date: 11/4/2023  Time: 16:56 EDT

## 2023-11-04 NOTE — H&P
"[facility]  Obstetric History and Physical    Chief Complaint   Patient presents with    Flank Pain     LEFT FLANK PAIN THAT RADIATES AROUND TO FRONT OF ABDOMEN, SIMILAR TO LAST TIME SHE HAD KIDNEY STONE        Subjective     Patient is a 27 y.o. female  currently at 25w1d, who presents with complaints of left flank pain.  She states she first noticed symptoms for a brief time on Monday.  Symptoms returned at approximately 4 AM today.  Pain is described as constant and fairly intense.  It radiates from her left flank to her left groin.  She denies dysuria, fever/chills, nausea/vomiting, gross hematuria, vaginal bleeding or leakage of fluid.  She thought she may be having some contractions earlier but is not sure.  The patient does have a prior history of renal lithiasis with her last pregnancy.    Her prenatal care is benign to date. Her previous obstetric/gynecological history is notable for 2 prior vaginal deliveries and 1 spontaneous .  She has had a previous history of scoliosis surgery and did not have regional anesthetic with either delivery.    The following portions of the patients history were reviewed and updated as appropriate: current medications, allergies, past medical history, past surgical history, past family history, past social history, and problem list .        Prenatal Information:   Maternal Prenatal Labs  Blood Type No results found for: \"ABO\"   Rh Status No results found for: \"RH\"   Antibody Screen No results found for: \"ABSCRN\"   Gonnorhea No results found for: \"GCCX\"   Chlamydia No results found for: \"CLAMYDCU\"   RPR No results found for: \"RPR\"   Syphilis Antibody No results found for: \"SYPHILIS\"   Rubella No results found for: \"RUBELLAIGGIN\"   Hepatitis B Surface Antigen No results found for: \"HEPBSAG\"   HIV-1 Antibody No results found for: \"LABHIV1\"   Hepatitis C Antibody No results found for: \"HEPCAB\"   Rapid Urin Drug Screen No results found for: \"AMPMETHU\", " "\"BARBITSCNUR\", \"LABBENZSCN\", \"LABMETHSCN\", \"LABOPIASCN\", \"THCURSCR\", \"COCAINEUR\", \"AMPHETSCREEN\", \"PROPOXSCN\", \"BUPRENORSCNU\", \"METAMPSCNUR\", \"OXYCODONESCN\", \"TRICYCLICSCN\"   Group B Strep Culture No results found for: \"GBSANTIGEN\"           External Prenatal Results       Pregnancy Outside Results - Transcribed From Office Records - See Scanned Records For Details       Test Value Date Time    ABO  A  06/26/23 1548    Rh  Positive  06/26/23 1548    Antibody Screen  Negative  06/26/23 1548    Varicella IgG       Rubella  3.63 index 06/26/23 1548    Hgb  11.2 g/dL 11/04/23 0622       13.9 g/dL 06/26/23 1548       13.0 g/dL 05/22/23 1950    Hct  35.0 % 11/04/23 0622       41.3 % 06/26/23 1548       40.5 % 05/22/23 1950    Glucose Fasting GTT       Glucose Tolerance Test 1 hour       Glucose Tolerance Test 3 hour       Gonorrhea (discrete)  Negative  06/26/23 1548    Chlamydia (discrete)  Negative  06/26/23 1548    RPR  Non-Reactive  06/26/23 1548    VDRL       Syphilis Antibody       HBsAg  Non-Reactive  06/26/23 1548    Herpes Simplex Virus PCR       Herpes Simplex VIrus Culture       HIV  Non-Reactive  06/26/23 1548    Hep C RNA Quant PCR       Hep C Antibody  Non-Reactive  06/26/23 1548    AFP       Group B Strep ^ negative  03/28/19     GBS Susceptibility to Clindamycin       GBS Susceptibility to Erythromycin       Fetal Fibronectin       Genetic Testing, Maternal Blood                 Drug Screening       Test Value Date Time    Urine Drug Screen       Amphetamine Screen  Negative  06/26/23 1548    Barbiturate Screen  Negative  06/26/23 1548    Benzodiazepine Screen  Negative  06/26/23 1548    Methadone Screen  Negative  06/26/23 1548    Phencyclidine Screen  Negative  06/26/23 1548    Opiates Screen  Negative  06/26/23 1548    THC Screen  Negative  06/26/23 1548    Cocaine Screen       Propoxyphene Screen  Negative  06/26/23 1548    Buprenorphine Screen  Negative  06/26/23 1548    Methamphetamine Screen       " Oxycodone Screen  Negative  23 1548    Tricyclic Antidepressants Screen  Negative  23 1548              Legend    ^: Historical                              Past OB History:       OB History    Para Term  AB Living   4 2 2 0 1 2   SAB IAB Ectopic Molar Multiple Live Births   1 0 0 0 0 2      # Outcome Date GA Lbr Britton/2nd Weight Sex Delivery Anes PTL Lv   4 Current            3 SAB 2023           2 Term 19 38w0d  3810 g (8 lb 6.4 oz) M Vag-Spont Local N ANIKA      Name: Lonny      Apgar1: 8  Apgar5: 9   1 Term 17 39w3d 18:45 / 00:17 4300 g (9 lb 7.7 oz) M Vag-Spont None N ANIKA      Complications: Fetal macrosomia      Name: Chase      Apgar1: 8  Apgar5: 9      Obstetric Comments   2017 - Chase   2019 - Lonny       Past Medical History:  Past Medical History:   Diagnosis Date    Kidney stone complicating pregnancy 2016    Scoliosis 2006    surgically corrected at age 13-13 yo      Past Surgical History Past Surgical History:   Procedure Laterality Date    CERVICAL CHIARI DECOMPRESSION POSTERIOR      SPINAL GROWTH RODS      for Scoliosis    TONSILLECTOMY AND ADENOIDECTOMY        Family History: Family History   Problem Relation Age of Onset    Hypertension Mother     Diabetes Sister     Hypertension Maternal Grandmother       Social History:  reports that she has never smoked. She has never used smokeless tobacco.   reports no history of alcohol use.   reports no history of drug use.   Allergies: Morphine and related  Current Medications:          No current facility-administered medications on file prior to encounter.     Current Outpatient Medications on File Prior to Encounter   Medication Sig Dispense Refill    famotidine (Pepcid) 20 MG tablet Take 1 tablet by mouth 2 (Two) Times a Day As Needed for Heartburn.      ferrous sulfate 325 (65 FE) MG tablet Take 1 tablet by mouth Daily Before Lunch. 30 tablet 8    omeprazole (priLOSEC) 20 MG capsule Take 1 capsule by  mouth Daily.      prenatal vitamin (prenatal, CLASSIC, vitamin) tablet Take 1 tablet by mouth Daily.         General ROS: Pertinent items are noted in HPI, all other systems reviewed and negative    Objective       Vital Signs Range for the last 24 hours  Temperature: Temp:  [98.2 °F (36.8 °C)] 98.2 °F (36.8 °C)   Temp Source: Temp src: Oral   BP: BP: (119)/(83) 119/83   Pulse: Heart Rate:  [85] 85   Respirations: Resp:  [20] 20   SPO2: SpO2:  [97 %] 97 %   O2 Amount (l/min):     O2 Devices Device (Oxygen Therapy): room air   Weight: Weight:  [93 kg (205 lb)] 93 kg (205 lb)     Physical Examination: General appearance - alert, well appearing, and in no distress, oriented to person, place, and time, and overweight  Mental status - alert, oriented to person, place, and time, normal mood, behavior, speech, dress, motor activity, and thought processes  Eyes - pupils equal and reactive, extraocular eye movements intact, sclera anicteric  Neck - supple, no significant adenopathy, thyroid exam: thyroid is normal in size without nodules or tenderness  Chest - clear to auscultation, no wheezes, rales or rhonchi, symmetric air entry  Heart - normal rate, regular rhythm, normal S1, S2, no murmurs, rubs, clicks or gallops  Abdomen-soft, nontender, nondistended, no masses or organomegaly   Abdomen, Non-Tender  Pelvic - VULVA: normal appearing vulva with no masses, tenderness or lesions, CERVIX: Closed/long.  The cervix remains firm and posterior.  Neurological - alert, oriented, normal speech, no focal findings or movement disorder noted, DTR's normal and symmetric  Extremities - no pedal edema noted    Fetal Heart Rate Assessment  Indication: Left flank pain   Start Time: 0556                end Time: 0618   NST Results: Reactive NST.  Fetal heart rate baseline 130-135 bpm.  Moderate variability with 10 x 10 accelerations noted.  No decelerations.  No contractions seen.        Laboratory Results:   Lab Results    Component Value Date    ALKPHOS 67 11/04/2023    ALT 5 11/04/2023    AST 8 11/04/2023    CREATININE 0.59 11/04/2023    BILITOT 0.2 11/04/2023     04/06/2019    URICACID 4.4 04/06/2019       WBC   Date Value Ref Range Status   11/04/2023 9.19 3.40 - 10.80 10*3/mm3 Final     RBC   Date Value Ref Range Status   11/04/2023 4.17 3.77 - 5.28 10*6/mm3 Final     Hemoglobin   Date Value Ref Range Status   11/04/2023 11.2 (L) 12.0 - 15.9 g/dL Final     Hematocrit   Date Value Ref Range Status   11/04/2023 35.0 34.0 - 46.6 % Final     MCV   Date Value Ref Range Status   11/04/2023 83.9 79.0 - 97.0 fL Final     MCH   Date Value Ref Range Status   11/04/2023 26.9 26.6 - 33.0 pg Final     MCHC   Date Value Ref Range Status   11/04/2023 32.0 31.5 - 35.7 g/dL Final     RDW   Date Value Ref Range Status   11/04/2023 13.2 12.3 - 15.4 % Final     RDW-SD   Date Value Ref Range Status   11/04/2023 40.1 37.0 - 54.0 fl Final     MPV   Date Value Ref Range Status   11/04/2023 9.8 6.0 - 12.0 fL Final     Platelets   Date Value Ref Range Status   11/04/2023 204 140 - 450 10*3/mm3 Final             Brief Urine Lab Results  (Last result in the past 365 days)        Color   Clarity   Blood   Leuk Est   Nitrite   Protein   CREAT   Urine HCG        11/04/23 0649 Yellow   Cloudy   Moderate (2+)   Negative   Negative   30 mg/dL (1+)                     Radiology Review: CT of abdomen and pelvis confirms an obstructing 6 mm left ureteral stone with high-grade hydronephrosis.  There is also a nonobstructing left renal stone.  An incidental finding of hepatic steatosis was noted.  Other Studies: Urinalysis as above with moderate blood, 1+ proteinuria, 11-20 RBCs per high-power field and 6-10 WBCs per high-power field with 1+ bacteria.  3-6 squamous epithelial cells noted.    Assessment & Plan       * No active hospital problems. *        Assessment:  Left ureterolithiasis with high-grade obstruction complicating  "pregnancy.    Plan:  Admit.  Urology consultation.     Total time spent today with Geraldine  was 30-39 minutes (level 4).  Of this time, > 50% was spent face-to-face time coordinating care, answering her questions and counseling regarding pathophysiology of her presenting problem along with plans for any diagnostic work-up and treatment.        Tenzin Lancaster \"[place]\" SONIA Mihcel MD  11/4/2023  08:25 EDT    "

## 2023-11-04 NOTE — ANESTHESIA POSTPROCEDURE EVALUATION
Patient: Geraldine Enrique    Procedure Summary       Date: 11/04/23 Room / Location:  [facility]    Anesthesia Start: 1605 Anesthesia Stop: 1655    Procedure: CYSTOSCOPY, LEFT URETEROSCOPY, LASER LITHOTRIPSY WITH STENT INSERTION (Left) Diagnosis:       Left ureteral stone      (left ureteral stone)    Surgeons: Vincenzo Zacarias MD Provider: Chris Ramirez MD    Anesthesia Type: general ASA Status: 2            Anesthesia Type: general    Vitals  Vitals Value Taken Time   /76 11/04/23 1656   Temp 97.8 °F (36.6 °C) 11/04/23 1654   Pulse 93 11/04/23 1657   Resp 16 11/04/23 1654   SpO2 100 % 11/04/23 1657   Vitals shown include unfiled device data.        Post Anesthesia Care and Evaluation    Patient location during evaluation: PACU  Patient participation: complete - patient participated  Level of consciousness: awake and alert  Pain management: adequate    Airway patency: patent  Anesthetic complications: No anesthetic complications  PONV Status: none  Cardiovascular status: hemodynamically stable and acceptable  Respiratory status: nonlabored ventilation, acceptable and nasal cannula  Hydration status: acceptable    Comments: FHT to be performed by [facility] long

## 2023-11-04 NOTE — CONSULTS
Urology Hospital Consult Note     Date of Consult: 2023     Patient Name: Geraldine Enrique  MRN: [id number]   : 1995     Referring Provider: Louie Velazquez    Care Team: Patient Care Team:  Provider, No Known as PCP - Louie Castaneda MD as Obstetrician (Obstetrics and Gynecology)     Reason for Hospitalization: Nephrolithiasis    History of Present Illness: Was contacted for hospital consultation on Geraldine Enrique a 27 y.o. female who presents to the hospital for left flank pain. She is currently pregnant at 25 weeks 1 day (). CT Stone demonstrated a 6mm stone in the proximal left ureter with hydronephrosis. She reports first noticing flank pain Monday, but it became severe this afternoon. Her pain is improved with Dilaudid, but not completely resolved. She is afebrile and has stable vitals. SCR 0.59. WBC 9.19. UA 6-10 WBC, 1+ bacteria, 3-6 squamous cells. UCX pending.     She reports a history of nephrolithiasis during a prior pregnancy, during which she passed a smaller roughly 2-3mm stone. In the interim since her last pregnancy she passed several small stones without intervention. She has never followed outpatient with a Urologist. She denies a personal or family history of  malignancy.     Subjective      Pertinent items are noted in HPI.     Past Medical History:   Past Medical History:   Diagnosis Date    Kidney stone complicating pregnancy 2016    Scoliosis 2006    surgically corrected at age 13-15 yo       Past Surgical History:   Past Surgical History:   Procedure Laterality Date    CERVICAL CHIARI DECOMPRESSION POSTERIOR  2007    SPINAL GROWTH RODS  2007    for Scoliosis    TONSILLECTOMY AND ADENOIDECTOMY         Family History:   Family History   Problem Relation Age of Onset    Hypertension Mother     Diabetes Sister     Hypertension Maternal Grandmother        Social History:   Social History     Socioeconomic History    Marital  status:      Spouse name: MIHIR    Number of children: 2    Highest education level: Some college, no degree   Tobacco Use    Smoking status: Never    Smokeless tobacco: Never   Substance and Sexual Activity    Alcohol use: No    Drug use: No    Sexual activity: Yes     Partners: Male       Medications:     Current Facility-Administered Medications:     acetaminophen (TYLENOL) tablet 650 mg, 650 mg, Oral, Q4H PRN, Tenzin Michel III, MD    bisacodyl (DULCOLAX) suppository 10 mg, 10 mg, Rectal, Daily PRN, Tenzin Michel III, MD    calcium carbonate (TUMS) chewable tablet 500 mg (200 mg elemental), 2 tablet, Oral, TID PRN, Tenzin Michel III, MD    docusate sodium (COLACE) capsule 100 mg, 100 mg, Oral, BID PRN, Tenzni Michel III, MD    HYDROmorphone (DILAUDID) injection 0.5 mg, 0.5 mg, Intravenous, Q2H PRN, 0.5 mg at 11/04/23 1145 **AND** naloxone (NARCAN) injection 0.4 mg, 0.4 mg, Intravenous, Q5 Min PRN, Tenzin Michel III, MD    hydrOXYzine (ATARAX) tablet 50 mg, 50 mg, Oral, Nightly PRN, Tenzin Michel III, MD    lactated ringers infusion, 150 mL/hr, Intravenous, Continuous, Omari Pedroza MD, Last Rate: 150 mL/hr at 11/04/23 0829, 150 mL/hr at 11/04/23 0829    lactated ringers infusion, 125 mL/hr, Intravenous, Continuous, Tenzin Michel III, MD, Last Rate: 125 mL/hr at 11/04/23 1131, 125 mL/hr at 11/04/23 1131    lidocaine PF 1% (XYLOCAINE) injection 0.5 mL, 0.5 mL, Intradermal, Once PRN, Tenzin Michel III, MD    ondansetron (ZOFRAN) injection 4 mg, 4 mg, Intravenous, Q6H PRN, Omari Pedroza MD    ondansetron (ZOFRAN) tablet 8 mg, 8 mg, Oral, Q8H PRN **OR** ondansetron (ZOFRAN) injection 4 mg, 4 mg, Intravenous, Q8H PRN, Tenzin Michel III, MD    sodium chloride 0.9 % flush 10 mL, 10 mL, Intravenous, Q12H, Omari Pedroza MD    sodium chloride 0.9 % flush 10 mL, 10 mL, Intravenous, PRN, Omari Pedroza MD    sodium chloride 0.9 % flush 10 mL, 10 mL, Intravenous, Q12H,  "Tenzin Michel III, MD    sodium chloride 0.9 % flush 10 mL, 10 mL, Intravenous, PRN, Tenzin Michel III, MD    sodium chloride 0.9 % infusion 40 mL, 40 mL, Intravenous, PRN, Tenzin Michel III, MD    Allergies:   Allergies   Allergen Reactions    Morphine And Related Hives       Problem:   Patient Active Problem List   Diagnosis    Annual GYN exam w/o problem    Mixed hyperlipidemia - high LDL and low HDL    Prenatal care, subsequent pregnancy    Low-lying placenta without hemorrhage, third trimester    Heartburn in pregnancy    Kidney stone complicating pregnancy, second trimester       Review of Systems   All systems were reviewed and negative except for: no pertinent      Objective     Physical Exam:  Vital Signs:   Temp:  [97.9 °F (36.6 °C)-98.2 °F (36.8 °C)] 97.9 °F (36.6 °C)  Heart Rate:  [77-85] 77  Resp:  [16-20] 16  BP: (119-121)/(72-83) 121/72  93 kg (205 lb)  Body mass index is 33.09 kg/m².       GEN: NAD, alert and oriented  HEENT: NCAT, EOMI  RESP: Equal bilateral chest rise, normal work of breathing  CV: Regular rate, appears well perfused  ABD: Gravid, non-tender, non-distended  Ext: No gross deformities, normal ROM  MSK: Full ROM in the bilateral upper extremities  NEURO: No focal deficits  PSYCH: Normal mood and affect      Genitourinary  Left CVA tenderness    No intake/output data recorded.    Labs  Lab Results   Component Value Date    GLUCOSE 93 11/04/2023    CALCIUM 8.4 (L) 11/04/2023     11/04/2023    K 3.8 11/04/2023    CO2 21.0 (L) 11/04/2023     11/04/2023    BUN 6 11/04/2023    CREATININE 0.59 11/04/2023    EGFRIFAFRI >60 09/22/2021    EGFRIFNONA >60 09/22/2021    BCR 10.2 11/04/2023    ANIONGAP 11.0 11/04/2023       Lab Results   Component Value Date    WBC 9.19 11/04/2023    HGB 11.2 (L) 11/04/2023    HCT 35.0 11/04/2023    MCV 83.9 11/04/2023     11/04/2023       No results found for: \"URINECX\"    Brief Urine Lab Results  (Last result in the past 365 days) "        Color   Clarity   Blood   Leuk Est   Nitrite   Protein   CREAT   Urine HCG        11/04/23 0649 Yellow   Cloudy   Moderate (2+)   Negative   Negative   30 mg/dL (1+)                   Result Review    Result Review:  I have personally reviewed the results from the time of this admission to 11/4/2023 12:17 EDT and agree with these findings:  [x]  Laboratory  []  Microbiology  [x]  Radiology  []  EKG/Telemetry   []  Cardiology/Vascular   []  Pathology  [x]  Old records  []  Other:  Most notable findings include:   Scr 0.59, WBC 9.19  UA: 6-10 WBC, 1+ bacteria, nitrite negative 3-6 squames. UCX pending    Imaging Results (Last 72 Hours)       Procedure Component Value Units Date/Time    CT Abdomen Pelvis Without Contrast [[id number]] Collected: 11/04/23 0942     Updated: 11/04/23 0954    Narrative:      CT ABDOMEN PELVIS WO CONTRAST    Date of Exam: 11/4/2023 9:27 AM EDT    Indication: Flank Pain R/O Calculi.    Comparison: None available.    Technique: Axial CT images were obtained of the abdomen and pelvis without the administration of contrast. Reconstructed coronal and sagittal images were also obtained. Automated exposure control and iterative construction methods were used.      Findings:  The lung bases are clear.    There is hepatic steatosis. The unenhanced gallbladder, adrenal glands, spleen, and pancreas are unremarkable. There is a nonobstructing left renal stone. There is a 6 mm obstructing stone within the mid left ureter with moderate left hydronephrosis.    The stomach appears normal. The small bowel appears normal in caliber and configuration. The colon appears normal. The appendix appears normal. There is no ascites or loculated collection. No abnormally enlarged lymph nodes are identified.    The rectum and urinary bladder are unremarkable. An intrauterine gestation is identified.    No aggressive osseous lesions are identified. There is posterior fusion hardware through within the visualized  lower thoracic and the upper lumbar spine.      Impression:      Impression:  1.6 mm obstructing stone within mid left ureter, with moderate left hydronephrosis  2.Nonobstructing left renal stone.  3.Hepatic steatosis.            Electronically Signed: James Call MD    2023 9:50 AM EDT    Workstation ID: [id number]            Assessment / Plan      Assessment/Plan:    27 pregnant female (25 weeks 1 day, ). Presents with 6mm stone in the proximal left ureter with hydronephrosis. She is afebrile and her vitals are stable. UA is not concerning for UTI and may represent contamination. We discussed that at 6mm this stone is unlikely to pass without intervention. Given her severe pain requiring hospital admission we would offer ureteroscopy with laser lithotripsy to treat and remove the stone. We discussed ureteral stent placement, but explained that she would still require ureteroscopy after delivery, and many patients have persistent urinary symptoms with ureteral stent in place. Ureteroscopy has been demonstrated safe within the second trimester of pregnancy with the benefit of avoiding long-term ureteral stent placement which is often very uncomfortable or associated with infection. If the stone were left untreated she would be at risk for recurrent hospitalizations and narcotic pain medication for pain, or in severe cases UTI with urosepsis. Additionally, if we monitored the stone and she developed a complication within the third trimester then surgery would be higher risk. After a discussion of risk and benefits with her and her  we will plan to proceed with cystoscopy, left URS w/LL and possible ureteral stent placement. We will also start tamsulosin to help with discomfort with the temporary ureteral stent.     -tamsulosin  -To OR for left URS w/ LL w/ possible ureteral stent placement    I discussed the patients findings and my recommendations with the patient.     Time: Total  face-to-face/floor time. 45    Eleazar Landeros Jr, MD   11/04/23  12:17 EDT

## 2023-11-05 VITALS
SYSTOLIC BLOOD PRESSURE: 116 MMHG | TEMPERATURE: 98.4 F | BODY MASS INDEX: 32.95 KG/M2 | HEART RATE: 90 BPM | WEIGHT: 205 LBS | RESPIRATION RATE: 16 BRPM | HEIGHT: 66 IN | OXYGEN SATURATION: 91 % | DIASTOLIC BLOOD PRESSURE: 77 MMHG

## 2023-11-05 LAB
BACTERIA SPEC AEROBE CULT: NO GROWTH
BACTERIA SPEC AEROBE CULT: NO GROWTH

## 2023-11-05 PROCEDURE — 25010000002 CEFAZOLIN PER 500 MG: Performed by: UROLOGY

## 2023-11-05 PROCEDURE — 99232 SBSQ HOSP IP/OBS MODERATE 35: CPT | Performed by: UROLOGY

## 2023-11-05 PROCEDURE — 99238 HOSP IP/OBS DSCHRG MGMT 30/<: CPT | Performed by: OBSTETRICS & GYNECOLOGY

## 2023-11-05 RX ORDER — DOCUSATE SODIUM 100 MG/1
100 CAPSULE, LIQUID FILLED ORAL 2 TIMES DAILY
Status: DISCONTINUED | OUTPATIENT
Start: 2023-11-05 | End: 2023-11-05 | Stop reason: HOSPADM

## 2023-11-05 RX ORDER — CEPHALEXIN 500 MG/1
500 CAPSULE ORAL 2 TIMES DAILY
Qty: 14 CAPSULE | Refills: 0 | Status: SHIPPED | OUTPATIENT
Start: 2023-11-05 | End: 2023-11-12

## 2023-11-05 RX ADMIN — DOCUSATE SODIUM 100 MG: 100 CAPSULE, LIQUID FILLED ORAL at 08:45

## 2023-11-05 RX ADMIN — PRENATAL VITAMINS-IRON FUMARATE 27 MG IRON-FOLIC ACID 0.8 MG TABLET 1 TABLET: at 08:45

## 2023-11-05 RX ADMIN — TAMSULOSIN HYDROCHLORIDE 0.4 MG: 0.4 CAPSULE ORAL at 09:35

## 2023-11-05 RX ADMIN — SODIUM CHLORIDE 2000 MG: 900 INJECTION INTRAVENOUS at 05:51

## 2023-11-05 RX ADMIN — DOCUSATE SODIUM 100 MG: 100 CAPSULE, LIQUID FILLED ORAL at 05:51

## 2023-11-05 RX ADMIN — OXYCODONE HYDROCHLORIDE AND ACETAMINOPHEN 1 TABLET: 5; 325 TABLET ORAL at 08:45

## 2023-11-05 RX ADMIN — OXYCODONE HYDROCHLORIDE AND ACETAMINOPHEN 1 TABLET: 5; 325 TABLET ORAL at 00:25

## 2023-11-05 NOTE — PROGRESS NOTES
[facility]   Urology Progress Note    Patient Name: Geraldine Enrique  : 1995  MRN: [id number]  Date of admission: 2023  Surgical Procedures Since Admission:  Procedure(s):  CYSTOSCOPY, LEFT URETEROSCOPY, LASER LITHOTRIPSY WITH STENT INSERTION  Surgeon:  Vincenzo Zacarias MD  Status:  1 Day Post-Op  -------------------    Subjective   Subjective     Chief Complaint: left ureteral stone    Flank Pain       No acute events overnight.  Pt with complaints of mild bladder spasm.  No dysuria.  No flank pain or discomfort.  No gross hematuria.    Overall, she feels well.         Objective   Objective     Vitals:   Temp:  [97.4 °F (36.3 °C)-98.9 °F (37.2 °C)] 98.4 °F (36.9 °C)  Heart Rate:  [] 109  Resp:  [16-17] 16  BP: (110-148)/(59-86) 148/79  Flow (L/min):  [2] 2  Output by Drain (mL) 23 0701 - 23 1900 23 1901 - 23 0700 23 0701 - 23 0949 Range Total   Requested LDAs do not have output data documented.       Physical Exam  Vitals and nursing note reviewed. Exam conducted with a chaperone present.   Constitutional:       General: She is awake. She is not in acute distress.     Appearance: Normal appearance.   HENT:      Head: Normocephalic and atraumatic.      Right Ear: External ear normal.      Left Ear: External ear normal.      Nose: Nose normal.   Eyes:      Conjunctiva/sclera: Conjunctivae normal.   Pulmonary:      Effort: Pulmonary effort is normal. No respiratory distress.   Abdominal:      General: Abdomen is flat. There is no distension.      Palpations: Abdomen is soft. There is no mass.      Tenderness: There is no abdominal tenderness. There is no right CVA tenderness, left CVA tenderness, guarding or rebound.      Hernia: No hernia is present.      Comments: Gravid     Genitourinary:     Exam position: Lithotomy position.   Skin:     General: Skin is warm.   Neurological:      General: No focal deficit present.      Mental Status: She is alert  and oriented to person, place, and time.      Gait: Gait normal.   Psychiatric:         Behavior: Behavior normal. Behavior is cooperative.         Thought Content: Thought content normal.         Judgment: Judgment normal.               Result Review    Result Review:  I have personally reviewed the results from the time of this admission to 11/5/2023 09:49 EST and agree with these findings:  []  Laboratory  []  Microbiology  []  Radiology  []  EKG/Telemetry   []  Cardiology/Vascular   []  Pathology  []  Old records  []  Other:  Most notable findings include: Urine culture not resulted as yet    Assessment & Plan   Assessment / Plan     Brief Patient Summary:  Geraldine Enrique is a 27 y.o. female who underwent ureteroscopy and laser lithotripsy yesterday.  She has done well and is tolerating her stent as expected.  She has bladder spasms but no dysuria.      Active Hospital Problems:  Active Hospital Problems    Diagnosis     **Kidney stone complicating pregnancy, second trimester      Plan:    OK to d/c from urologic standpoint  Continue Keflex 4 more days (through Wednesday)  She was instructed to remove her stent on Wednesday morning  We will arrange follow up in 2 weeks.     Please call with any questions.

## 2023-11-05 NOTE — DISCHARGE SUMMARY
Admission date: 11/4/2023  Discharge date: 11/05/23    Referring Provider: Louie Velazquez MD    Admission diagnosis:  Kidney stone complicating pregnancy, second trimester [O26.832, N20.0]      Kidney stone complicating pregnancy, second trimester       Discharge diagnosis:  Kidney stone complicating pregnancy    Consultants:  Urology    Hospital course:  The patient was admitted to antepartum due to obstructed kidney stone.  She underwent surgical extraction by urology and has felt well since.  She is doing well today and has been without contractions since surgery. Will DC home today on abx.       Vitals:    11/05/23 0820   BP: 148/79   Pulse: 109   Resp: 16   Temp: 98.4 °F (36.9 °C)   SpO2:        GENERAL:  Well-developed, well-nourished in no acute distress.   ABD:  Gravid  SVE:  FHT's  EXTREMITIES:  No clubbing, cyanosis or edema.  PSYCHIATRIC:  Normal affect and mood.      Discharge condition: stable  Discharge diet   Dietary Orders (From admission, onward)       Start     Ordered    11/04/23 1752  Diet: Regular/House Diet; Texture: Regular Texture (IDDSI 7); Fluid Consistency: Thin (IDDSI 0)  Diet Effective Now        References:    Diet Order Crosswalk   Question Answer Comment   Diets: Regular/House Diet    Texture: Regular Texture (IDDSI 7)    Fluid Consistency: Thin (IDDSI 0)        11/04/23 1751                  Additional Instructions: Call with fevers, uncontrolled nausea/vomiting/pain.  Medications:      Discharge Medications        New Medications        Instructions Start Date   cephalexin 500 MG capsule  Commonly known as: Keflex   500 mg, Oral, 2 Times Daily             Continue These Medications        Instructions Start Date   famotidine 20 MG tablet  Commonly known as: Pepcid   20 mg, Oral, 2 Times Daily PRN      ferrous sulfate 325 (65 FE) MG tablet   325 mg, Oral, Daily Before Lunch      omeprazole 20 MG capsule  Commonly known as: priLOSEC   20 mg, Oral, Daily      prenatal (CLASSIC)  vitamin 28-0.8 MG tablet tablet  Generic drug: prenatal vitamin   1 tablet, Oral, Daily             Disposition:Home or Self Care  Follow up:   Future Appointments   Date Time Provider Department Center   11/10/2023 10:30 AM [facility] US 1 BH [facility]   11/10/2023 11:20 AM Louie Velazquez MD [facility]       Over 30 minutes on discharge.  Counseling and coordinating care.    Deni Lira MD

## 2023-11-05 NOTE — PROGRESS NOTES
Daily Progress Note    Patient name: Geraldine Enrique  YOB: 1995   MRN: [id number]  Admission Date: 2023  Date of Service: 2023  Referring Provider: Louie Velazquez MD    Geraldine Enrique is a 27 y.o.    at 25w2d  admitted on 2023 for Kidney stone complicating pregnancy, second trimester    Hospital day 1      Diagnoses:   Patient Active Problem List    Diagnosis     *Kidney stone complicating pregnancy, second trimester [O26.832, N20.0]     Low-lying placenta without hemorrhage, third trimester [O44.43]     Heartburn in pregnancy [O26.899, R12]     Prenatal care, subsequent pregnancy [Z34.80]     Mixed hyperlipidemia - high LDL and low HDL [E78.2]     Annual GYN exam w/o problem [Z01.419]        Chief Complaint:  Chief Complaint   Patient presents with    Flank Pain     LEFT FLANK PAIN THAT RADIATES AROUND TO FRONT OF ABDOMEN, SIMILAR TO LAST TIME SHE HAD KIDNEY STONE        Subjective:      Geraldine has no complaints today.  Reports fetal movement is normal  Denies leakage of amniotic fluid.  Denies vaginal bleeding    Objective:     Vital signs:  Temp:  [97.4 °F (36.3 °C)-98.9 °F (37.2 °C)] 98.4 °F (36.9 °C)  Heart Rate:  [] 109  Resp:  [16-17] 16  BP: (110-148)/(59-86) 148/79    Abdomen: soft, nontender  Uterus: gravid, nontender  Extremities: nontender; no edema        Non-Stress Test:    Fetal Heart Rate Assessment   Method: Fetal HR Assessment Method: external   Beats/min: Fetal HR (beats/min): 135   Baseline: Fetal HR Baseline: normal range   Variability: Fetal HR Variability: moderate (amplitude range 6 to 25 bpm)   Accels: Fetal HR Accelerations: greater than/equal to 10 bpm (32 wks gest or less), lasts at least 10 seconds (32 wks gest or less)   Decels: Fetal HR Decelerations: absent   Tracing Category:       Uterine Assessment   Method: Method: external tocotransducer   Frequency (min):     Ctx Count in 10 min:     Duration:     Intensity:  Contraction Intensity: no contractions   Intensity by IUPC:     Resting Tone: Uterine Resting Tone: soft by palpation   Resting Tone by IUPC:     [place] Units:       Cervix: Exam by: Method: sterile exam per physician   Dilation:     Effacement: Cervical Effacement: 0%   Station:         Medications:  ceFAZolin, 2,000 mg, Intravenous, Q8H  docusate sodium, 100 mg, Oral, BID  ferrous sulfate, 325 mg, Oral, Daily Before Lunch  prenatal vitamin, 1 tablet, Oral, Daily  sodium chloride, 10 mL, Intravenous, Q12H  tamsulosin, 0.4 mg, Oral, Daily         docusate sodium    HYDROmorphone    ondansetron    ondansetron **OR** ondansetron    oxyCODONE-acetaminophen    sodium chloride    Labs:  Lab Results (last 24 hours)       Procedure Component Value Units Date/Time    Urine Culture - Urine, Urinary Bladder [[id number]] Collected: 23 163    Specimen: Urine from Urinary Bladder Updated: 23 174          Lab Results   Component Value Date    HGB 11.2 (L) 2023         Assessment/Plan:      Geraldine is a 27 y.o.    at 25w2d.  1. Kidney stone complicating pregnancy, second trimester: feeling better following surgical intervention. No contractions overnight and pan well controlled. Discussed precautions and will DC home today.   .  All questions were answered to the best my ability.    Deni Lira MD  2023

## 2023-11-08 ENCOUNTER — TELEPHONE (OUTPATIENT)
Dept: UROLOGY | Facility: CLINIC | Age: 28
End: 2023-11-08
Payer: COMMERCIAL

## 2023-11-08 DIAGNOSIS — N20.1 URETERAL STONE: Primary | ICD-10-CM

## 2023-11-08 RX ORDER — TAMSULOSIN HYDROCHLORIDE 0.4 MG/1
1 CAPSULE ORAL DAILY
Qty: 14 CAPSULE | Refills: 0 | Status: SHIPPED | OUTPATIENT
Start: 2023-11-08 | End: 2023-11-22

## 2023-11-08 NOTE — TELEPHONE ENCOUNTER
Patient had surgery on 11/04/23 - CYSTOSCOPY, LEFT URETEROSCOPY, LASER LITHOTRIPSY WITH STENT INSERTION.      Patient was instructed to take the sent today, pt took stent out this morning and now is in sever pain.    Patient is pregnant - almost 26 weeks.    Patient having Left Flank pain  Patient pain level is at a 7-8.  Patient has taken motrin, tylenol and has help with pain  Patient has used a heating pad, did not help with pain    No issues voiding,   No dysuria    Patient was prescribed Keflex for 7 days - pt would like to know does she stop the antibiotic or completes the 7 days?    Please advise, thank you Dr. Zacarias.

## 2023-11-08 NOTE — TELEPHONE ENCOUNTER
Called patient because she had pain after stent removal.  She reports since her stent was removed her pain has been severe.    She reports she has been able to control her pain somewhat with tylenol.    I discussed her options and she will wait at home for now.  If her pain becomes severe she will come back to the ER.    We will plan on ultrasound to assess for hydronephrosis if she comes back to the ER.     I have sent in a Rx for tamsulosin.

## 2023-11-09 ENCOUNTER — TELEPHONE (OUTPATIENT)
Dept: OBSTETRICS AND GYNECOLOGY | Facility: CLINIC | Age: 28
End: 2023-11-09
Payer: COMMERCIAL

## 2023-11-09 NOTE — TELEPHONE ENCOUNTER
Agree.  Prescription for Terazol sent to pharmacy.    New Medications Ordered This Visit   Medications    terconazole (TERAZOL 3) 0.8 % vaginal cream     Sig: Insert 1 applicator into the vagina Every Night for 3 days.     Dispense:  3 each     Refill:  0

## 2023-11-09 NOTE — TELEPHONE ENCOUNTER
Called and spoke with patient and informed her of Rx being called in, she verified understanding.

## 2023-11-09 NOTE — TELEPHONE ENCOUNTER
Informed pt of what Dr. Zacarias is recommending, regarding L flank pain:  Complete the antibiotic.  If she is having severe pain she may need to return to the ER.  Advise her to take some pain medication.  It should resolve on its own if she is having a spasm.

## 2023-11-10 ENCOUNTER — ROUTINE PRENATAL (OUTPATIENT)
Dept: OBSTETRICS AND GYNECOLOGY | Facility: CLINIC | Age: 28
End: 2023-11-10
Payer: COMMERCIAL

## 2023-11-10 VITALS — WEIGHT: 205 LBS | SYSTOLIC BLOOD PRESSURE: 126 MMHG | BODY MASS INDEX: 33.09 KG/M2 | DIASTOLIC BLOOD PRESSURE: 72 MMHG

## 2023-11-10 DIAGNOSIS — Z71.85 VACCINE COUNSELING: ICD-10-CM

## 2023-11-10 DIAGNOSIS — O26.832 KIDNEY STONE COMPLICATING PREGNANCY, SECOND TRIMESTER: ICD-10-CM

## 2023-11-10 DIAGNOSIS — Z34.82 PRENATAL CARE, SUBSEQUENT PREGNANCY IN SECOND TRIMESTER: Primary | ICD-10-CM

## 2023-11-10 DIAGNOSIS — N20.0 KIDNEY STONE COMPLICATING PREGNANCY, SECOND TRIMESTER: ICD-10-CM

## 2023-11-10 PROBLEM — O44.43 LOW-LYING PLACENTA WITHOUT HEMORRHAGE, THIRD TRIMESTER: Status: RESOLVED | Noted: 2023-10-10 | Resolved: 2023-11-10

## 2023-11-10 NOTE — PROGRESS NOTES
Chief Complaint   Patient presents with    Routine Prenatal Visit       HPI: Geraldine is a  currently at 26w0d who today reports the following:  Contractions - No; Leaking - No; Vaginal bleeding - No; Heartburn - No.    ROS:  GI: Nausea - No ; Constipation - No; Diarrhea - No    Neuro: Headache - No ; Visual change - No    Respiratory: Cough - No; SOB - No; fever - No     EXAM:  Vitals: See prenatal flowsheet   Abdomen: See prenatal flowsheet   Urine glucose/protein: See prenatal flowsheet   Pelvic: See prenatal flowsheet     Lab Results   Component Value Date    ABO A 2023    RH Positive 2023    ABSCRN Negative 2023       MDM:  Impression: Supervision of high risk pregnancy  Recent cysto with stents for stones - stent is now out   Tests ordered/done today: U/S to complete the anatomic screening - anatomy completely seen today   Counseling: Prenatal labs reviewed  Need to continue with PNV's  COVID vaccine recommended at any gestational age  Flu vaccine recommended at any gestational age   Tests ordered today for her next visit:   GCT  HgB

## 2023-11-15 ENCOUNTER — OFFICE VISIT (OUTPATIENT)
Dept: UROLOGY | Facility: CLINIC | Age: 28
End: 2023-11-15
Payer: COMMERCIAL

## 2023-11-15 VITALS
BODY MASS INDEX: 32.95 KG/M2 | HEIGHT: 66 IN | OXYGEN SATURATION: 98 % | DIASTOLIC BLOOD PRESSURE: 84 MMHG | SYSTOLIC BLOOD PRESSURE: 106 MMHG | WEIGHT: 205 LBS | HEART RATE: 77 BPM

## 2023-11-15 DIAGNOSIS — N20.1 URETERAL STONE: Primary | ICD-10-CM

## 2023-11-15 LAB
BILIRUB BLD-MCNC: NEGATIVE MG/DL
CLARITY, POC: CLEAR
COLOR UR: YELLOW
EXPIRATION DATE: ABNORMAL
GLUCOSE UR STRIP-MCNC: NEGATIVE MG/DL
KETONES UR QL: NEGATIVE
LEUKOCYTE EST, POC: ABNORMAL
Lab: ABNORMAL
NITRITE UR-MCNC: NEGATIVE MG/ML
PH UR: 7 [PH] (ref 5–8)
PROT UR STRIP-MCNC: ABNORMAL MG/DL
RBC # UR STRIP: NEGATIVE /UL
SP GR UR: 1.01 (ref 1–1.03)
UROBILINOGEN UR QL: NORMAL

## 2023-11-15 NOTE — PROGRESS NOTES
Office Note Kidney Stone      Patient Name: Geraldine Enrique  : 1995   MRN: [id number]     Chief Complaint: History of Kidney Stones.    Chief Complaint   Patient presents with    kidney stonee       Referring Provider: No ref. provider found    History of Present Illness: Geraldine Enrique is a 27 y.o. female who presents today for history of Kidney Stones.  She underwent ureteroscopy and laser lithotripsy on 23 for a 5 mm stone in the proximal left ureteral stone.  She has removed her stent.  She had two days of pain after stent removal but that has resolved.    She reports she routinely gets kidney stones but has never had a metabolic stone work up.   This was her first time needing surgery.      She is due .  She will be in 3rd trimester in 2 weeks.       Subjective      Review of System:   Review of Systems   Constitutional: Negative.    HENT: Negative.     Eyes: Negative.    Respiratory: Negative.     Cardiovascular: Negative.    Gastrointestinal: Negative.    Endocrine: Negative.    Genitourinary: Negative.    Musculoskeletal: Negative.    Skin: Negative.    Allergic/Immunologic: Negative.    Neurological: Negative.    Hematological: Negative.    Psychiatric/Behavioral: Negative.        I have reviewed the ROS documented by my clinical staff, I have updated appropriately and I agree. Vincenzo Zacarias MD    Past Medical History:   Past Medical History:   Diagnosis Date    Kidney stone complicating pregnancy     and     Scoliosis 2006    surgically corrected at age 13-15 yo       Past Surgical History:   Past Surgical History:   Procedure Laterality Date    CERVICAL CHIARI DECOMPRESSION POSTERIOR  2007    CYSTOSCOPY URETEROSCOPY LASER LITHOTRIPSY Left 2023    SPINAL GROWTH RODS      for Scoliosis    TONSILLECTOMY AND ADENOIDECTOMY      URETEROSCOPY LASER LITHOTRIPSY WITH STENT INSERTION Left 2023    Procedure: CYSTOSCOPY, LEFT URETEROSCOPY,  LASER LITHOTRIPSY WITH STENT INSERTION;  Surgeon: Vincenzo Zacarias MD;  [facility]: [facility];  Service: Urology;  Laterality: Left;       Family History:   Family History   Problem Relation Age of Onset    Hypertension Mother     Diabetes Sister     Hypertension Maternal Grandmother        Social History:   Social History     Socioeconomic History    Marital status:      Spouse name: MIHIR    Number of children: 2    Highest education level: Some college, no degree   Tobacco Use    Smoking status: Never     Passive exposure: Never    Smokeless tobacco: Never   Vaping Use    Vaping Use: Never used   Substance and Sexual Activity    Alcohol use: No    Drug use: No    Sexual activity: Yes     Partners: Male       Medications:     Current Outpatient Medications:     famotidine (Pepcid) 20 MG tablet, Take 1 tablet by mouth 2 (Two) Times a Day As Needed for Heartburn., Disp: , Rfl:     ferrous sulfate 325 (65 FE) MG tablet, Take 1 tablet by mouth Daily Before Lunch., Disp: 30 tablet, Rfl: 8    omeprazole (priLOSEC) 20 MG capsule, Take 1 capsule by mouth Daily., Disp: , Rfl:     prenatal vitamin (prenatal, CLASSIC, vitamin) tablet, Take 1 tablet by mouth Daily., Disp: , Rfl:     tamsulosin (FLOMAX) 0.4 MG capsule 24 hr capsule, Take 1 capsule by mouth Daily for 14 days. (Patient not taking: Reported on 11/15/2023), Disp: 14 capsule, Rfl: 0    Allergies:   Allergies   Allergen Reactions    Morphine And Related Hives       IPSS Questionnaire (AUA-7):  Bladder & Bowel Symptom Questionnaire    How often do you usually urinate during the day ?   2 - About every 2-3 hours    2.   How many timed do you urinate at night?   1 - About every 3-4 hours   3.   What is the reason that you usually urinate?   1 - About every 3-4 hours   4.   Once you get the urge to go, how long can you     comfortably delay?   1 - About every 3-4 hours   5.   How often do you get a sudden urge that makes you rush to the bathroom?   1 - About every  "3-4 hours   6.   How often does a sudden urge to urinate result in you leaking urine or wetting pads?   1 - About every 3-4 hours   7.  In your opinion, how good is your bladder control?   1 - About every 3-4 hours   8.  Do you have accidental bowel leakage?   no   9.  Do you have difficulty fully emptying your bladder?   no   10.  Do you experience accidental leakage when performing some physical activity such as coughing, sneezing, laughing or exercise?   no   11. Have you tried medications to help improve your symptoms?   no   12. Would you be interested in learning about a long-lasting option that may help you with your symptoms?   no                                                                             Total Score   8         Objective     Physical Exam:   Vital Signs:   Vitals:    11/15/23 1025   BP: 106/84   Pulse: 77   SpO2: 98%   Weight: 93 kg (205 lb)   Height: 167.6 cm (65.98\")   PainSc: 0-No pain     Body mass index is 33.1 kg/m².     Physical Exam  Vitals and nursing note reviewed. Exam conducted with a chaperone present.   Constitutional:       General: She is awake. She is not in acute distress.     Appearance: Normal appearance.   HENT:      Head: Normocephalic and atraumatic.      Right Ear: External ear normal.      Left Ear: External ear normal.      Nose: Nose normal.   Eyes:      Conjunctiva/sclera: Conjunctivae normal.   Pulmonary:      Effort: Pulmonary effort is normal. No respiratory distress.   Abdominal:      General: Abdomen is flat. There is no distension.      Palpations: Abdomen is soft. There is no mass.      Tenderness: There is no abdominal tenderness. There is no right CVA tenderness, left CVA tenderness, guarding or rebound.      Hernia: No hernia is present.   Genitourinary:     Exam position: Lithotomy position.   Skin:     General: Skin is warm.   Neurological:      General: No focal deficit present.      Mental Status: She is alert and oriented to person, place, and " "time.      Gait: Gait normal.   Psychiatric:         Behavior: Behavior normal. Behavior is cooperative.         Thought Content: Thought content normal.         Judgment: Judgment normal.         Labs:   Brief Urine Lab Results  (Last result in the past 365 days)        Color   Clarity   Blood   Leuk Est   Nitrite   Protein   CREAT   Urine HCG        11/04/23 0649 Yellow   Cloudy   Moderate (2+)   Negative   Negative   30 mg/dL (1+)                   Urine Culture          6/26/2023    15:48 11/4/2023    06:49 11/4/2023    16:39   Urine Culture   Urine Culture No growth  No growth  No growth         Color, UA   Date Value Ref Range Status   11/04/2023 Yellow Yellow, Straw Final   09/22/2021 Yellow  Final     External Ketones, Urine   Date Value Ref Range Status   09/22/2021 Negative Negative mg/dL Final     Bilirubin, UA   Date Value Ref Range Status   11/04/2023 Negative Negative Final   09/22/2021 Negative Negative Final     Urobilinogen, UA   Date Value Ref Range Status   11/04/2023 0.2 E.U./dL 0.2 - 1.0 E.U./dL Final     Leukocytes, UA   Date Value Ref Range Status   09/22/2021 Negative Negative Final     Leuk Esterase, UA   Date Value Ref Range Status   11/04/2023 Negative Negative Final       Lab Results   Component Value Date    GLUCOSE 93 11/04/2023    CALCIUM 8.4 (L) 11/04/2023     11/04/2023    K 3.8 11/04/2023    CO2 21.0 (L) 11/04/2023     11/04/2023    BUN 6 11/04/2023    CREATININE 0.59 11/04/2023    EGFRIFAFRI >60 09/22/2021    EGFRIFNONA >60 09/22/2021    BCR 10.2 11/04/2023    ANIONGAP 11.0 11/04/2023       Lab Results   Component Value Date    WBC 9.19 11/04/2023    HGB 11.2 (L) 11/04/2023    HCT 35.0 11/04/2023    MCV 83.9 11/04/2023     11/04/2023       Stone Analysis  No components found for: \"BWLIL2PLHAXY\", \"LXARB4STURDB\", \"[id number]\"    Images:   CT Abdomen Pelvis Without Contrast    Result Date: 11/4/2023  Impression: 1.6 mm obstructing stone within mid left ureter, " with moderate left hydronephrosis 2.Nonobstructing left renal stone. 3.Hepatic steatosis. Electronically Signed: James Call MD  11/4/2023 9:50 AM EDT  Workstation ID: [id number]      Measures:   Tobacco:   Geraldine Enrique  reports that she has never smoked. She has never been exposed to tobacco smoke. She has never used smokeless tobacco..      Urine Incontinence: Patient reports that she is not currently experiencing any symptoms of urinary incontinence.         Assessment / Plan      Assessment/Plan:   Geraldine Enrique is a 27 y.o. female who presented today for kidney stones. She removed her stent and has now done much better.  We discussed stone prevention strategies during pregnancy.  I will plan on a metabolic stone work up after pregnancy and breast feeding.     Diagnoses and all orders for this visit:    1. Ureteral stone (Primary)        Patient Education:   1. Fluid intake goal ~ 2.5L per day.  2.  Maintain a normal dietary calcium intake.  Take any calcium supplements with food.     Follow Up:   Return in about 6 months (around 5/15/2024) for Recheck.    I spent approximately 30 minutes providing clinical care for this patient; including review of patient's chart and provider documentation, face to face time spent with patient in examination room (obtaining history, performing physical exam, discussing diagnosis and management options), placing orders, and completing patient documentation.     Vincenzo Zacarias MD  [facility] [place]

## 2023-11-28 ENCOUNTER — ROUTINE PRENATAL (OUTPATIENT)
Dept: OBSTETRICS AND GYNECOLOGY | Facility: CLINIC | Age: 28
End: 2023-11-28
Payer: COMMERCIAL

## 2023-11-28 ENCOUNTER — LAB (OUTPATIENT)
Dept: LAB | Facility: HOSPITAL | Age: 28
End: 2023-11-28
Payer: COMMERCIAL

## 2023-11-28 VITALS — SYSTOLIC BLOOD PRESSURE: 110 MMHG | WEIGHT: 206 LBS | BODY MASS INDEX: 33.27 KG/M2 | DIASTOLIC BLOOD PRESSURE: 72 MMHG

## 2023-11-28 DIAGNOSIS — O36.62X1: ICD-10-CM

## 2023-11-28 DIAGNOSIS — Z34.82 PRENATAL CARE, SUBSEQUENT PREGNANCY IN SECOND TRIMESTER: Primary | ICD-10-CM

## 2023-11-28 DIAGNOSIS — Z34.82 PRENATAL CARE, SUBSEQUENT PREGNANCY IN SECOND TRIMESTER: ICD-10-CM

## 2023-11-28 DIAGNOSIS — Z34.82 PRENATAL CARE, SUBSEQUENT PREGNANCY, SECOND TRIMESTER: Primary | ICD-10-CM

## 2023-11-28 PROBLEM — O99.019 MATERNAL ANEMIA IN PREGNANCY, ANTEPARTUM: Status: ACTIVE | Noted: 2023-11-28

## 2023-11-28 LAB
GLUCOSE 1H P 100 G GLC PO SERPL-MCNC: 127 MG/DL (ref 65–139)
GLUCOSE UR STRIP-MCNC: ABNORMAL MG/DL
HCT VFR BLD AUTO: 30.6 % (ref 34–46.6)
HGB BLD-MCNC: 10 G/DL (ref 12–15.9)
PROT UR STRIP-MCNC: NEGATIVE MG/DL

## 2023-11-28 PROCEDURE — 85018 HEMOGLOBIN: CPT

## 2023-11-28 PROCEDURE — 85014 HEMATOCRIT: CPT

## 2023-11-28 PROCEDURE — 36415 COLL VENOUS BLD VENIPUNCTURE: CPT

## 2023-11-28 PROCEDURE — 0502F SUBSEQUENT PRENATAL CARE: CPT | Performed by: OBSTETRICS & GYNECOLOGY

## 2023-11-28 PROCEDURE — 82950 GLUCOSE TEST: CPT

## 2023-11-28 NOTE — PROGRESS NOTES
Chief Complaint   Patient presents with    Routine Prenatal Visit       HPI: Geraldine is a  currently at 28w4d who today reports the following:  Contractions - No; Leaking - No; Vaginal bleeding - No; Heartburn - improved as compared to the prior visit.    ROS:  GI: Nausea - No ; Constipation - No; Diarrhea - No    Neuro: Headache - No ; Visual change - No    Respiratory: Cough - No; SOB - No; fever - No     EXAM:  Vitals: See prenatal flowsheet   Abdomen: See prenatal flowsheet   Urine glucose/protein: See prenatal flowsheet   Pelvic: See prenatal flowsheet     Lab Results   Component Value Date    ABO A 2023    RH Positive 2023    ABSCRN Negative 2023       MDM:  Impression: Supervision of high risk pregnancy  S>D   Tests ordered/done today: GCT  HgB   Counseling: Prenatal labs reviewed  Need to continue with PNV's  No additional counseling given - she has no specific complaints or concerns   Tests ordered today for her next visit:   U/S for EFW

## 2023-12-12 ENCOUNTER — ROUTINE PRENATAL (OUTPATIENT)
Dept: OBSTETRICS AND GYNECOLOGY | Facility: CLINIC | Age: 28
End: 2023-12-12
Payer: COMMERCIAL

## 2023-12-12 VITALS — BODY MASS INDEX: 33.75 KG/M2 | SYSTOLIC BLOOD PRESSURE: 112 MMHG | DIASTOLIC BLOOD PRESSURE: 74 MMHG | WEIGHT: 209 LBS

## 2023-12-12 DIAGNOSIS — Z34.83 PRENATAL CARE, SUBSEQUENT PREGNANCY IN THIRD TRIMESTER: Primary | ICD-10-CM

## 2023-12-12 DIAGNOSIS — O99.019 MATERNAL ANEMIA IN PREGNANCY, ANTEPARTUM: ICD-10-CM

## 2023-12-12 LAB
GLUCOSE UR STRIP-MCNC: NEGATIVE MG/DL
PROT UR STRIP-MCNC: NEGATIVE MG/DL

## 2023-12-12 NOTE — PROGRESS NOTES
Chief Complaint   Patient presents with    Routine Prenatal Visit       HPI: Geraldine is a  currently at 30w4d who today reports the following:  Contractions - No; Leaking - No; Vaginal bleeding -  No; Swelling of extremities - No.  Not taking the Fe regularly    ROS:  GI: Nausea - No; Constipation - No; Diarrhea - No    Neuro: Headache - No; Visual change - No    Respiratory: Cough - No; SOB - No; fever - No     EXAM:  Vitals: See prenatal flowsheet   Abdomen: See prenatal flowsheet   Urine glucose/protein: See prenatal flowsheet   Pelvic: See prenatal flowsheet   MDM:   Impression: Supervision of high risk pregnancy  Anemia in pregnancy  GERD in pregnancy   Tests done today: U/S for EFW - S=D   Topics discussed: Continue with PNV's and Fe  Prenatal labs reviewed  No additional counseling given - she has no specific complaints or concerns   Tests scheduled today for her next visit:   none

## 2023-12-29 ENCOUNTER — ROUTINE PRENATAL (OUTPATIENT)
Dept: OBSTETRICS AND GYNECOLOGY | Facility: CLINIC | Age: 28
End: 2023-12-29
Payer: COMMERCIAL

## 2023-12-29 VITALS — WEIGHT: 210 LBS | DIASTOLIC BLOOD PRESSURE: 60 MMHG | SYSTOLIC BLOOD PRESSURE: 120 MMHG | BODY MASS INDEX: 33.91 KG/M2

## 2023-12-29 DIAGNOSIS — Z34.83 PRENATAL CARE, SUBSEQUENT PREGNANCY IN THIRD TRIMESTER: ICD-10-CM

## 2023-12-29 DIAGNOSIS — N89.8 VAGINAL IRRITATION: ICD-10-CM

## 2023-12-29 DIAGNOSIS — Z3A.33 33 WEEKS GESTATION OF PREGNANCY: Primary | ICD-10-CM

## 2023-12-29 DIAGNOSIS — O99.019 MATERNAL ANEMIA IN PREGNANCY, ANTEPARTUM: ICD-10-CM

## 2023-12-29 LAB
GLUCOSE UR STRIP-MCNC: NEGATIVE MG/DL
PROT UR STRIP-MCNC: NEGATIVE MG/DL

## 2023-12-29 PROCEDURE — 0502F SUBSEQUENT PRENATAL CARE: CPT | Performed by: NURSE PRACTITIONER

## 2023-12-29 RX ORDER — DOCUSATE SODIUM 100 MG/1
100 CAPSULE, LIQUID FILLED ORAL 2 TIMES DAILY
Qty: 60 CAPSULE | Refills: 1 | Status: SHIPPED | OUTPATIENT
Start: 2023-12-29

## 2023-12-29 NOTE — PROGRESS NOTES
Chief Complaint   Patient presents with    Routine Prenatal Visit     ob       HPI: Geraldine is a  currently at 33w0d who today reports the following: She reports good fetal movement. She reports some external vaginal irritation more towards her urethra and thin white vaginal discharge. She is tolerating her iron supplement well however is having some constipation. She drinks about 3 bottles of water a day.  Contractions - No; Leaking - No; Vaginal bleeding -  No; Swelling of extremities - No.    ROS:  GI: Nausea - No; Constipation - YES; Diarrhea - No    Neuro: Headache - No; Visual change - No      EXAM:  Vitals: See prenatal flowsheet   Abdomen: See prenatal flowsheet   Urine glucose/protein: See prenatal flowsheet   Pelvic: See prenatal flowsheet  External redness and irritation noted at clitoral wren, thin white vaginal discharge.    MDM:   Impression: Supervision of high risk pregnancy  Anemia in pregnancy  GERD in pregnancy  Size greater than dates, efw 54% on u/s 23.   Tests done today: none   Topics discussed: Continue with PNV's  Prenatal labs reviewed   labor signs and symptoms  Symptoms of preeclampsia  Kick counts reviewed  Encouraged increased hydration and use of stool softener if needed.  Start terazole    Tests scheduled today for her next visit:   none

## 2024-01-11 ENCOUNTER — ROUTINE PRENATAL (OUTPATIENT)
Dept: OBSTETRICS AND GYNECOLOGY | Facility: CLINIC | Age: 29
End: 2024-01-11
Payer: COMMERCIAL

## 2024-01-11 VITALS — BODY MASS INDEX: 34.4 KG/M2 | WEIGHT: 213 LBS | DIASTOLIC BLOOD PRESSURE: 70 MMHG | SYSTOLIC BLOOD PRESSURE: 118 MMHG

## 2024-01-11 DIAGNOSIS — O36.62X1: ICD-10-CM

## 2024-01-11 DIAGNOSIS — Z34.83 PRENATAL CARE, SUBSEQUENT PREGNANCY IN THIRD TRIMESTER: Primary | ICD-10-CM

## 2024-01-11 NOTE — PROGRESS NOTES
Chief Complaint   Patient presents with    Routine Prenatal Visit       HPI: Geraldine is a  currently at 34w6d who today reports the following:  Contractions - No; Leaking - No; Vaginal bleeding -  No; Swelling of extremities - No.    ROS:  GI: Nausea - No; Constipation - No; Diarrhea - No    Neuro: Headache - No; Visual change - No    Respiratory: Cough - No; SOB - No; fever - No     EXAM:  Vitals: See prenatal flowsheet   Abdomen: See prenatal flowsheet   Urine glucose/protein: See prenatal flowsheet   Pelvic: See prenatal flowsheet   MDM:   Impression: Supervision of high risk pregnancy  Anemia in pregnancy  LGA but S=D @ last U/S   Tests done today: none   Topics discussed: Continue with PNV's  Prenatal labs reviewed  No additional counseling given - she has no specific complaints or concerns   Tests scheduled today for her next visit:   U/S for EFW

## 2024-01-18 ENCOUNTER — ROUTINE PRENATAL (OUTPATIENT)
Dept: OBSTETRICS AND GYNECOLOGY | Facility: CLINIC | Age: 29
End: 2024-01-18
Payer: COMMERCIAL

## 2024-01-18 VITALS — WEIGHT: 213 LBS | SYSTOLIC BLOOD PRESSURE: 122 MMHG | BODY MASS INDEX: 34.4 KG/M2 | DIASTOLIC BLOOD PRESSURE: 70 MMHG

## 2024-01-18 DIAGNOSIS — Z34.83 PRENATAL CARE, SUBSEQUENT PREGNANCY IN THIRD TRIMESTER: Primary | ICD-10-CM

## 2024-01-18 DIAGNOSIS — O36.63X1 LARGE FOR GESTATIONAL AGE FETUS AFFECTING MANAGEMENT OF MOTHER, ANTEPARTUM, THIRD TRIMESTER, FETUS 1: ICD-10-CM

## 2024-01-18 LAB
GLUCOSE UR STRIP-MCNC: NEGATIVE MG/DL
PROT UR STRIP-MCNC: NEGATIVE MG/DL

## 2024-01-18 NOTE — PROGRESS NOTES
Chief Complaint   Patient presents with    Routine Prenatal Visit       HPI: Geraldine is a  currently at 35w6d who today reports the following:  Contractions - No; Leaking - No; Vaginal bleeding -  No; Swelling of extremities - No.    ROS:  GI: Nausea - No; Constipation - No; Diarrhea - No    Neuro: Headache - No; Visual change - No    Respiratory: Cough - No; SOB - No; fever - No     EXAM:  Vitals: See prenatal flowsheet   Abdomen: See prenatal flowsheet   Urine glucose/protein: See prenatal flowsheet   Pelvic: See prenatal flowsheet   MDM:   Impression: Supervision of low risk pregnancy  Large for gestational age based upon abdominal circumference.  She has delivered to large for gestational age infants in the past neither of which she had issues with shoulder dystocia   Tests done today: U/S for EFW - 70th percentile with abdominal circumference greater than 99th   Topics discussed: Continue with PNV's  Prenatal labs reviewed  Given that she is a nondiabetic would manage expectantly  Might be a bit hesitant to perform an operative delivery if second stage protracted labor occurs   Tests scheduled today for her next visit:   none

## 2024-01-25 ENCOUNTER — ROUTINE PRENATAL (OUTPATIENT)
Dept: OBSTETRICS AND GYNECOLOGY | Facility: CLINIC | Age: 29
End: 2024-01-25
Payer: COMMERCIAL

## 2024-01-25 VITALS — WEIGHT: 217 LBS | DIASTOLIC BLOOD PRESSURE: 80 MMHG | BODY MASS INDEX: 35.04 KG/M2 | SYSTOLIC BLOOD PRESSURE: 126 MMHG

## 2024-01-25 DIAGNOSIS — Z34.83 PRENATAL CARE, SUBSEQUENT PREGNANCY IN THIRD TRIMESTER: Primary | ICD-10-CM

## 2024-01-25 DIAGNOSIS — O36.63X1 LARGE FOR GESTATIONAL AGE FETUS AFFECTING MANAGEMENT OF MOTHER, ANTEPARTUM, THIRD TRIMESTER, FETUS 1: ICD-10-CM

## 2024-01-25 LAB — GP B STREP RRNA SPEC QL PROBE: NEGATIVE

## 2024-01-25 PROCEDURE — 0502F SUBSEQUENT PRENATAL CARE: CPT | Performed by: OBSTETRICS & GYNECOLOGY

## 2024-01-25 NOTE — PROGRESS NOTES
Chief Complaint   Patient presents with    Routine Prenatal Visit       HPI: Geraldine is a  currently at 36w6d who today reports the following:  Contractions - No; Leaking - No; Vaginal bleeding -  No; Swelling of extremities - No.    ROS:  GI: Nausea - No; Constipation - No; Diarrhea - No    Neuro: Headache - No; Visual change - No    Respiratory: Cough - No; SOB - No; fever - No     EXAM:  Vitals: See prenatal flowsheet   Abdomen: See prenatal flowsheet   Urine glucose/protein: See prenatal flowsheet   Pelvic: See prenatal flowsheet   MDM:   Impression: Supervision of high risk pregnancy  LGA  Anemia in pregnancy   Tests done today: GBS testing   Topics discussed: Continue with PNV's  Prenatal labs reviewed  I am out -   Tests scheduled today for her next visit:   none

## 2024-01-30 ENCOUNTER — DOCUMENTATION (OUTPATIENT)
Dept: OBSTETRICS AND GYNECOLOGY | Facility: CLINIC | Age: 29
End: 2024-01-30
Payer: COMMERCIAL

## 2024-02-02 ENCOUNTER — ROUTINE PRENATAL (OUTPATIENT)
Dept: OBSTETRICS AND GYNECOLOGY | Facility: CLINIC | Age: 29
End: 2024-02-02
Payer: COMMERCIAL

## 2024-02-02 VITALS — SYSTOLIC BLOOD PRESSURE: 122 MMHG | WEIGHT: 220 LBS | BODY MASS INDEX: 35.53 KG/M2 | DIASTOLIC BLOOD PRESSURE: 80 MMHG

## 2024-02-02 DIAGNOSIS — O36.63X1 LARGE FOR GESTATIONAL AGE FETUS AFFECTING MANAGEMENT OF MOTHER, ANTEPARTUM, THIRD TRIMESTER, FETUS 1: ICD-10-CM

## 2024-02-02 DIAGNOSIS — Z34.83 PRENATAL CARE, SUBSEQUENT PREGNANCY IN THIRD TRIMESTER: Primary | ICD-10-CM

## 2024-02-02 PROBLEM — Z34.90 ENCOUNTER FOR ELECTIVE INDUCTION OF LABOR: Status: ACTIVE | Noted: 2024-02-02

## 2024-02-02 NOTE — PROGRESS NOTES
Chief Complaint   Patient presents with    Routine Prenatal Visit       HPI: Geraldine is a  currently at 38w0d who today reports the following:  Contractions - No; Leaking - No; Vaginal bleeding -  No; Swelling of extremities - No.    ROS:  GI: Nausea - No; Constipation - No; Diarrhea - No    Neuro: Headache - No; Visual change - No    Respiratory: Cough - No; SOB - No; fever - No     EXAM:  Vitals: See prenatal flowsheet   Abdomen: See prenatal flowsheet   Urine glucose/protein: See prenatal flowsheet   Pelvic: See prenatal flowsheet   MDM:   Impression: Supervision of high risk pregnancy  Anemia in pregnancy  LGA   Tests done today: none   Topics discussed: Continue with PNV's  Prenatal labs reviewed  I spoke with Geraldine about management of postdates pregnancy with an unfavorable cervix.  If the cervix is unfavorable, the  rate with IOL may be > 2x the baseline  rate.  I explained to Geraldine that as any pregnancy extends beyond the due date, the risks of fetal demise gradually rise.  Beyond 41 1/2 weeks gestation, the rates of IUFD increases further.  The risks of extending pregnancy beyond the due date have to be balanced against the risk of increasing the rates of  with induction of labor (IOL).   She will be seen twice weekly after her EDC.  At each visit, an assessment of fetal health and well being will be performed (i.e. NST, BPP, JANELLE).  So long as the assessment of fetal health is reassuring and there is no maternal indication for an IOL, it was my recommendation that unless there is evidence of excess maternal or fetal risk, we defer IOL until 41 1/2 weeks.  I told Geraldine that beyond 39 weeks, an elective IOL is always an option.  However, I cannot alter the increased rate of  if her cervix is unfavorable.  Additional, I discussed with Geraldine fetal movement counting. I explained to Geraldine that if she is noticing diminished quantity of fetal movement, she should  eat or drink something and lay down on her side for 30 minutes.  So long as there are 3 movements in the 30 minute window, fetal movement is normal.  She should do this twice daily if fetal movement is perceived to be diminished.  If ever she gets less than 3 movements in a 30 minute window, she is to call the office immediately for further fetal testing.   Tests scheduled today for her next visit:   none

## 2024-02-07 ENCOUNTER — ROUTINE PRENATAL (OUTPATIENT)
Dept: OBSTETRICS AND GYNECOLOGY | Facility: CLINIC | Age: 29
End: 2024-02-07
Payer: COMMERCIAL

## 2024-02-07 VITALS — DIASTOLIC BLOOD PRESSURE: 62 MMHG | WEIGHT: 218 LBS | BODY MASS INDEX: 35.2 KG/M2 | SYSTOLIC BLOOD PRESSURE: 110 MMHG

## 2024-02-07 DIAGNOSIS — Z3A.38 38 WEEKS GESTATION OF PREGNANCY: Primary | ICD-10-CM

## 2024-02-07 DIAGNOSIS — O36.63X1 LARGE FOR GESTATIONAL AGE FETUS AFFECTING MANAGEMENT OF MOTHER, ANTEPARTUM, THIRD TRIMESTER, FETUS 1: ICD-10-CM

## 2024-02-07 DIAGNOSIS — O99.019 MATERNAL ANEMIA IN PREGNANCY, ANTEPARTUM: ICD-10-CM

## 2024-02-07 DIAGNOSIS — Z34.83 PRENATAL CARE, SUBSEQUENT PREGNANCY IN THIRD TRIMESTER: ICD-10-CM

## 2024-02-07 LAB
COLOR UR: NORMAL
GLUCOSE UR STRIP-MCNC: NEGATIVE MG/DL
PROT UR STRIP-MCNC: NEGATIVE MG/DL

## 2024-02-07 NOTE — PROGRESS NOTES
Chief Complaint   Patient presents with    Routine Prenatal Visit       HPI: Geraldine is a  currently at 38w5d who today reports the following: She reports good fetal movement. She is concerned for fetal position as she feels baby has not gotten lower in her pelvis, feels fetal movements mostly in upper abdomen   Contractions - YES - but less than 4/hour AND no associated change in vaginal discharge; Leaking - No; Vaginal bleeding -  No; Swelling of extremities - YES.    ROS:  GI: Nausea - No; Constipation - No; Diarrhea - No    Neuro: Headache - No; Visual change - No      EXAM:  Vitals: See prenatal flowsheet   Abdomen: See prenatal flowsheet   Urine glucose/protein: See prenatal flowsheet   Pelvic: See prenatal flowsheet   MDM:   Impression: Supervision of high risk pregnancy  Anemia in pregnancy  LGA   Tests done today: none   Topics discussed: Continue with PNV's  Prenatal labs reviewed  Labor signs and symptoms  Symptoms of preeclampsia  Kick counts reviewed   Vertex presentation confirmed by pelvic exam and u/s    Tests scheduled today for her next visit:   Has iol scheduled  pm

## 2024-02-08 NOTE — PROGRESS NOTES
Chief Complaint   Patient presents with    Ear Problem     Right ear pain started today    Throat Problem     Sore throat with swallowing x1 week     PPE utilized for this face-to face office visit:  Examiner wearing a surgical mask.      Visit Vitals  Pulse 88   Temp 98.6 °F (37 °C) (Oral)   Wt (!) 104.3 kg (230 lb)   LMP 01/07/2024 (Approximate)   SpO2 99%     HISTORY OF PRESENT ILLNESS     Mary Beth is a 15 year old here today with mother for evaluation of sore throat and earache that began last week.  Notes pain on the right side of her throat, and describes right ear pain, with slight decrease in hearing but no drainage from the right ear.  Denies any fevers but has noted a headache at onset of symptoms, which has since subsided.  Denies body aches, appetite and energy levels are stable.  No cough or chest congestion, shortness of breath, wheezing, denies any GI complaints.    Therapies attempted at home:  Has been taking Tylenol, last dose earlier this morning.  Recent travel:  Denies  Sick contacts:  Mother with sinus congestion for the last week, but no exposure to any COVID, flu, strep.  Recent antibiotic use:  None in the last 30 days  Secondhand smoke exposure:  Denies  History of asthma, reactive airway disease:  Positive for history of reactive airway disease, has not needed to use her rescue inhaler.    PCP: Asia Chávez APNP    PAST MEDICAL, FAMILY AND SOCIAL HISTORY     Previous medical, surgical history, medications and allergies reviewed in EHR.     REVIEW OF SYSTEMS     Review of systems as noted in HPI, otherwise negative.     PHYSICAL EXAM     General: Alert, nontoxic child in NAD.   HEENT:   Eyes- Normal conjunctiva and sclera, no ocular drainage.  Ears- Bilateral TM with Eustachian tube dysfunction/serous otitis/middle ear effusion. No erythema, bulging, or retraction of the TM. Hearing WNL.   Nose- Nasal mucosa pink and moist. Turbinates unremarkable.  Oral mucosa: Pink, moist, no oral  Chief Complaint   Patient presents with   • Routine Prenatal Visit       HPI: Geraldine is a  currently at 24w6d who today reports the following:  Contractions - No; Leaking - No; Vaginal bleeding -  No; Heartburn - No.  Thinks has a yeast infection    ROS:  GI: Nausea - No ; Constipation - No; Diarrhea - No    Neuro: Headache - No ; Visual change - No      EXAM:  Vitals: See prenatal flowsheet   Abdomen: See prenatal flowsheet   Urine glucose/protein: See prenatal flowsheet   Pelvic: Thick white d/c with normal pH     Lab Results   Component Value Date    ABO A 2018    RH Positive 2018    ABSCRN Negative 2018       MDM:  Impression: 1. Supervision of low risk pregnancy  2. Candida   Tests done today: 1. none   Topics discussed: 1. Continue with PNV's  2. Prenatal labs reviewed  3. none - she had no major complaints,questions or concerns   Tests scheduled today for her next visit:   GCT  HgB     New Medications Ordered This Visit   Medications   • terconazole (TERAZOL 3) 80 MG vaginal suppository     Sig: Insert 1 suppository into the vagina Every Night for 3 days.     Dispense:  3 suppository     Refill:  0        lesions. Normal dentition. Posterior oropharynx with mild erythema, normal tonsils, no exudate.  No palatal petechiae.  Neck: Supple, non-tender, trachea midline. No lymphadenopathy.  Full range of motion of the neck.  Cardiac: Heart RRR, S1-S2 no edema.   Pulmonary: Lungs CTA all lobes with good air movement.  No cough, no wheezing, respirations non-labored.   Neuro: Alert, no focal deficits.   Skin: W/D/I, no rashes.     ASSESSMENT/PLAN     Acute pharyngitis, unspecified etiology  (primary encounter diagnosis)  Non-recurrent acute serous otitis media of both ears      Suspect viral etiology.  Reassurance provided that she does have a normal ear exam without evidence of acute infection at this time, discussed fluid behind the ear, and usual timeline for this to resolve.    Walk In on 02/08/2024   Component Date Value Ref Range Status    STREPTOCOCCUS GROUP A PCR 02/08/2024 Not Detected  Not Detected Final    TEST LOT NUMBER 02/08/2024 [id number]   Final    TEST LOT EXPIRATION DATE 02/08/2024 [id number]   Final     Reassurance provided that strep test is negative.  Again, suspect viral etiology.  Supportive care measures of rest, adequate hydration, fever management, and supportive care for alleviation of symptoms reviewed with parent.  Reviewed good hand hygiene and ways to prevent spread of illness.  Follow up with primary care provider(PCP), or call PCP with new symptoms, changing or worsening symptoms, or failure of symptoms to improve as discussed.    Plan of care discussed with parent, who verbalized understanding and had no further concerns. Questions welcomed and answered, parent in agreement with plan of care.     I spent a total of 20 minutes on the day of the visit.  This includes chart review and documenting.

## 2024-02-12 ENCOUNTER — TELEPHONE (OUTPATIENT)
Dept: OBSTETRICS AND GYNECOLOGY | Facility: CLINIC | Age: 29
End: 2024-02-12
Payer: COMMERCIAL

## 2024-02-12 ENCOUNTER — HOSPITAL ENCOUNTER (OUTPATIENT)
Facility: HOSPITAL | Age: 29
Discharge: HOME | End: 2024-02-12
Attending: OBSTETRICS & GYNECOLOGY | Admitting: OBSTETRICS & GYNECOLOGY
Payer: COMMERCIAL

## 2024-02-12 VITALS
TEMPERATURE: 98.2 F | RESPIRATION RATE: 16 BRPM | DIASTOLIC BLOOD PRESSURE: 76 MMHG | HEART RATE: 77 BPM | SYSTOLIC BLOOD PRESSURE: 128 MMHG

## 2024-02-12 PROBLEM — Z34.93 THIRD TRIMESTER PREGNANCY: Status: ACTIVE | Noted: 2024-02-12

## 2024-02-12 LAB — POC AMNISURE: NEGATIVE

## 2024-02-12 PROCEDURE — 59025 FETAL NON-STRESS TEST: CPT

## 2024-02-12 PROCEDURE — G0463 HOSPITAL OUTPT CLINIC VISIT: HCPCS

## 2024-02-12 PROCEDURE — 84112 EVAL AMNIOTIC FLUID PROTEIN: CPT | Performed by: OBSTETRICS & GYNECOLOGY

## 2024-02-12 PROCEDURE — 99213 OFFICE O/P EST LOW 20 MIN: CPT | Performed by: OBSTETRICS & GYNECOLOGY

## 2024-02-12 PROCEDURE — 59025 FETAL NON-STRESS TEST: CPT | Performed by: OBSTETRICS & GYNECOLOGY

## 2024-02-12 NOTE — TELEPHONE ENCOUNTER
JOSÉ LUIS    Patient called in stating that when she woke up her underwear and pants were wet and she thinks she has been having dominick kam contractions.  She wants to be seen in the office today, Please advise.

## 2024-02-14 ENCOUNTER — HOSPITAL ENCOUNTER (OUTPATIENT)
Dept: LABOR AND DELIVERY | Facility: HOSPITAL | Age: 29
Discharge: HOME | End: 2024-02-14
Payer: COMMERCIAL

## 2024-02-14 ENCOUNTER — PREP FOR SURGERY (OUTPATIENT)
Dept: OTHER | Facility: HOSPITAL | Age: 29
End: 2024-02-14
Payer: COMMERCIAL

## 2024-02-14 ENCOUNTER — HOSPITAL ENCOUNTER (INPATIENT)
Facility: HOSPITAL | Age: 29
LOS: 2 days | Discharge: HOME | End: 2024-02-16
Attending: OBSTETRICS & GYNECOLOGY | Admitting: OBSTETRICS & GYNECOLOGY
Payer: COMMERCIAL

## 2024-02-14 DIAGNOSIS — Z34.90 ENCOUNTER FOR ELECTIVE INDUCTION OF LABOR: ICD-10-CM

## 2024-02-14 DIAGNOSIS — Z34.83 PRENATAL CARE, SUBSEQUENT PREGNANCY IN THIRD TRIMESTER: ICD-10-CM

## 2024-02-14 DIAGNOSIS — O36.63X1 LARGE FOR GESTATIONAL AGE FETUS AFFECTING MANAGEMENT OF MOTHER, ANTEPARTUM, THIRD TRIMESTER, FETUS 1: ICD-10-CM

## 2024-02-14 DIAGNOSIS — Z34.83 PRENATAL CARE, SUBSEQUENT PREGNANCY IN THIRD TRIMESTER: Primary | ICD-10-CM

## 2024-02-14 PROBLEM — Z34.93 THIRD TRIMESTER PREGNANCY: Status: RESOLVED | Noted: 2024-02-12 | Resolved: 2024-02-14

## 2024-02-14 LAB
ABO GROUP BLD: NORMAL
BLD GP AB SCN SERPL QL: NEGATIVE
DEPRECATED RDW RBC AUTO: 38.8 FL (ref 37–54)
ERYTHROCYTE [DISTWIDTH] IN BLOOD BY AUTOMATED COUNT: 14.8 % (ref 12.3–15.4)
HCT VFR BLD AUTO: 29.9 % (ref 34–46.6)
HGB BLD-MCNC: 9.2 G/DL (ref 12–15.9)
MCH RBC QN AUTO: 22.3 PG (ref 26.6–33)
MCHC RBC AUTO-ENTMCNC: 30.8 G/DL (ref 31.5–35.7)
MCV RBC AUTO: 72.6 FL (ref 79–97)
PLATELET # BLD AUTO: 219 10*3/MM3 (ref 140–450)
PMV BLD AUTO: 11.6 FL (ref 6–12)
RBC # BLD AUTO: 4.12 10*6/MM3 (ref 3.77–5.28)
RH BLD: POSITIVE
T&S EXPIRATION DATE: NORMAL
WBC NRBC COR # BLD AUTO: 6.12 10*3/MM3 (ref 3.4–10.8)

## 2024-02-14 PROCEDURE — 86900 BLOOD TYPING SEROLOGIC ABO: CPT | Performed by: OBSTETRICS & GYNECOLOGY

## 2024-02-14 PROCEDURE — 86850 RBC ANTIBODY SCREEN: CPT | Performed by: OBSTETRICS & GYNECOLOGY

## 2024-02-14 PROCEDURE — 25810000003 LACTATED RINGERS PER 1000 ML: Performed by: OBSTETRICS & GYNECOLOGY

## 2024-02-14 PROCEDURE — 86901 BLOOD TYPING SEROLOGIC RH(D): CPT | Performed by: OBSTETRICS & GYNECOLOGY

## 2024-02-14 PROCEDURE — 59025 FETAL NON-STRESS TEST: CPT

## 2024-02-14 PROCEDURE — 85027 COMPLETE CBC AUTOMATED: CPT | Performed by: OBSTETRICS & GYNECOLOGY

## 2024-02-14 PROCEDURE — 86780 TREPONEMA PALLIDUM: CPT | Performed by: OBSTETRICS & GYNECOLOGY

## 2024-02-14 RX ORDER — SODIUM CHLORIDE 0.9 % (FLUSH) 0.9 %
10 SYRINGE (ML) INJECTION EVERY 12 HOURS SCHEDULED
Status: DISCONTINUED | OUTPATIENT
Start: 2024-02-14 | End: 2024-02-15 | Stop reason: HOSPADM

## 2024-02-14 RX ORDER — NALOXONE HCL 0.4 MG/ML
0.4 VIAL (ML) INJECTION
Status: DISCONTINUED | OUTPATIENT
Start: 2024-02-14 | End: 2024-02-14

## 2024-02-14 RX ORDER — LIDOCAINE HYDROCHLORIDE 10 MG/ML
0.5 INJECTION, SOLUTION EPIDURAL; INFILTRATION; INTRACAUDAL; PERINEURAL ONCE AS NEEDED
Status: COMPLETED | OUTPATIENT
Start: 2024-02-14 | End: 2024-02-15

## 2024-02-14 RX ORDER — MORPHINE SULFATE 2 MG/ML
2 INJECTION, SOLUTION INTRAMUSCULAR; INTRAVENOUS
Status: CANCELLED | OUTPATIENT
Start: 2024-02-14 | End: 2024-02-24

## 2024-02-14 RX ORDER — MORPHINE SULFATE 2 MG/ML
2 INJECTION, SOLUTION INTRAMUSCULAR; INTRAVENOUS EVERY 4 HOURS PRN
Status: CANCELLED | OUTPATIENT
Start: 2024-02-14 | End: 2024-02-19

## 2024-02-14 RX ORDER — OXYCODONE HYDROCHLORIDE AND ACETAMINOPHEN 5; 325 MG/1; MG/1
1 TABLET ORAL EVERY 4 HOURS PRN
Status: CANCELLED | OUTPATIENT
Start: 2024-02-14 | End: 2024-02-24

## 2024-02-14 RX ORDER — ACETAMINOPHEN 325 MG/1
650 TABLET ORAL EVERY 4 HOURS PRN
Status: DISCONTINUED | OUTPATIENT
Start: 2024-02-14 | End: 2024-02-15 | Stop reason: HOSPADM

## 2024-02-14 RX ORDER — MAGNESIUM CARB/ALUMINUM HYDROX 105-160MG
30 TABLET,CHEWABLE ORAL ONCE
Status: CANCELLED | OUTPATIENT
Start: 2024-02-14 | End: 2024-02-14

## 2024-02-14 RX ORDER — OXYTOCIN/0.9 % SODIUM CHLORIDE 30/500 ML
2-24 PLASTIC BAG, INJECTION (ML) INTRAVENOUS
Status: DISCONTINUED | OUTPATIENT
Start: 2024-02-15 | End: 2024-02-15 | Stop reason: HOSPADM

## 2024-02-14 RX ORDER — LIDOCAINE HYDROCHLORIDE 10 MG/ML
0.5 INJECTION, SOLUTION EPIDURAL; INFILTRATION; INTRACAUDAL; PERINEURAL ONCE AS NEEDED
Status: CANCELLED | OUTPATIENT
Start: 2024-02-14

## 2024-02-14 RX ORDER — SODIUM CHLORIDE 9 MG/ML
40 INJECTION, SOLUTION INTRAVENOUS AS NEEDED
Status: DISCONTINUED | OUTPATIENT
Start: 2024-02-14 | End: 2024-02-15 | Stop reason: HOSPADM

## 2024-02-14 RX ORDER — SODIUM CHLORIDE 0.9 % (FLUSH) 0.9 %
10 SYRINGE (ML) INJECTION EVERY 12 HOURS SCHEDULED
Status: CANCELLED | OUTPATIENT
Start: 2024-02-14

## 2024-02-14 RX ORDER — NALOXONE HCL 0.4 MG/ML
0.4 VIAL (ML) INJECTION
Status: CANCELLED | OUTPATIENT
Start: 2024-02-14

## 2024-02-14 RX ORDER — SODIUM CHLORIDE, SODIUM LACTATE, POTASSIUM CHLORIDE, CALCIUM CHLORIDE 600; 310; 30; 20 MG/100ML; MG/100ML; MG/100ML; MG/100ML
125 INJECTION, SOLUTION INTRAVENOUS CONTINUOUS
Status: DISCONTINUED | OUTPATIENT
Start: 2024-02-14 | End: 2024-02-16

## 2024-02-14 RX ORDER — MORPHINE SULFATE 1 MG/ML
1 INJECTION, SOLUTION EPIDURAL; INTRATHECAL; INTRAVENOUS EVERY 4 HOURS PRN
Status: CANCELLED | OUTPATIENT
Start: 2024-02-14 | End: 2024-02-19

## 2024-02-14 RX ORDER — ONDANSETRON 4 MG/1
4 TABLET, ORALLY DISINTEGRATING ORAL EVERY 6 HOURS PRN
Status: CANCELLED | OUTPATIENT
Start: 2024-02-14

## 2024-02-14 RX ORDER — MISOPROSTOL 200 UG/1
800 TABLET ORAL AS NEEDED
Status: CANCELLED | OUTPATIENT
Start: 2024-02-14

## 2024-02-14 RX ORDER — CARBOPROST TROMETHAMINE 250 UG/ML
250 INJECTION, SOLUTION INTRAMUSCULAR AS NEEDED
Status: CANCELLED | OUTPATIENT
Start: 2024-02-14

## 2024-02-14 RX ORDER — OXYTOCIN/0.9 % SODIUM CHLORIDE 30/500 ML
2-24 PLASTIC BAG, INJECTION (ML) INTRAVENOUS
Status: CANCELLED | OUTPATIENT
Start: 2024-02-15

## 2024-02-14 RX ORDER — MISOPROSTOL 100 MCG
25 TABLET ORAL
Status: ACTIVE | OUTPATIENT
Start: 2024-02-15 | End: 2024-02-15

## 2024-02-14 RX ORDER — OXYTOCIN/0.9 % SODIUM CHLORIDE 30/500 ML
999 PLASTIC BAG, INJECTION (ML) INTRAVENOUS ONCE
Status: CANCELLED | OUTPATIENT
Start: 2024-02-14 | End: 2024-02-14

## 2024-02-14 RX ORDER — MORPHINE SULFATE 2 MG/ML
1 INJECTION, SOLUTION INTRAMUSCULAR; INTRAVENOUS EVERY 4 HOURS PRN
Status: DISCONTINUED | OUTPATIENT
Start: 2024-02-14 | End: 2024-02-14

## 2024-02-14 RX ORDER — MORPHINE SULFATE 2 MG/ML
2 INJECTION, SOLUTION INTRAMUSCULAR; INTRAVENOUS EVERY 4 HOURS PRN
Status: DISCONTINUED | OUTPATIENT
Start: 2024-02-14 | End: 2024-02-14

## 2024-02-14 RX ORDER — SODIUM CHLORIDE 0.9 % (FLUSH) 0.9 %
10 SYRINGE (ML) INJECTION AS NEEDED
Status: CANCELLED | OUTPATIENT
Start: 2024-02-14

## 2024-02-14 RX ORDER — IBUPROFEN 600 MG/1
600 TABLET ORAL EVERY 6 HOURS PRN
Status: CANCELLED | OUTPATIENT
Start: 2024-02-14

## 2024-02-14 RX ORDER — SODIUM CHLORIDE, SODIUM LACTATE, POTASSIUM CHLORIDE, CALCIUM CHLORIDE 600; 310; 30; 20 MG/100ML; MG/100ML; MG/100ML; MG/100ML
125 INJECTION, SOLUTION INTRAVENOUS CONTINUOUS
Status: CANCELLED | OUTPATIENT
Start: 2024-02-14

## 2024-02-14 RX ORDER — SODIUM CHLORIDE 9 MG/ML
40 INJECTION, SOLUTION INTRAVENOUS AS NEEDED
Status: CANCELLED | OUTPATIENT
Start: 2024-02-14

## 2024-02-14 RX ORDER — PROMETHAZINE HYDROCHLORIDE 12.5 MG/1
12.5 SUPPOSITORY RECTAL EVERY 6 HOURS PRN
Status: CANCELLED | OUTPATIENT
Start: 2024-02-14

## 2024-02-14 RX ORDER — MISOPROSTOL 100 MCG
25 TABLET ORAL
Status: CANCELLED | OUTPATIENT
Start: 2024-02-14 | End: 2024-02-15

## 2024-02-14 RX ORDER — METHYLERGONOVINE MALEATE 0.2 MG/ML
200 INJECTION INTRAVENOUS AS NEEDED
Status: CANCELLED | OUTPATIENT
Start: 2024-02-14

## 2024-02-14 RX ORDER — MAGNESIUM CARB/ALUMINUM HYDROX 105-160MG
30 TABLET,CHEWABLE ORAL ONCE
Status: DISCONTINUED | OUTPATIENT
Start: 2024-02-14 | End: 2024-02-15 | Stop reason: HOSPADM

## 2024-02-14 RX ORDER — SODIUM CHLORIDE 0.9 % (FLUSH) 0.9 %
10 SYRINGE (ML) INJECTION AS NEEDED
Status: DISCONTINUED | OUTPATIENT
Start: 2024-02-14 | End: 2024-02-15 | Stop reason: HOSPADM

## 2024-02-14 RX ORDER — ACETAMINOPHEN 325 MG/1
650 TABLET ORAL EVERY 4 HOURS PRN
Status: CANCELLED | OUTPATIENT
Start: 2024-02-14

## 2024-02-14 RX ORDER — PROMETHAZINE HYDROCHLORIDE 12.5 MG/1
12.5 TABLET ORAL EVERY 6 HOURS PRN
Status: CANCELLED | OUTPATIENT
Start: 2024-02-14

## 2024-02-14 RX ORDER — OXYTOCIN/0.9 % SODIUM CHLORIDE 30/500 ML
250 PLASTIC BAG, INJECTION (ML) INTRAVENOUS CONTINUOUS
Status: CANCELLED | OUTPATIENT
Start: 2024-02-14 | End: 2024-02-14

## 2024-02-14 RX ORDER — ONDANSETRON 2 MG/ML
4 INJECTION INTRAMUSCULAR; INTRAVENOUS EVERY 6 HOURS PRN
Status: CANCELLED | OUTPATIENT
Start: 2024-02-14

## 2024-02-14 RX ADMIN — SODIUM CHLORIDE, POTASSIUM CHLORIDE, SODIUM LACTATE AND CALCIUM CHLORIDE 125 ML/HR: 600; 310; 30; 20 INJECTION, SOLUTION INTRAVENOUS at 22:15

## 2024-02-15 PROBLEM — O99.019 MATERNAL ANEMIA IN PREGNANCY, ANTEPARTUM: Status: RESOLVED | Noted: 2023-11-28 | Resolved: 2024-02-15

## 2024-02-15 PROBLEM — Z34.83 PRENATAL CARE, SUBSEQUENT PREGNANCY IN THIRD TRIMESTER: Status: RESOLVED | Noted: 2023-07-08 | Resolved: 2024-02-15

## 2024-02-15 PROBLEM — O26.899 HEARTBURN IN PREGNANCY: Status: RESOLVED | Noted: 2023-10-10 | Resolved: 2024-02-15

## 2024-02-15 PROBLEM — R12 HEARTBURN IN PREGNANCY: Status: RESOLVED | Noted: 2023-10-10 | Resolved: 2024-02-15

## 2024-02-15 PROBLEM — O36.63X1 LARGE FOR GESTATIONAL AGE FETUS AFFECTING MANAGEMENT OF MOTHER, ANTEPARTUM, THIRD TRIMESTER, FETUS 1: Status: RESOLVED | Noted: 2024-01-18 | Resolved: 2024-02-15

## 2024-02-15 PROBLEM — N20.0 KIDNEY STONE COMPLICATING PREGNANCY, SECOND TRIMESTER: Status: RESOLVED | Noted: 2023-11-04 | Resolved: 2024-02-15

## 2024-02-15 PROBLEM — O26.832 KIDNEY STONE COMPLICATING PREGNANCY, SECOND TRIMESTER: Status: RESOLVED | Noted: 2023-11-04 | Resolved: 2024-02-15

## 2024-02-15 PROBLEM — Z34.90 ENCOUNTER FOR ELECTIVE INDUCTION OF LABOR: Status: RESOLVED | Noted: 2024-02-02 | Resolved: 2024-02-15

## 2024-02-15 PROBLEM — O99.891 KIDNEY STONE COMPLICATING PREGNANCY, SECOND TRIMESTER: Status: RESOLVED | Noted: 2023-11-04 | Resolved: 2024-02-15

## 2024-02-15 LAB — T PALLIDUM IGG SER QL: NORMAL

## 2024-02-15 PROCEDURE — 0KQM0ZZ REPAIR PERINEUM MUSCLE, OPEN APPROACH: ICD-10-PCS | Performed by: OBSTETRICS & GYNECOLOGY

## 2024-02-15 PROCEDURE — 25810000003 LACTATED RINGERS PER 1000 ML: Performed by: OBSTETRICS & GYNECOLOGY

## 2024-02-15 PROCEDURE — 59400 OBSTETRICAL CARE: CPT | Performed by: OBSTETRICS & GYNECOLOGY

## 2024-02-15 PROCEDURE — 25010000002 METHYLERGONOVINE MALEATE PER 0.2 MG: Performed by: OBSTETRICS & GYNECOLOGY

## 2024-02-15 PROCEDURE — 25010000002 FENTANYL CITRATE (PF) 50 MCG/ML SOLUTION: Performed by: OBSTETRICS & GYNECOLOGY

## 2024-02-15 PROCEDURE — 3E033VJ INTRODUCTION OF OTHER HORMONE INTO PERIPHERAL VEIN, PERCUTANEOUS APPROACH: ICD-10-PCS | Performed by: OBSTETRICS & GYNECOLOGY

## 2024-02-15 RX ORDER — ONDANSETRON 4 MG/1
4 TABLET, ORALLY DISINTEGRATING ORAL EVERY 6 HOURS PRN
Status: DISCONTINUED | OUTPATIENT
Start: 2024-02-15 | End: 2024-02-15 | Stop reason: HOSPADM

## 2024-02-15 RX ORDER — PROMETHAZINE HYDROCHLORIDE 12.5 MG/1
12.5 SUPPOSITORY RECTAL EVERY 6 HOURS PRN
Status: DISCONTINUED | OUTPATIENT
Start: 2024-02-15 | End: 2024-02-15 | Stop reason: HOSPADM

## 2024-02-15 RX ORDER — MORPHINE SULFATE 2 MG/ML
2 INJECTION, SOLUTION INTRAMUSCULAR; INTRAVENOUS
Status: DISCONTINUED | OUTPATIENT
Start: 2024-02-15 | End: 2024-02-15

## 2024-02-15 RX ORDER — HYDROCORTISONE 25 MG/G
1 CREAM TOPICAL AS NEEDED
Status: DISCONTINUED | OUTPATIENT
Start: 2024-02-15 | End: 2024-02-16 | Stop reason: HOSPADM

## 2024-02-15 RX ORDER — OXYTOCIN/0.9 % SODIUM CHLORIDE 30/500 ML
250 PLASTIC BAG, INJECTION (ML) INTRAVENOUS CONTINUOUS
Status: ACTIVE | OUTPATIENT
Start: 2024-02-15 | End: 2024-02-15

## 2024-02-15 RX ORDER — OXYTOCIN/0.9 % SODIUM CHLORIDE 30/500 ML
999 PLASTIC BAG, INJECTION (ML) INTRAVENOUS ONCE
Status: DISCONTINUED | OUTPATIENT
Start: 2024-02-15 | End: 2024-02-15 | Stop reason: HOSPADM

## 2024-02-15 RX ORDER — OXYTOCIN/0.9 % SODIUM CHLORIDE 30/500 ML
125 PLASTIC BAG, INJECTION (ML) INTRAVENOUS CONTINUOUS PRN
Status: DISCONTINUED | OUTPATIENT
Start: 2024-02-15 | End: 2024-02-16

## 2024-02-15 RX ORDER — CARBOPROST TROMETHAMINE 250 UG/ML
250 INJECTION, SOLUTION INTRAMUSCULAR AS NEEDED
Status: DISCONTINUED | OUTPATIENT
Start: 2024-02-15 | End: 2024-02-15 | Stop reason: HOSPADM

## 2024-02-15 RX ORDER — IBUPROFEN 600 MG/1
600 TABLET ORAL EVERY 6 HOURS PRN
Status: DISCONTINUED | OUTPATIENT
Start: 2024-02-15 | End: 2024-02-15 | Stop reason: HOSPADM

## 2024-02-15 RX ORDER — DOCUSATE SODIUM 100 MG/1
100 CAPSULE, LIQUID FILLED ORAL 2 TIMES DAILY
Status: DISCONTINUED | OUTPATIENT
Start: 2024-02-15 | End: 2024-02-16 | Stop reason: HOSPADM

## 2024-02-15 RX ORDER — FENTANYL CITRATE 50 UG/ML
50 INJECTION, SOLUTION INTRAMUSCULAR; INTRAVENOUS
Status: DISCONTINUED | OUTPATIENT
Start: 2024-02-15 | End: 2024-02-16

## 2024-02-15 RX ORDER — BISACODYL 10 MG
10 SUPPOSITORY, RECTAL RECTAL DAILY PRN
Status: DISCONTINUED | OUTPATIENT
Start: 2024-02-16 | End: 2024-02-16 | Stop reason: HOSPADM

## 2024-02-15 RX ORDER — PROMETHAZINE HYDROCHLORIDE 12.5 MG/1
12.5 TABLET ORAL EVERY 6 HOURS PRN
Status: DISCONTINUED | OUTPATIENT
Start: 2024-02-15 | End: 2024-02-15 | Stop reason: HOSPADM

## 2024-02-15 RX ORDER — METHYLERGONOVINE MALEATE 0.2 MG/ML
200 INJECTION INTRAVENOUS AS NEEDED
Status: DISCONTINUED | OUTPATIENT
Start: 2024-02-15 | End: 2024-02-15 | Stop reason: HOSPADM

## 2024-02-15 RX ORDER — MISOPROSTOL 200 UG/1
800 TABLET ORAL AS NEEDED
Status: DISCONTINUED | OUTPATIENT
Start: 2024-02-15 | End: 2024-02-15 | Stop reason: HOSPADM

## 2024-02-15 RX ORDER — IBUPROFEN 600 MG/1
600 TABLET ORAL EVERY 6 HOURS PRN
Status: DISCONTINUED | OUTPATIENT
Start: 2024-02-15 | End: 2024-02-16 | Stop reason: HOSPADM

## 2024-02-15 RX ORDER — ONDANSETRON 2 MG/ML
4 INJECTION INTRAMUSCULAR; INTRAVENOUS EVERY 6 HOURS PRN
Status: DISCONTINUED | OUTPATIENT
Start: 2024-02-15 | End: 2024-02-15 | Stop reason: HOSPADM

## 2024-02-15 RX ORDER — ACETAMINOPHEN 325 MG/1
650 TABLET ORAL EVERY 6 HOURS PRN
Status: DISCONTINUED | OUTPATIENT
Start: 2024-02-15 | End: 2024-02-16 | Stop reason: HOSPADM

## 2024-02-15 RX ORDER — OXYCODONE HYDROCHLORIDE AND ACETAMINOPHEN 5; 325 MG/1; MG/1
1 TABLET ORAL EVERY 4 HOURS PRN
Status: DISCONTINUED | OUTPATIENT
Start: 2024-02-15 | End: 2024-02-15 | Stop reason: HOSPADM

## 2024-02-15 RX ORDER — LIDOCAINE HYDROCHLORIDE 10 MG/ML
INJECTION, SOLUTION EPIDURAL; INFILTRATION; INTRACAUDAL; PERINEURAL
Status: COMPLETED
Start: 2024-02-15 | End: 2024-02-15

## 2024-02-15 RX ADMIN — IBUPROFEN 600 MG: 600 TABLET, FILM COATED ORAL at 11:19

## 2024-02-15 RX ADMIN — SODIUM CHLORIDE, POTASSIUM CHLORIDE, SODIUM LACTATE AND CALCIUM CHLORIDE 125 ML/HR: 600; 310; 30; 20 INJECTION, SOLUTION INTRAVENOUS at 07:14

## 2024-02-15 RX ADMIN — ACETAMINOPHEN 650 MG: 325 TABLET ORAL at 12:49

## 2024-02-15 RX ADMIN — WITCH HAZEL: 500 SOLUTION RECTAL; TOPICAL at 20:03

## 2024-02-15 RX ADMIN — FENTANYL CITRATE 50 MCG: 50 INJECTION, SOLUTION INTRAMUSCULAR; INTRAVENOUS at 05:44

## 2024-02-15 RX ADMIN — FENTANYL CITRATE 50 MCG: 50 INJECTION, SOLUTION INTRAMUSCULAR; INTRAVENOUS at 07:33

## 2024-02-15 RX ADMIN — Medication 1 APPLICATION: at 12:49

## 2024-02-15 RX ADMIN — LIDOCAINE HYDROCHLORIDE 30 ML: 10 INJECTION, SOLUTION EPIDURAL; INFILTRATION; INTRACAUDAL; PERINEURAL at 10:33

## 2024-02-15 RX ADMIN — WITCH HAZEL: 500 SOLUTION RECTAL; TOPICAL at 12:49

## 2024-02-15 RX ADMIN — IBUPROFEN 600 MG: 600 TABLET, FILM COATED ORAL at 23:23

## 2024-02-15 RX ADMIN — Medication: at 12:49

## 2024-02-15 RX ADMIN — ACETAMINOPHEN 650 MG: 325 TABLET ORAL at 20:02

## 2024-02-15 RX ADMIN — HYDROCORTISONE 1 APPLICATION: 25 CREAM TOPICAL at 12:49

## 2024-02-15 RX ADMIN — METHYLERGONOVINE MALEATE 200 MCG: 0.2 INJECTION, SOLUTION INTRAMUSCULAR; INTRAVENOUS at 11:02

## 2024-02-15 RX ADMIN — Medication 2 MILLI-UNITS/MIN: at 05:01

## 2024-02-15 NOTE — H&P (VIEW-ONLY)
Geraldine Enrique  : 1995  MRN: [id number]  CSN: [id number]    History and Physical    Subjective     Geraldine Enrique is a 28 y.o. year old  with an Estimated Date of Delivery: 24 presenting for induction of labor for elective at term per patient request and LGA.  Fetal EFW is LGA and pelvis is clinically adequate.  Ultrasound performed at 36 weeks with estimated fetal weight at the 73rd percentile; abdominal circumference greater than 99th percentile.    Risks of labor induction including prolongation of labor, increased risks for both  section and operative vaginal birth have been discussed at length.     Prenatal care has been with  Dr. Velazquez.  It has been benign otherwise.    OB History    Para Term  AB Living   4 2 2 0 1 2   SAB IAB Ectopic Molar Multiple Live Births   1 0 0 0 0 2      # Outcome Date GA Lbr Britton/2nd Weight Sex Delivery Anes PTL Lv   4 Current            3 SAB 2023           2 Term 19 38w0d  3810 g (8 lb 6.4 oz) M Vag-Spont Local N ANIKA      Name: Lonny      Apgar1: 8  Apgar5: 9   1 Term 17 39w3d 18:45 / 00:17 4300 g (9 lb 7.7 oz) M Vag-Spont None N ANIKA      Complications: Fetal macrosomia      Name: Chase      Apgar1: 8  Apgar5: 9      Obstetric Comments   2017 - Chase   2019 - Lonny     Past Medical History:   Diagnosis Date    Scoliosis 2006    surgically corrected at age 13-13 yo    Kidney stone complicating pregnancy     and      Past Surgical History:   Procedure Laterality Date    TONSILLECTOMY AND ADENOIDECTOMY      CERVICAL CHIARI DECOMPRESSION POSTERIOR  2007    SPINAL GROWTH RODS      for Scoliosis    CYSTOSCOPY URETEROSCOPY LASER LITHOTRIPSY Left 2023    URETEROSCOPY LASER LITHOTRIPSY WITH STENT INSERTION Left 2023    Procedure: CYSTOSCOPY, LEFT URETEROSCOPY, LASER LITHOTRIPSY WITH STENT INSERTION;  Surgeon: Vincenzo Zacarias MD;  Location: [facility];  Service: Urology;  Laterality:  Left;       Current Outpatient Medications:     docusate sodium (Colace) 100 MG capsule, Take 1 capsule by mouth 2 (Two) Times a Day., Disp: 60 capsule, Rfl: 1    ferrous sulfate 325 (65 FE) MG tablet, Take 1 tablet by mouth Daily Before Lunch., Disp: 30 tablet, Rfl: 8    prenatal vitamin (prenatal, CLASSIC, vitamin) tablet, Take 1 tablet by mouth Daily., Disp: , Rfl:     Allergies   Allergen Reactions    Morphine And Related Hives     Social History    Tobacco Use      Smoking status: Never      Smokeless tobacco: Never    Review of Systems   Constitutional:  Negative for unexpected weight change.   Respiratory:  Negative for cough and shortness of breath.    Cardiovascular:  Negative for chest pain.   Gastrointestinal:  Negative for abdominal distention, constipation and diarrhea.        No persistent bloating   Genitourinary:  Negative for difficulty urinating, dysuria, hematuria and urgency.        No significant incontinence   Skin:  Negative for rash.   Neurological:  Negative for headaches.         Objective   LMP 05/01/2023     General: well developed; well nourished  no acute distress   Abdomen: soft, non-tender; no masses  no umbilical or inguinal hernias are present  no hepato-splenomegaly   Cervix: At last prenatal visit - 1 cm / 30 % / -2 / medium / middle   Presentation: At last prenatal visit - cephalic     Prenatal Labs  Lab Results   Component Value Date    HGB 10.0 (L) 11/28/2023    RUBELLAABIGG 3.63 06/26/2023    HEPBSAG Non-Reactive 06/26/2023    ABSCRN Negative 11/04/2023    EXH6DAT2 Non-Reactive 06/26/2023    HEPCVIRUSABY Non-Reactive 06/26/2023     11/28/2023    STREPGPB negative 01/25/2024    URINECX No growth 11/04/2023    CHLAMNAA Negative 06/26/2023    NGONORRHON Negative 06/26/2023            Assessment   IUP @ 39w5d  Induction of labor because of elective at term per patient request and LGA (pelvis previously proven to 9 pounds 8 ounces)  Group B strep status:  negative  Unfavorable cervix       Plan   Cervical suarez and Cytotec upon admission  Pitocin induction in am if not in labor already  Explained the potential for this to be a serial day process      Louie Velazquez MD

## 2024-02-15 NOTE — PROCEDURES
28 y.o.  OB History          4    Para   2    Term   2       0    AB   1    Living   2         SAB   1    IAB   0    Ectopic   0    Molar   0    Multiple   0    Live Births   2          Obstetric Comments   2017 - Chase  2019 - Lonny            Presents at 39 6/7 weeks as an induction of labour due to unfavourable cervix   Her primary OB requests a Olvera Bulb placement to initiate the induction of labour.    Fetal Heart Rate Assessment   Method: Fetal HR Assessment Method: external   Beats/min: Fetal HR (beats/min): 125   Baseline: Fetal HR Baseline: indeterminate   Varibility: Fetal HR Variability: moderate (amplitude range 6 to 25 bpm)   Accels: Fetal HR Accelerations: greater than/equal to 15 bpm, lasting at least 15 seconds   Decels: Fetal HR Decelerations: absent   Tracing Category:       TOCO:  Irregular   SVE: 2/70/-2    A Olvera Bulb was placed without difficulties with 60 cc of sterile saline.  The patient tolerated the procedure well.

## 2024-02-15 NOTE — L&D DELIVERY NOTE
[facility]  Vaginal Delivery Note    Delivery details     Delivery: Vaginal, Spontaneous     YOB: 2026    Time of Birth: 9:53 AM      Anesthesia: Local     Delivering clinician: Louie Velazquez    Forceps?   No   Vacuum? No    Hospital Problem list: Encounter for elective induction of labor  LGA fetus  Nuchal cord   Shoulder dystocia present: No      Delivery narrative: I was called for precipitous delivery.  Upon my arrival had had crowned and nurse was at the bedside.  Nuchal cord was present which was reduced.  At this point I assumed care.  Right shoulder was anterior.  Shoulder dystocia was not present.  Spontaneous delivery.  Mouth and nose bulb suctioned after delivery.  After 30 seconds of delay cord clamped and infant placed on maternal abdomen.  After obtaining cord blood and cord segment placenta delivered spontaneously intact with normal three-vessel cord.  Second-degree midline perineal laceration present along with bilateral hemostatic periurethral lacerations.  Local anesthesia infiltrated into the perineum and repair done in standard fashion with 3-0 chromic.    Infant details    Findings: male  infant     Infant observations: Weight: 4140 g (9 lb 2 oz)   Length: 20.25  in   Apgars: 7  @ 1 minute /    9  @ 5 minutes     Placenta, Cord, and Fluid    Placenta delivered  Spontaneous  at   2/15/2024 10:00 AM     Cord: 3 vessels  present.   Nuchal Cord?  yes; Number of nuchal loops present:  1    Cord blood obtained: Yes      Repair    Episiotomy: None    Estimated Blood Loss: Est. Blood Loss (mL): 250 mL (Filed from Delivery Summary) (02/15/24 6153)       Complications  none    Disposition  Mother to Mother Baby/Postpartum  in stable condition currently.  Baby to NBN  in stable condition currently.      Louie Velazquez MD  02/15/24  11:01 EST

## 2024-02-15 NOTE — LACTATION NOTE
02/15/24 1510   Maternal Information   Date of Referral 02/15/24   Person Making Referral lactation consultant   Maternal Reason for Referral   (courtesy visit for new delivery. Hx: BF 1st child for 18mos, 2nd child for 15 mos. Pt states this baby is doing very well & recently fed at 1430. Encouraged her to call out for a latch check with next feeding.)   Maternal Infant Feeding   Maternal Emotional State independent;receptive   Latch Assistance none needed   Support Person Involvement actively supporting mother   Milk Expression/Equipment   Breast Pump Type double electric, personal  (pt states she has a Medela pump for home use. Encouraged her to pump for short or missed feedings.)

## 2024-02-16 VITALS
WEIGHT: 218 LBS | RESPIRATION RATE: 16 BRPM | HEART RATE: 65 BPM | TEMPERATURE: 97.7 F | HEIGHT: 66 IN | SYSTOLIC BLOOD PRESSURE: 116 MMHG | DIASTOLIC BLOOD PRESSURE: 71 MMHG | BODY MASS INDEX: 35.03 KG/M2

## 2024-02-16 LAB
HCT VFR BLD AUTO: 24.6 % (ref 34–46.6)
HGB BLD-MCNC: 7.3 G/DL (ref 12–15.9)

## 2024-02-16 PROCEDURE — 85018 HEMOGLOBIN: CPT | Performed by: OBSTETRICS & GYNECOLOGY

## 2024-02-16 PROCEDURE — 85014 HEMATOCRIT: CPT | Performed by: OBSTETRICS & GYNECOLOGY

## 2024-02-16 RX ORDER — IBUPROFEN 200 MG
200-600 TABLET ORAL EVERY 6 HOURS PRN
Start: 2024-02-16 | End: 2024-02-26

## 2024-02-16 RX ADMIN — Medication 1 APPLICATION: at 10:27

## 2024-02-16 RX ADMIN — WITCH HAZEL: 500 SOLUTION RECTAL; TOPICAL at 10:27

## 2024-02-16 RX ADMIN — DOCUSATE SODIUM 100 MG: 100 CAPSULE, LIQUID FILLED ORAL at 08:46

## 2024-02-16 RX ADMIN — ACETAMINOPHEN 650 MG: 325 TABLET ORAL at 04:36

## 2024-02-16 RX ADMIN — IBUPROFEN 600 MG: 600 TABLET, FILM COATED ORAL at 14:30

## 2024-02-16 RX ADMIN — Medication: at 10:26

## 2024-02-16 RX ADMIN — HYDROCORTISONE 1 APPLICATION: 25 CREAM TOPICAL at 10:26

## 2024-02-16 RX ADMIN — IBUPROFEN 600 MG: 600 TABLET, FILM COATED ORAL at 08:46

## 2024-02-16 NOTE — DISCHARGE SUMMARY
Discharge Summary     Michel Enrique  : 1995  MRN: [id number]  CSN: [id number]    Date of Admission: 2024   Date of Discharge:  2024   Delivering Physician: Louie Velazquez        Admission Diagnosis: Encounter for elective induction of labor [Z34.90]   Discharge Diagnosis: Same as above plus  Pregnancy at 39w6d - delivered  Postpartum anemia   D/C Hospital Problem List: Postpartum care following  2/15/24 - [place]  Postpartum anemia         Procedures: 2/15/2024  - Vaginal, Spontaneous       Hospital Course  Patient is a 28 y.o.  who at 39w6d had a vaginal birth.  Her postpartum course was without complications.  On PPD # 1 she was ready for discharge.  She had normal lochia and pain well controlled with oral medications.    Infant  male  fetus weighing 4140 g (9 lb 2 oz)   Apgars -  7 @ 1 minute /  9 @ 5 minutes.    Discharge labs  Lab Results   Component Value Date    WBC 6.12 2024    HGB 7.3 (L) 2024    HCT 24.6 (L) 2024     2024       Discharge Medications     Discharge Medications        New Medications        Instructions Start Date   ibuprofen 200 MG tablet  Commonly known as: ADVIL,MOTRIN   200-600 mg, Oral, Every 6 Hours PRN             Continue These Medications        Instructions Start Date   docusate sodium 100 MG capsule  Commonly known as: Colace   100 mg, Oral, 2 Times Daily      ferrous sulfate 325 (65 FE) MG tablet   325 mg, Oral, Daily Before Lunch      prenatal (CLASSIC) vitamin 28-0.8 MG tablet tablet  Generic drug: prenatal vitamin   1 tablet, Oral, Daily               Discharge Disposition    Condition on Discharge: good   Follow-up: 6 weeks with Dr. Caroline Velazquez MD  2024

## 2024-02-16 NOTE — PLAN OF CARE
Goal Outcome Evaluation:  Plan of Care Reviewed With: patient        Progress: improving  Outcome Evaluation: lochia and vs wnl, pain controlled, bonding well w/nb

## 2024-02-16 NOTE — PROGRESS NOTES
JOSUE Pearson  Geraldine Enrique  : 1995  MRN: [id number]  CSN: [id number]    Hospital Day: 3    Postpartum Day #1  Subjective     CC: hospital follow-up    Her pain is well controlled.  Vaginal bleeding is less than a normal period.  She would like to go home if it is okay with pediatrics     Objective     Min/max vitals past 24 hours:   Temp  Min: 97.8 °F (36.6 °C)  Max: 99 °F (37.2 °C)  BP  Min: 118/74  Max: 163/85  Pulse  Min: 54  Max: 86  Resp  Min: 16  Max: 18        Abdomen: soft, non-tender; bowel sounds normal; no masses   fundus firm and non-tender   Pelvic: deferred     Results from last 7 days   Lab Units 24  0424 24  2220   WBC 10*3/mm3  --  6.12   HEMOGLOBIN g/dL 7.3* 9.2*   HEMATOCRIT % 24.6* 29.9*   PLATELETS 10*3/mm3  --  219     Lab Results   Component Value Date    RH Positive 2024    HEPBSAG Non-Reactive 2023     Results from last 7 days   Lab Units 24  2220   TREPONEMA PALLIDUM AB TOTAL  Non-Reactive          Assessment   Postpartum Day #1 S/P vaginal delivery  Postpartum anemia     Plan   Discharge to home  Explained importance of prenatal vitamins and iron for 3 months with continue prenatal vitamins beyond 3 months if breast-feeds  Circumcision was discussed.  It was explained to Geraldine that the procedure is elective.  There are probably no long-term medical benefits for circumcision.  Studies have suggested in the first year of life, there may be a reduction in urinary tract infections in a circumcised male's.  This difference disappears after the first year of life.  Some have suggested that circumcision reduces nerve endings and the tip of the penis which could have an impact to him as he ages.  There are small risks of the procedure including taking off to much or too little skin from the foreskin.  He will be anesthetized and most of the time this is successful.  It cannot be guaranteed that the child will feel no pain.  After hearing this  information, she wishes to have her son circumsized.    Louie Velazquez MD  2/16/2024  07:55 EST

## 2024-02-22 ENCOUNTER — HOSPITAL ENCOUNTER (OUTPATIENT)
Facility: HOSPITAL | Age: 29
Discharge: HOME | End: 2024-02-22
Attending: OBSTETRICS & GYNECOLOGY | Admitting: OBSTETRICS & GYNECOLOGY
Payer: COMMERCIAL

## 2024-02-22 ENCOUNTER — TELEPHONE (OUTPATIENT)
Dept: OBSTETRICS AND GYNECOLOGY | Facility: CLINIC | Age: 29
End: 2024-02-22
Payer: COMMERCIAL

## 2024-02-22 VITALS
RESPIRATION RATE: 18 BRPM | SYSTOLIC BLOOD PRESSURE: 143 MMHG | DIASTOLIC BLOOD PRESSURE: 87 MMHG | TEMPERATURE: 98.8 F | HEART RATE: 116 BPM | HEIGHT: 66 IN | BODY MASS INDEX: 35.19 KG/M2 | OXYGEN SATURATION: 98 %

## 2024-02-22 LAB
ALP SERPL-CCNC: 88 U/L (ref 39–117)
ALT SERPL W P-5'-P-CCNC: 12 U/L (ref 1–33)
AST SERPL-CCNC: 12 U/L (ref 1–32)
BASOPHILS # BLD AUTO: 0.03 10*3/MM3 (ref 0–0.2)
BASOPHILS NFR BLD AUTO: 0.4 % (ref 0–1.5)
BILIRUB SERPL-MCNC: 0.3 MG/DL (ref 0–1.2)
CREAT SERPL-MCNC: 0.53 MG/DL (ref 0.57–1)
DEPRECATED RDW RBC AUTO: 38.9 FL (ref 37–54)
EOSINOPHIL # BLD AUTO: 0.12 10*3/MM3 (ref 0–0.4)
EOSINOPHIL NFR BLD AUTO: 1.5 % (ref 0.3–6.2)
ERYTHROCYTE [DISTWIDTH] IN BLOOD BY AUTOMATED COUNT: 15.1 % (ref 12.3–15.4)
EXPIRATION DATE: ABNORMAL
HCT VFR BLD AUTO: 28.1 % (ref 34–46.6)
HGB BLD-MCNC: 8.2 G/DL (ref 12–15.9)
IMM GRANULOCYTES # BLD AUTO: 0.03 10*3/MM3 (ref 0–0.05)
IMM GRANULOCYTES NFR BLD AUTO: 0.4 % (ref 0–0.5)
LDH SERPL-CCNC: 227 U/L (ref 135–214)
LYMPHOCYTES # BLD AUTO: 1.45 10*3/MM3 (ref 0.7–3.1)
LYMPHOCYTES NFR BLD AUTO: 18.4 % (ref 19.6–45.3)
Lab: ABNORMAL
MCH RBC QN AUTO: 20.9 PG (ref 26.6–33)
MCHC RBC AUTO-ENTMCNC: 29.2 G/DL (ref 31.5–35.7)
MCV RBC AUTO: 71.5 FL (ref 79–97)
MONOCYTES # BLD AUTO: 0.77 10*3/MM3 (ref 0.1–0.9)
MONOCYTES NFR BLD AUTO: 9.8 % (ref 5–12)
NEUTROPHILS NFR BLD AUTO: 5.47 10*3/MM3 (ref 1.7–7)
NEUTROPHILS NFR BLD AUTO: 69.5 % (ref 42.7–76)
NRBC BLD AUTO-RTO: 0 /100 WBC (ref 0–0.2)
PLATELET # BLD AUTO: 250 10*3/MM3 (ref 140–450)
PMV BLD AUTO: 10.1 FL (ref 6–12)
PROT UR STRIP-MCNC: ABNORMAL MG/DL
RBC # BLD AUTO: 3.93 10*6/MM3 (ref 3.77–5.28)
URATE SERPL-MCNC: 5.1 MG/DL (ref 2.4–5.7)
WBC NRBC COR # BLD AUTO: 7.87 10*3/MM3 (ref 3.4–10.8)

## 2024-02-22 PROCEDURE — 82247 BILIRUBIN TOTAL: CPT | Performed by: OBSTETRICS & GYNECOLOGY

## 2024-02-22 PROCEDURE — 84075 ASSAY ALKALINE PHOSPHATASE: CPT | Performed by: OBSTETRICS & GYNECOLOGY

## 2024-02-22 PROCEDURE — 81002 URINALYSIS NONAUTO W/O SCOPE: CPT | Performed by: OBSTETRICS & GYNECOLOGY

## 2024-02-22 PROCEDURE — 83615 LACTATE (LD) (LDH) ENZYME: CPT | Performed by: OBSTETRICS & GYNECOLOGY

## 2024-02-22 PROCEDURE — 85025 COMPLETE CBC W/AUTO DIFF WBC: CPT | Performed by: OBSTETRICS & GYNECOLOGY

## 2024-02-22 PROCEDURE — 84450 TRANSFERASE (AST) (SGOT): CPT | Performed by: OBSTETRICS & GYNECOLOGY

## 2024-02-22 PROCEDURE — 82565 ASSAY OF CREATININE: CPT | Performed by: OBSTETRICS & GYNECOLOGY

## 2024-02-22 PROCEDURE — 36415 COLL VENOUS BLD VENIPUNCTURE: CPT | Performed by: OBSTETRICS & GYNECOLOGY

## 2024-02-22 PROCEDURE — 84460 ALANINE AMINO (ALT) (SGPT): CPT | Performed by: OBSTETRICS & GYNECOLOGY

## 2024-02-22 PROCEDURE — 84550 ASSAY OF BLOOD/URIC ACID: CPT | Performed by: OBSTETRICS & GYNECOLOGY

## 2024-02-22 RX ORDER — NIFEDIPINE 30 MG/1
30 TABLET, EXTENDED RELEASE ORAL DAILY
Qty: 30 TABLET | Refills: 1 | Status: SHIPPED | OUTPATIENT
Start: 2024-02-22

## 2024-02-22 RX ORDER — NIFEDIPINE 10 MG/1
10 CAPSULE ORAL ONCE
Status: COMPLETED | OUTPATIENT
Start: 2024-02-22 | End: 2024-02-22

## 2024-02-22 RX ADMIN — NIFEDIPINE 10 MG: 10 CAPSULE ORAL at 16:13

## 2024-02-22 NOTE — H&P
H&P    Patient Name: Geraldine Enrique  : 1995  MRN: [id number]  Date of Service: 2024  Referring Provider: Louie Velazquez     ID: 28 y.o.  PPD#7    HPI:  Pt states that she has not felt well since her milk came in and had a low temperature of 100 yesterday with some back pain all now resolved but has a home BP cuff and had a BP at home of 150/100 last night at midnight.  Pt says she has a mild headache and feels overall better today than she did yesterday but wanted to be evaluated.  Lochia is less than yesterday.    Chief complaint:   High blood pressure postpartum    ROS:  no dysuria, no warm red hard areas in breast, no chest pain, no SOB, no current abdominal pain, some vaginal pain since delivery, no current back pain   OB HISTORY    OB History          4    Para   3    Term   3       0    AB   1    Living   3         SAB   1    IAB   0    Ectopic   0    Molar   0    Multiple   0    Live Births   3          Obstetric Comments   2017 - [facility]   - [facility]   - [facility]             PAST MEDICAL HISTORY    Past Medical History:   Diagnosis Date    Kidney stone complicating pregnancy     and     Scoliosis 2006    surgically corrected at age 13-15 yo     PAST SURGICAL HISTORY     has a past surgical history that includes Cervical Conization (); Tonsillectomy and adenoidectomy (); Spinal growth rods (); cystoscopy ureteroscopy laser lithotripsy (Left, 2023); and ureteroscopy laser lithotripsy with stent insertion (Left, 2023).  CURRENT MEDICATIONS    No current facility-administered medications on file prior to encounter.     Current Outpatient Medications on File Prior to Encounter   Medication Sig Dispense Refill    ferrous sulfate 325 (65 FE) MG tablet Take 1 tablet by mouth Daily Before Lunch. 30 tablet 8    prenatal vitamin (prenatal, CLASSIC, vitamin) tablet Take 1 tablet by mouth Daily.      docusate sodium (Colace) 100 MG capsule  Take 1 capsule by mouth 2 (Two) Times a Day. 60 capsule 1    ibuprofen (ADVIL,MOTRIN) 200 MG tablet Take 1-3 tablets by mouth Every 6 (Six) Hours As Needed for Mild Pain (First Line: Mild pain.) for up to 10 days.       ALLERGIES    Morphine and related  SOCIAL HISTORY    Social History     Tobacco Use   Smoking Status Never    Passive exposure: Never   Smokeless Tobacco Never     Social History     Substance and Sexual Activity   Alcohol Use No     Social History     Substance and Sexual Activity   Drug Use No     FAMILY HISTORY  The patient has a family history of    PHYSICAL EXAMINATION    Vital Signs Range for the last 24 hours  Temperature: Temp:  [98.8 °F (37.1 °C)] 98.8 °F (37.1 °C)   Temp Source: Temp src: Oral   BP: BP: (136-148)/(91-97) 139/91   Pulse: Heart Rate:  [103-116] 105   Respirations: Resp:  [18] 18   Weight:       Constitutional:  Well developed, well nourished, no acute distress, well-groomed.  HEENT: Grossly normal.  Respiratory:  Unlabored, normal breath sounds.   Cardiovascular:  Normal rate and rhythm  Abdomen:  Soft,nontender.  No CVAT  Neurologic:  Alert & oriented x 4,  no focal deficits noted.   Psychiatric:  Speech and behavior appropriate.   Extremities: no cyanosis, clubbing or edema, no evidence of DVT.      ELabs:      Lab Results (last 24 hours)       Procedure Component Value Units Date/Time    CBC & Differential [[id number]]  (Abnormal) Collected: 02/22/24 1501    Specimen: Blood Updated: 02/22/24 1606    Narrative:      The following orders were created for panel order CBC & Differential.  Procedure                               Abnormality         Status                     ---------                               -----------         ------                     CBC Auto Differential[[id number]]        Abnormal            Final result               Scan Slide[[id number]]                                                                    Please view results for these tests on the  individual orders.    CBC Auto Differential [[id number]]  (Abnormal) Collected: 24 1501    Specimen: Blood Updated: 24 1606     WBC 7.87 10*3/mm3      RBC 3.93 10*6/mm3      Hemoglobin 8.2 g/dL      Hematocrit 28.1 %      MCV 71.5 fL      MCH 20.9 pg      MCHC 29.2 g/dL      RDW 15.1 %      RDW-SD 38.9 fl      MPV 10.1 fL      Platelets 250 10*3/mm3      Neutrophil % 69.5 %      Lymphocyte % 18.4 %      Monocyte % 9.8 %      Eosinophil % 1.5 %      Basophil % 0.4 %      Immature Grans % 0.4 %      Neutrophils, Absolute 5.47 10*3/mm3      Lymphocytes, Absolute 1.45 10*3/mm3      Monocytes, Absolute 0.77 10*3/mm3      Eosinophils, Absolute 0.12 10*3/mm3      Basophils, Absolute 0.03 10*3/mm3      Immature Grans, Absolute 0.03 10*3/mm3      nRBC 0.0 /100 WBC     Preeclampsia Panel [[id number]]  (Abnormal) Collected: 24 1501    Specimen: Blood Updated: 24 1538     Alkaline Phosphatase 88 U/L      ALT (SGPT) 12 U/L      AST (SGOT) 12 U/L      Creatinine 0.53 mg/dL      Total Bilirubin 0.3 mg/dL       U/L      Uric Acid 5.1 mg/dL     POC Protein, Urine, Qualitative, Dipstick [[id number]]  (Abnormal) Collected: 24 1504    Specimen: Urine Updated: 24 1505     Protein, POC Trace mg/dL      Lot Number [id number]     Expiration Date 2025            ASSESSMENT/PLAN:  28 y.o. female  postpartum hypertension, postpartum anemia    Patient Active Problem List    Diagnosis     Postpartum anemia [O90.81]     Postpartum care following  2/15/24 - [facility] [Z39.2]     Mixed hyperlipidemia - high LDL and low HDL [E78.2]     Annual GYN exam w/o problem [Z01.419]      Trial of nifedipine 10 mg x 1 here  Pt is reliable and wants to go home on Procardia XL and return to clinic next week for BP check.  Labs are normal.  Preeclampsia precautions given   Approximately 25 minutes minutes floor time was spent in care of this patient, of which greater than 50% was spent in face-to-face  consultation and coordination of care.    Allyssa Roberts MD  2/22/2024  16:20 EST    Wrote a script for #30 with 1 refill of Procardia XR 30 mg to [facility]  Pt agrees to see Dr. Velazquez early next week for BP check  Pt agrees to RTC if she has any concerns or condition worsens

## 2024-02-22 NOTE — TELEPHONE ENCOUNTER
JOSÉ LUIS      Patient called stating that she is 7 days postpartum, is having headaches and is having high blood pressure 150/100.  Please Advise

## 2024-02-26 ENCOUNTER — MATERNAL SCREENING (OUTPATIENT)
Dept: CALL CENTER | Facility: HOSPITAL | Age: 29
End: 2024-02-26
Payer: COMMERCIAL

## 2024-02-26 NOTE — OUTREACH NOTE
Maternal Screening Survey      Flowsheet Row Responses   Facility patient discharged from? [facility]   Attempt successful? No   Unsuccessful attempts Attempt 1              ZITA MANNING - Registered Nurse

## 2024-02-26 NOTE — OUTREACH NOTE
Maternal Screening Survey      Flowsheet Row Responses   Facility patient discharged from? [facility]   Attempt successful? No   Unsuccessful attempts Attempt 2              ZITA MANNING - Registered Nurse

## 2024-02-27 ENCOUNTER — MATERNAL SCREENING (OUTPATIENT)
Dept: CALL CENTER | Facility: HOSPITAL | Age: 29
End: 2024-02-27
Payer: COMMERCIAL

## 2024-02-27 NOTE — OUTREACH NOTE
Maternal Screening Survey      Flowsheet Row Responses   Facility patient discharged from? [facility]   Attempt successful? Yes   Call start time 1326   Call end time 1328   EPD Scale: Able to Laugh 0-->as much as she always could   EPD Scale: Looked Forward 0-->as much as she ever did   EPD Scale: Blamed Self 0-->no, never   EPD Scale: Been Anxious 0-->no, not at all   EPD Scale: Felt Panicky 0-->no, not at all   EPD Scale: Things Getting on Top 0-->no, has been coping as well as ever   EPD Scale: Difficulty Sleeping 0-->no, not at all   EPD Scale: Sad or Miserable 0-->no, not at all   EPD Scale: Crying 0-->no, never   EPD Scale: Thought of Harming Self 0-->never   [place]  Depression Score 0   Did any of your parents have problems with alcohol or drug use? No   Do any of your peers have problems with alcohol or drug use? No   Does your partner have problems with alcohol or drug use? No   Before you were pregnant did you have problems with alcohol or drug use? (past) No   In the past month, did you drink beer, wine, liquor or use any other drugs? (pregnancy) No   Maternal Screening call completed Yes   Call end time 1328              Mojgan MALDONADO - Registered Nurse

## 2024-03-29 ENCOUNTER — POSTPARTUM VISIT (OUTPATIENT)
Dept: OBSTETRICS AND GYNECOLOGY | Facility: CLINIC | Age: 29
End: 2024-03-29
Payer: COMMERCIAL

## 2024-03-29 ENCOUNTER — LAB (OUTPATIENT)
Dept: LAB | Facility: HOSPITAL | Age: 29
End: 2024-03-29
Payer: COMMERCIAL

## 2024-03-29 VITALS
SYSTOLIC BLOOD PRESSURE: 122 MMHG | BODY MASS INDEX: 30.02 KG/M2 | WEIGHT: 186 LBS | RESPIRATION RATE: 14 BRPM | DIASTOLIC BLOOD PRESSURE: 68 MMHG

## 2024-03-29 DIAGNOSIS — N81.4 UTERINE PROLAPSE: ICD-10-CM

## 2024-03-29 LAB
FERRITIN SERPL-MCNC: 5.8 NG/ML (ref 13–150)
HCT VFR BLD AUTO: 33.4 % (ref 34–46.6)
HGB BLD-MCNC: 9.9 G/DL (ref 12–15.9)

## 2024-03-29 PROCEDURE — 36415 COLL VENOUS BLD VENIPUNCTURE: CPT

## 2024-03-29 PROCEDURE — 82728 ASSAY OF FERRITIN: CPT

## 2024-03-29 PROCEDURE — 85018 HEMOGLOBIN: CPT

## 2024-03-29 PROCEDURE — 0503F POSTPARTUM CARE VISIT: CPT | Performed by: OBSTETRICS & GYNECOLOGY

## 2024-03-29 PROCEDURE — 85014 HEMATOCRIT: CPT

## 2024-05-20 ENCOUNTER — HOSPITAL ENCOUNTER (OUTPATIENT)
Dept: ULTRASOUND IMAGING | Facility: HOSPITAL | Age: 29
Discharge: HOME | End: 2024-05-20
Admitting: UROLOGY
Payer: COMMERCIAL

## 2024-05-20 DIAGNOSIS — N20.1 URETERAL STONE: ICD-10-CM

## 2024-05-20 PROCEDURE — 76775 US EXAM ABDO BACK WALL LIM: CPT

## 2024-05-29 ENCOUNTER — OFFICE VISIT (OUTPATIENT)
Dept: UROLOGY | Facility: CLINIC | Age: 29
End: 2024-05-29
Payer: COMMERCIAL

## 2024-05-29 VITALS — BODY MASS INDEX: 29.73 KG/M2 | WEIGHT: 185 LBS | HEIGHT: 66 IN

## 2024-05-29 DIAGNOSIS — N20.1 URETERAL STONE: Primary | ICD-10-CM

## 2024-05-29 NOTE — PROGRESS NOTES
Follow Up Office Visit      Patient Name: Geraldine Enrique  : 1995   MRN: [id number]     Chief Complaint:    Chief Complaint   Patient presents with    Nephrolithiasis       Referring Provider: No ref. provider found    History of Present Illness: Geraldine Enrique is a 28 y.o. female who presents today for follow up of nephrolithiasis.  She has done well since her last visit.  She reports no flank pain or discomfort.  NO dysuria or gross hematuria.      I reviewed her ultrasound which showed no stone or hydronephrosis.     Subjective      Review of System:   Review of Systems   Constitutional:  Negative for chills, fatigue, fever and unexpected weight change.   HENT:  Negative for congestion, rhinorrhea and sore throat.    Eyes:  Negative for visual disturbance.   Respiratory:  Negative for apnea, cough, chest tightness and shortness of breath.    Cardiovascular:  Negative for chest pain.   Gastrointestinal:  Negative for abdominal pain, constipation, diarrhea, nausea and vomiting.   Endocrine: Negative for polydipsia and polyuria.   Genitourinary:  Negative for difficulty urinating, dysuria, enuresis, flank pain, frequency, genital sores, hematuria and urgency.   Musculoskeletal:  Negative for gait problem.   Skin:  Negative for rash and wound.   Allergic/Immunologic: Negative for immunocompromised state.   Neurological:  Negative for dizziness, tremors, syncope, weakness and light-headedness.   Hematological:  Does not bruise/bleed easily.      I have reviewed the ROS documented by my clinical staff, I have updated appropriately and I agree. Vincenzo Zacarias MD    I have reviewed and the following portions of the patient's history were updated as appropriate: past family history, past medical history, past social history, past surgical history and problem list.    Past Medical History:   Past Medical History:   Diagnosis Date    Kidney stone complicating pregnancy     and      Scoliosis 2006    surgically corrected at age 13-15 yo       Past Surgical History:  Past Surgical History:   Procedure Laterality Date    CERVICAL CHIARI DECOMPRESSION POSTERIOR  2007    CYSTOSCOPY URETEROSCOPY LASER LITHOTRIPSY Left 11/04/2023    SPINAL GROWTH RODS  2007    for Scoliosis    TONSILLECTOMY AND ADENOIDECTOMY  1995    URETEROSCOPY LASER LITHOTRIPSY WITH STENT INSERTION Left 11/4/2023    Procedure: CYSTOSCOPY, LEFT URETEROSCOPY, LASER LITHOTRIPSY WITH STENT INSERTION;  Surgeon: Vincenzo Zacarias MD;  Location: [facility];  Service: Urology;  Laterality: Left;       Family History:   family history includes Diabetes in her sister; Hypertension in her maternal grandmother and mother.   Otherwise pertinent FHx was reviewed and not pertinent to current issue.    Social History:    reports that she has never smoked. She has never been exposed to tobacco smoke. She has never used smokeless tobacco. She reports that she does not drink alcohol and does not use drugs.    Medications:     Current Outpatient Medications:     prenatal vitamin (prenatal, CLASSIC, vitamin) tablet, Take 1 tablet by mouth Daily., Disp: , Rfl:     Allergies:   Allergies   Allergen Reactions    Morphine And Codeine Hives     Bladder & Bowel Symptom Questionnaire    How often do you usually urinate during the day ?   1 - About every 3-4 hours   2.   How many timed do you urinate at night?   0 - 0-1 time at night   3.   What is the reason that you usually urinate?   1 - Mild urge (can delay over an hour)   4.   Once you get the urge to go, how long can you     comfortably delay?   1 - 30-60 min   5.   How often do you get a sudden urge that makes you rush to the bathroom?   1 - Rarely   6.   How often does a sudden urge to urinate result in you leaking urine or wetting pads?   1 - Rarely   7.  In your opinion, how good is your bladder control?   1 - Very Good   8.  Do you have accidental bowel leakage?   no   9.  Do you have difficulty fully  "emptying your bladder?   no   10.  Do you experience accidental leakage when performing some physical activity such as coughing, sneezing, laughing or exercise?   yes   11. Have you tried medications to help improve your symptoms?   no   12. Would you be interested in learning about a long-lasting option that may help you with your symptoms?   no                                                                             Total Score   6     0-7 (Mild) 8-16 (Moderate) 17-28 (Severe)       Objective     Physical Exam:   Vital Signs:   Vitals:    05/29/24 1511   Weight: 83.9 kg (185 lb)   Height: 167.6 cm (66\")   PainSc: 0-No pain     Body mass index is 29.86 kg/m².     Physical Exam  Vitals and nursing note reviewed. Exam conducted with a chaperone present.   Constitutional:       General: She is awake. She is not in acute distress.     Appearance: Normal appearance.   HENT:      Head: Normocephalic and atraumatic.      Right Ear: External ear normal.      Left Ear: External ear normal.      Nose: Nose normal.   Eyes:      Conjunctiva/sclera: Conjunctivae normal.   Pulmonary:      Effort: Pulmonary effort is normal. No respiratory distress.   Abdominal:      General: Abdomen is flat. There is no distension.      Palpations: Abdomen is soft. There is no mass.      Tenderness: There is no abdominal tenderness. There is no right CVA tenderness, left CVA tenderness, guarding or rebound.      Hernia: No hernia is present.   Genitourinary:     Exam position: Lithotomy position.   Skin:     General: Skin is warm.   Neurological:      General: No focal deficit present.      Mental Status: She is alert and oriented to person, place, and time.      Gait: Gait normal.   Psychiatric:         Behavior: Behavior normal. Behavior is cooperative.         Thought Content: Thought content normal.         Judgment: Judgment normal.         Labs:   Brief Urine Lab Results  (Last result in the past 365 days)        Color   Clarity   Blood " "  Leuk Est   Nitrite   Protein   CREAT   Urine HCG        02/22/24 1504           Trace                   Urine Culture          6/26/2023    15:48 11/4/2023    06:49 11/4/2023    16:39   Urine Culture   Urine Culture No growth  No growth  No growth         Lab Results   Component Value Date    GLUCOSE 93 11/04/2023    CALCIUM 8.4 (L) 11/04/2023     11/04/2023    K 3.8 11/04/2023    CO2 21.0 (L) 11/04/2023     11/04/2023    BUN 6 11/04/2023    CREATININE 0.53 (L) 02/22/2024    EGFRIFAFRI >60 09/22/2021    EGFRIFNONA >60 09/22/2021    BCR 10.2 11/04/2023    ANIONGAP 11.0 11/04/2023       Lab Results   Component Value Date    WBC 7.87 02/22/2024    HGB 9.9 (L) 03/29/2024    HCT 33.4 (L) 03/29/2024    MCV 71.5 (L) 02/22/2024     02/22/2024       No results found for: \"PSA\"    Images:   US Renal Bilateral    Result Date: 5/21/2024  Impression: Unremarkable renal ultrasound Electronically Signed: Chandler Goncalves MD  5/21/2024 10:01 AM EDT  Workstation ID: [id number]      Measures:   Tobacco:   Geraldine Enrique  reports that she has never smoked. She has never been exposed to tobacco smoke. She has never used smokeless tobacco.       Urine Incontinence: Patient reports that she is not currently experiencing any symptoms of urinary incontinence.         Assessment / Plan      Assessment/Plan:   28 y.o. female who presented today for follow up of nephrolithiasis.  We discussed fluid intake and stone prevention strategies.  I will see her back in 1 year.      There are no diagnoses linked to this encounter.     Follow Up:   Return in about 1 year (around 5/29/2025) for Recheck.    I spent approximately 20 minutes providing clinical care for this patient; including review of patient's chart and provider documentation, face to face time spent with patient in examination room (obtaining history, performing physical exam, discussing diagnosis and management options), placing orders, and completing patient " documentation.     Vincenzo Zacarias MD  [facility]

## 2025-01-02 ENCOUNTER — TELEPHONE (OUTPATIENT)
Dept: OBSTETRICS AND GYNECOLOGY | Facility: CLINIC | Age: 30
End: 2025-01-02
Payer: COMMERCIAL

## 2025-01-02 NOTE — TELEPHONE ENCOUNTER
Pt states that she had a healthy pregnancy in between miscarriages and would like to know if she has any reason to worry at this time. She would like to make sure that her hCG is going up at this time.

## 2025-01-02 NOTE — TELEPHONE ENCOUNTER
Patient has a history of miscarriage... she recently tested positive for pregnancy and was wondering if there was anything she could do to test for miscarriage at home or if she could get her HCG tested to ensure things are going according to planned.  I have scheduled her for her initial appointment but she was quite nervous and wondering if she could have a call back to be put as ease or advised about this pregnancy

## 2025-01-22 PROBLEM — Z34.80 PRENATAL CARE, SUBSEQUENT PREGNANCY: Status: ACTIVE | Noted: 2025-01-22

## 2025-01-23 ENCOUNTER — OFFICE VISIT (OUTPATIENT)
Dept: OBSTETRICS AND GYNECOLOGY | Facility: CLINIC | Age: 30
End: 2025-01-23
Payer: COMMERCIAL

## 2025-01-23 VITALS — RESPIRATION RATE: 14 BRPM | BODY MASS INDEX: 28.73 KG/M2 | WEIGHT: 178 LBS

## 2025-01-23 DIAGNOSIS — Z34.90 PREGNANCY WITH UNCERTAIN DATES, ANTEPARTUM: Primary | ICD-10-CM

## 2025-01-23 NOTE — PROGRESS NOTES
Subjective   Chief Complaint   Patient presents with    Follow-up       Geraldine Enrique is a 29 y.o. year old .  Patient's last menstrual period was 2023 (approximate).  She comes today to initiate prenatal care.  She has had no issues at all with pain or bleeding.  Ultrasound done in advance of this appointment shows an intrauterine gestational sac with yolk sac.  Mean sac diameter is about 1.8 cm.  Cardiac activity is not seen.  There is a small associated subchorionic hemorrhage.  Menstrual dating has her about 2 weeks farther along than this.    The following portions of the patient's history were reviewed and updated as appropriate:current medications and allergies    Social History    Tobacco Use      Smoking status: Never        Passive exposure: Never      Smokeless tobacco: Never         Objective   Resp 14   Wt 80.7 kg (178 lb)   LMP 2023 (Approximate)   BMI 28.73 kg/m²     Lab Review   No data reviewed    Imaging   See HPI       Assessment   Probable blighted ovum but at this mean sac diameter cannot conclude with certainty     Plan   Needs repeat ultrasound in 1 week before concluding definitively this is a nonviable intrauterine gestation  The importance of keeping all planned follow-up and taking all medications as prescribed was emphasized.             This note was electronically signed.    Louie Velazquez M.D.  2025        Part of this note may be an electronic transcription/translation of spoken language to printed text using the Dragon Dictation System.

## 2025-01-27 ENCOUNTER — TELEPHONE (OUTPATIENT)
Dept: OBSTETRICS AND GYNECOLOGY | Facility: CLINIC | Age: 30
End: 2025-01-27

## 2025-01-27 NOTE — TELEPHONE ENCOUNTER
PATIENT THINKS SHE IS MISCARRYING NOW.  SHE HAS STARTED BLEEDING.  DOES SHE NEED TO KEEP HER APPT'S FOR IN THE MORNING.

## 2025-01-27 NOTE — TELEPHONE ENCOUNTER
Patient has been advised on ED precautions that bleeding through one pad every thirty minutes for two hours or severe pain, she should be seen in the ED.     She will come to her scheduled appointment based on how she is doing overnight and if she feels that she needs the appointment in the morning. She will call with any questions or concerns regarding her appointment in the morning if she needs to.     She demonstrates understanding and appreciation.

## 2025-01-27 NOTE — TELEPHONE ENCOUNTER
Pt states that she is suppose to be seen for US and follow up tomorrow. She began bleeding today. She states that it was somewhat expected. She wanted to know if she should still be seen or cancel. She relates no pain. Bleeding is similar to light period or spotting, bright red blood. Has some dark pieces as well. She was suppose to be 9 weeks at this time but was measuring smaller.

## 2025-01-28 ENCOUNTER — TELEPHONE (OUTPATIENT)
Dept: OBSTETRICS AND GYNECOLOGY | Facility: CLINIC | Age: 30
End: 2025-01-28

## 2025-01-28 ENCOUNTER — OFFICE VISIT (OUTPATIENT)
Dept: OBSTETRICS AND GYNECOLOGY | Facility: CLINIC | Age: 30
End: 2025-01-28
Payer: COMMERCIAL

## 2025-01-28 VITALS — RESPIRATION RATE: 14 BRPM | BODY MASS INDEX: 28.73 KG/M2 | WEIGHT: 178 LBS

## 2025-01-28 DIAGNOSIS — O02.1 MISSED ABORTION: Primary | ICD-10-CM

## 2025-01-28 PROBLEM — Z34.80 PRENATAL CARE, SUBSEQUENT PREGNANCY: Status: RESOLVED | Noted: 2025-01-22 | Resolved: 2025-01-28

## 2025-01-28 NOTE — PROGRESS NOTES
Subjective   Chief Complaint   Patient presents with    Follow-up       Geraldine Enrique is a 29 y.o. year old .  Patient's last menstrual period was 2023 (approximate).  She comes today in follow-up.  She has had a little bit of spotting but nothing significant.  She has considered options and would like to proceed with suction D&C.    The following portions of the patient's history were reviewed and updated as appropriate:current medications and allergies    Social History    Tobacco Use      Smoking status: Never        Passive exposure: Never      Smokeless tobacco: Never         Objective   Resp 14   Wt 80.7 kg (178 lb)   LMP 2023 (Approximate)   Breastfeeding Unknown   BMI 28.73 kg/m²     Lab Review   No data reviewed    Imaging   Ultrasound today confirms blighted ovum       Assessment   Blighted ovum     Plan   Proceed with suction D&C  Small risks including retained products of conception and uterine perforation discussed  Reviewed needed decision making as a relates to disposition of fetal tissue.  She will consider this information and let us know tomorrow at the time of the procedure  The importance of keeping all planned follow-up and taking all medications as prescribed was emphasized.  Follow up PRN             This note was electronically signed.    Louie Velazquez M.D.  2025      Part of this note may be an electronic transcription/translation of spoken language to printed text using the Dragon Dictation System.

## 2025-01-29 NOTE — TELEPHONE ENCOUNTER
Assuming the bleeding continues to get lighter, next step would be repeating her hCG in about 2 weeks time.  I placed an order.  She can show up anytime at her convenience to get that done.

## 2025-01-29 NOTE — TELEPHONE ENCOUNTER
Pt reports that she passed the baby yesterday. She continues to have light bleeding and changing every few hours. It is not fully saturated when changing. Complains of burning sensation located in her urethra. Does not hurt when she is urinating, comes and goes at random. No change to color or odor of urine. No nausea, vomiting, chills or fever.

## 2025-01-29 NOTE — TELEPHONE ENCOUNTER
Right now not much to do.  Find out if she is continuing to bleed and that she thinks she has passed everything fully

## 2025-02-11 ENCOUNTER — LAB (OUTPATIENT)
Facility: HOSPITAL | Age: 30
End: 2025-02-11
Payer: COMMERCIAL

## 2025-02-11 DIAGNOSIS — O02.1 MISSED ABORTION: ICD-10-CM

## 2025-02-11 LAB — HCG INTACT+B SERPL-ACNC: 24.7 MIU/ML

## 2025-02-11 PROCEDURE — 84702 CHORIONIC GONADOTROPIN TEST: CPT

## 2025-02-11 PROCEDURE — 36415 COLL VENOUS BLD VENIPUNCTURE: CPT

## 2025-04-01 ENCOUNTER — OFFICE VISIT (OUTPATIENT)
Dept: OBSTETRICS AND GYNECOLOGY | Facility: CLINIC | Age: 30
End: 2025-04-01
Payer: COMMERCIAL

## 2025-04-01 VITALS
BODY MASS INDEX: 28.57 KG/M2 | WEIGHT: 177 LBS | DIASTOLIC BLOOD PRESSURE: 72 MMHG | SYSTOLIC BLOOD PRESSURE: 116 MMHG | RESPIRATION RATE: 14 BRPM

## 2025-04-01 DIAGNOSIS — Z31.69 ENCOUNTER FOR PRECONCEPTION CONSULTATION: ICD-10-CM

## 2025-04-01 DIAGNOSIS — Z01.419 WELL WOMAN EXAM WITH ROUTINE GYNECOLOGICAL EXAM: Primary | ICD-10-CM

## 2025-04-01 DIAGNOSIS — Z71.85 VACCINE COUNSELING: ICD-10-CM

## 2025-04-01 DIAGNOSIS — N81.4 UTERINE PROLAPSE: ICD-10-CM

## 2025-04-01 NOTE — PROGRESS NOTES
Subjective   Chief Complaint   Patient presents with    Gynecologic Exam     Geraldine Enrique is a 29 y.o. year old  presenting to be seen for her annual exam.  She has not been any more bothered by her uterine prolapse that she has historically over time.    SEXUAL Hx:  She is currently sexually active.  In the past year there there has been NO new sexual partners.    Condoms are used intermittently.  She would not like to be screened for STD's at today's exam.  Current birth control method: condoms.  She is happy with her current method of contraception and does not want to discuss alternative methods of contraception.  MENSTRUAL Hx:  Patient's last menstrual period was 2025 (approximate).  In the past 6 months her cycles have been regular, predictable and occur monthly.  Her menstrual flow is typically normal.   Each month on average there are roughly 0 day(s) of very heavy flow.  Intermenstrual bleeding is present - once just this past cycle .    Post-coital bleeding is absent.  Dysmenorrhea: is not affecting her activities of daily living  PMS: is not affecting her activities of daily living  Her cycles are not a source of concern for her that she wishes to discuss today.  HEALTH Hx:  She exercises regularly: no (but is planning to start exercising more).  She wears her seat belt: yes.  She has concerns about domestic violence: no.    The following portions of the patient's history were reviewed and updated as appropriate:problem list, current medications, allergies, past family history, past medical history, past social history, and past surgical history.    Social History    Tobacco Use      Smoking status: Never        Passive exposure: Never      Smokeless tobacco: Never    Review of Systems  Constitutional POS: nothing reported    NEG: anorexia or night sweats   Genitourinary POS: both stress and urge incontinence are present but it IS NOT effecting her ADL's    NEG: dysuria or  hematuria      Gastointestinal POS: nothing reported    NEG: bloating, change in bowel habits, melena, or reflux symptoms   Integument POS: nothing reported    NEG: moles that are changing in size, shape, color or rashes   Breast POS: nothing reported    NEG: persistent breast lump, skin dimpling, or nipple discharge        Objective   /72   Resp 14   Wt 80.3 kg (177 lb)   LMP 03/18/2025 (Approximate)   Breastfeeding No   BMI 28.57 kg/m²     General:  well developed; well nourished  no acute distress  mentation appropriate   Skin:  No suspicious lesions seen   Thyroid: normal to inspection and palpation   Breasts:  Examined in supine position  Symmetric without masses or skin dimpling  Nipples normal without inversion, lesions or discharge  There are no palpable axillary nodes   Abdomen: soft, non-tender; no masses  no umbilical or inguinal hernias are present  no hepato-splenomegaly   Pelvis: Clinical staff was present for exam  External genitalia:  normal appearance of the external genitalia including Bartholin's and [place]'s glands.  :  urethral meatus normal; urethra normal:  Vaginal:  normal pink mucosa without prolapse or lesions.  Cervix:  normal appearance.  Uterus:  normal size, shape and consistency.  Adnexa:  normal bimanual exam of the adnexa.  Rectal:  digital rectal exam not performed; anus visually normal appearing.  Uterine GRADE 2        Assessment   Normal GYN exam with pelvic organ prolapse - not significantly different from prior exams.  It is minimally symptomatic and not affecting her quality of life.  Intermenstrual bleeding 1 time.  Unless this problem persists no additional workup is needed  Sub-optimal BC - she currently is not taking sufficient folic acid  MICHELE - not significantly affecting her activities of daily living.         Plan   Pap was done today.  If she does not receive the results of the Pap within 2 weeks  time, she was instructed to call to find out the results.  I  explained to Geraldine that the recommendations for Pap smear interval in a low risk patient's has lengthened to 3 years time.  I encouraged her to be seen yearly for a full physical exam including breast and pelvic exam even during the off years when PAP's will not be performed.  Folic acid for the prevention of neural tube defects was discussed.  At least 0.4 mg should be taken preconceptionally to reduce the risk.  It was explained that this may reduce the baseline incidence of neural tube defect by as much as 65%.  Folic acid can be found in OTC multivitamins, OTC prenatal vitamins or breakfast cereal that that contains 100% of the RDA of folate.  I have counseled the patient on the importance of staying up to date with preventive vaccines as well as the risks and benefits of these vaccines.  Her vaccine record was reviewed and updated.  The importance of keeping all planned follow-up and taking all medications as prescribed was emphasized.  Follow up for annual exam 1 year    No orders of the defined types were placed in this encounter.         This note was electronically signed.    Louie Velazquez M.D.  April 1, 2025    Part of this note may be an electronic transcription/translation of spoken language to printed text using the Dragon Dictation System.

## 2025-04-07 LAB — REF LAB TEST METHOD: NORMAL

## 2025-04-10 NOTE — ANESTHESIA PREPROCEDURE EVALUATION
Anesthesia Evaluation     Patient summary reviewed and Nursing notes reviewed   no history of anesthetic complications:   NPO Solid Status: > 8 hours  NPO Liquid Status: > 2 hours           Airway   Mallampati: II  TM distance: >3 FB  Neck ROM: full  No difficulty expected  Dental - normal exam     Pulmonary - negative pulmonary ROS and normal exam    breath sounds clear to auscultation  Cardiovascular - negative cardio ROS and normal exam    ECG reviewed  Rhythm: regular  Rate: normal        Neuro/Psych- negative ROS  GI/Hepatic/Renal/Endo    (+) GERD, renal disease- stones    Musculoskeletal     Abdominal    Substance History      OB/GYN    (+) Pregnant (25 wks pregnant - FHT performed)        Other - negative ROS                     Anesthesia Plan    ASA 2     general   Rapid sequence  (Will check FHT post-operatively)  intravenous induction     Anesthetic plan, risks, benefits, and alternatives have been provided, discussed and informed consent has been obtained with: patient.    Plan discussed with CRNA.    CODE STATUS:    Code Status (Patient has no pulse and is not breathing): CPR (Attempt to Resuscitate)  Medical Interventions (Patient has pulse or is breathing): Full Support       None

## 2025-08-08 ENCOUNTER — LAB (OUTPATIENT)
Facility: HOSPITAL | Age: 30
End: 2025-08-08
Payer: COMMERCIAL

## 2025-08-08 DIAGNOSIS — Z3A.01 LESS THAN 8 WEEKS GESTATION OF PREGNANCY: Primary | ICD-10-CM

## 2025-08-08 DIAGNOSIS — Z3A.01 LESS THAN 8 WEEKS GESTATION OF PREGNANCY: ICD-10-CM

## 2025-08-08 LAB — HCG INTACT+B SERPL-ACNC: 368 MIU/ML

## 2025-08-08 PROCEDURE — 84702 CHORIONIC GONADOTROPIN TEST: CPT

## 2025-08-10 ENCOUNTER — LAB (OUTPATIENT)
Dept: LAB | Facility: HOSPITAL | Age: 30
End: 2025-08-10
Payer: COMMERCIAL

## 2025-08-10 DIAGNOSIS — Z3A.01 LESS THAN 8 WEEKS GESTATION OF PREGNANCY: ICD-10-CM

## 2025-08-10 LAB — HCG INTACT+B SERPL-ACNC: 715 MIU/ML

## 2025-08-10 PROCEDURE — 84702 CHORIONIC GONADOTROPIN TEST: CPT

## 2025-08-10 PROCEDURE — 36415 COLL VENOUS BLD VENIPUNCTURE: CPT

## 2025-08-22 ENCOUNTER — TELEPHONE (OUTPATIENT)
Dept: OBSTETRICS AND GYNECOLOGY | Facility: CLINIC | Age: 30
End: 2025-08-22
Payer: COMMERCIAL

## 2025-08-25 ENCOUNTER — OFFICE VISIT (OUTPATIENT)
Dept: OBSTETRICS AND GYNECOLOGY | Facility: CLINIC | Age: 30
End: 2025-08-25
Payer: COMMERCIAL

## 2025-08-25 ENCOUNTER — LAB (OUTPATIENT)
Dept: LAB | Facility: HOSPITAL | Age: 30
End: 2025-08-25
Payer: COMMERCIAL

## 2025-08-25 VITALS — RESPIRATION RATE: 14 BRPM | WEIGHT: 181 LBS | BODY MASS INDEX: 29.21 KG/M2

## 2025-08-25 DIAGNOSIS — O09.291 CURRENT PREGNANCY IN FIRST TRIMESTER WITH HISTORY OF SPONTANEOUS ABORTION DURING PRIOR PREGNANCY: Primary | ICD-10-CM

## 2025-08-25 DIAGNOSIS — O09.291 CURRENT PREGNANCY IN FIRST TRIMESTER WITH HISTORY OF SPONTANEOUS ABORTION DURING PRIOR PREGNANCY: ICD-10-CM

## 2025-08-25 LAB
HCG INTACT+B SERPL-ACNC: NORMAL MIU/ML
PROGEST SERPL-MCNC: 20.1 NG/ML

## 2025-08-25 PROCEDURE — 36415 COLL VENOUS BLD VENIPUNCTURE: CPT

## 2025-08-25 PROCEDURE — 99213 OFFICE O/P EST LOW 20 MIN: CPT | Performed by: OBSTETRICS & GYNECOLOGY

## 2025-08-25 PROCEDURE — 84702 CHORIONIC GONADOTROPIN TEST: CPT

## 2025-08-25 PROCEDURE — 84144 ASSAY OF PROGESTERONE: CPT

## 2025-08-25 RX ORDER — PRENATAL VIT/IRON FUM/FOLIC AC 27MG-0.8MG
TABLET ORAL DAILY
COMMUNITY

## 2025-08-25 RX ORDER — PROGESTERONE 100 MG/1
100 CAPSULE ORAL DAILY
Qty: 30 CAPSULE | Refills: 1 | Status: SHIPPED | OUTPATIENT
Start: 2025-08-25 | End: 2025-08-26

## (undated) DEVICE — PK CYSTO-TUR BASIC 10

## (undated) DEVICE — GW ZIPWIRE STD/SHFT STR JB/8CM .035X150CM

## (undated) DEVICE — FIBR LASR SOLTIVE BALL/TP 200MICRON 1P/U

## (undated) DEVICE — NITINOL WIRE WITH HYDROPHILIC TIP: Brand: SENSOR

## (undated) DEVICE — GLV SURG BIOGEL LTX PF 7